# Patient Record
Sex: FEMALE | Race: BLACK OR AFRICAN AMERICAN | Employment: FULL TIME | URBAN - METROPOLITAN AREA
[De-identification: names, ages, dates, MRNs, and addresses within clinical notes are randomized per-mention and may not be internally consistent; named-entity substitution may affect disease eponyms.]

---

## 2020-12-23 ENCOUNTER — APPOINTMENT (OUTPATIENT)
Dept: GENERAL RADIOLOGY | Age: 35
DRG: 674 | End: 2020-12-23
Attending: EMERGENCY MEDICINE
Payer: COMMERCIAL

## 2020-12-23 ENCOUNTER — HOSPITAL ENCOUNTER (INPATIENT)
Age: 35
LOS: 18 days | Discharge: HOME | DRG: 674 | End: 2021-01-11
Attending: EMERGENCY MEDICINE | Admitting: INTERNAL MEDICINE
Payer: COMMERCIAL

## 2020-12-23 DIAGNOSIS — Z20.822 SUSPECTED COVID-19 VIRUS INFECTION: ICD-10-CM

## 2020-12-23 DIAGNOSIS — N12 PYELONEPHRITIS: ICD-10-CM

## 2020-12-23 DIAGNOSIS — N17.9 ACUTE RENAL FAILURE, UNSPECIFIED ACUTE RENAL FAILURE TYPE (HCC): Primary | ICD-10-CM

## 2020-12-23 PROCEDURE — 99285 EMERGENCY DEPT VISIT HI MDM: CPT

## 2020-12-23 PROCEDURE — 96365 THER/PROPH/DIAG IV INF INIT: CPT

## 2020-12-23 PROCEDURE — 71045 X-RAY EXAM CHEST 1 VIEW: CPT

## 2020-12-23 PROCEDURE — 96374 THER/PROPH/DIAG INJ IV PUSH: CPT

## 2020-12-23 PROCEDURE — 96375 TX/PRO/DX INJ NEW DRUG ADDON: CPT

## 2020-12-23 PROCEDURE — 51702 INSERT TEMP BLADDER CATH: CPT

## 2020-12-23 NOTE — Clinical Note
Status[de-identified] INPATIENT [101]   Type of Bed: Medical [8]   Inpatient Hospitalization Certified Necessary for the Following Reasons: 3.  Patient receiving treatment that can only be provided in an inpatient setting (further clarification in H&P documentation)   Admitting Diagnosis: Acute renal failure (ARF) [facility]) [[id number]]   Admitting Physician: Kinga Landeros [[id number]]   Attending Physician: Kinga Landeros [[id number]]   Estimated Length of Stay: 2 Midnights   Discharge Plan[de-identified] Home with Office Follow-up

## 2020-12-24 ENCOUNTER — APPOINTMENT (OUTPATIENT)
Dept: INTERVENTIONAL RADIOLOGY/VASCULAR | Age: 35
DRG: 674 | End: 2020-12-24
Attending: INTERNAL MEDICINE
Payer: COMMERCIAL

## 2020-12-24 ENCOUNTER — APPOINTMENT (OUTPATIENT)
Dept: ULTRASOUND IMAGING | Age: 35
DRG: 674 | End: 2020-12-24
Attending: INTERNAL MEDICINE
Payer: COMMERCIAL

## 2020-12-24 PROBLEM — Z86.79 HX OF ESSENTIAL HYPERTENSION: Status: ACTIVE | Noted: 2020-12-24

## 2020-12-24 PROBLEM — R79.89 ELEVATED PROCALCITONIN: Status: ACTIVE | Noted: 2020-12-24

## 2020-12-24 PROBLEM — D50.9 MICROCYTIC ANEMIA: Status: ACTIVE | Noted: 2020-12-24

## 2020-12-24 PROBLEM — N17.9 ACUTE RENAL FAILURE (ARF) (HCC): Status: ACTIVE | Noted: 2020-12-24

## 2020-12-24 PROBLEM — E66.9 OBESITY: Status: ACTIVE | Noted: 2020-12-24

## 2020-12-24 LAB
ALBUMIN SERPL-MCNC: 1.8 G/DL (ref 3.5–5)
ALBUMIN/GLOB SERPL: 0.4 {RATIO} (ref 1.2–3.5)
ALP SERPL-CCNC: 107 U/L (ref 50–136)
ALT SERPL-CCNC: 35 U/L (ref 12–65)
ANION GAP SERPL CALC-SCNC: 18 MMOL/L (ref 7–16)
APPEARANCE UR: ABNORMAL
AST SERPL-CCNC: 77 U/L (ref 15–37)
BACTERIA URNS QL MICRO: ABNORMAL /HPF
BASOPHILS # BLD: 0 K/UL (ref 0–0.2)
BASOPHILS NFR BLD: 0 % (ref 0–2)
BILIRUB SERPL-MCNC: 0.3 MG/DL (ref 0.2–1.1)
BILIRUB UR QL: ABNORMAL
BNP SERPL-MCNC: ABNORMAL PG/ML (ref 5–125)
BUN SERPL-MCNC: 132 MG/DL (ref 6–23)
CALCIUM SERPL-MCNC: 8.1 MG/DL (ref 8.3–10.4)
CHLORIDE SERPL-SCNC: 94 MMOL/L (ref 98–107)
CK MB CFR SERPL CALC: 0.8 %
CK MB SERPL-MCNC: 4.3 NG/ML (ref 0.5–3.6)
CK SERPL-CCNC: 512 U/L (ref 21–215)
CO2 SERPL-SCNC: 18 MMOL/L (ref 21–32)
COLOR UR: ABNORMAL
COVID-19 RAPID TEST, COVR: NOT DETECTED
CREAT SERPL-MCNC: 15.7 MG/DL (ref 0.6–1)
DIFFERENTIAL METHOD BLD: ABNORMAL
EOSINOPHIL # BLD: 0 K/UL (ref 0–0.8)
EOSINOPHIL NFR BLD: 0 % (ref 0.5–7.8)
EPI CELLS #/AREA URNS HPF: ABNORMAL /HPF
ERYTHROCYTE [DISTWIDTH] IN BLOOD BY AUTOMATED COUNT: 14 % (ref 11.9–14.6)
ERYTHROCYTE [SEDIMENTATION RATE] IN BLOOD: 116 MM/HR (ref 0–20)
FERRITIN SERPL-MCNC: 750 NG/ML (ref 8–388)
GLOBULIN SER CALC-MCNC: 5 G/DL (ref 2.3–3.5)
GLUCOSE SERPL-MCNC: 189 MG/DL (ref 65–100)
GLUCOSE UR STRIP.AUTO-MCNC: 100 MG/DL
HAV IGM SER QL: NONREACTIVE
HBV CORE IGM SER QL: NONREACTIVE
HBV SURFACE AG SER QL: NONREACTIVE
HCG SERPL QL: NEGATIVE
HCT VFR BLD AUTO: 32.1 % (ref 35.8–46.3)
HCV AB SER QL: NONREACTIVE
HGB BLD-MCNC: 10.7 G/DL (ref 11.7–15.4)
HGB UR QL STRIP: ABNORMAL
HIV 1+2 AB+HIV1 P24 AG SERPL QL IA: NONREACTIVE
HIV12 RESULT COMMENT, HHIVC: ABNORMAL
IMM GRANULOCYTES # BLD AUTO: 0.2 K/UL (ref 0–0.5)
IMM GRANULOCYTES NFR BLD AUTO: 2 % (ref 0–5)
IRON SATN MFR SERPL: 35 %
IRON SERPL-MCNC: 41 UG/DL (ref 35–150)
KETONES UR QL STRIP.AUTO: NEGATIVE MG/DL
LACTATE SERPL-SCNC: 1.1 MMOL/L (ref 0.4–2)
LEUKOCYTE ESTERASE UR QL STRIP.AUTO: NEGATIVE
LYMPHOCYTES # BLD: 1.3 K/UL (ref 0.5–4.6)
LYMPHOCYTES NFR BLD: 13 % (ref 13–44)
MAGNESIUM SERPL-MCNC: 2.2 MG/DL (ref 1.8–2.4)
MCH RBC QN AUTO: 25.4 PG (ref 26.1–32.9)
MCHC RBC AUTO-ENTMCNC: 33.3 G/DL (ref 31.4–35)
MCV RBC AUTO: 76.1 FL (ref 79.6–97.8)
MONOCYTES # BLD: 0.9 K/UL (ref 0.1–1.3)
MONOCYTES NFR BLD: 10 % (ref 4–12)
NEUTS SEG # BLD: 7.1 K/UL (ref 1.7–8.2)
NEUTS SEG NFR BLD: 75 % (ref 43–78)
NITRITE UR QL STRIP.AUTO: NEGATIVE
NRBC # BLD: 0 K/UL (ref 0–0.2)
PH UR STRIP: 5.5 [PH] (ref 5–9)
PHOSPHATE SERPL-MCNC: 8.2 MG/DL (ref 2.5–4.5)
PLATELET # BLD AUTO: 291 K/UL (ref 150–450)
PMV BLD AUTO: 10.6 FL (ref 9.4–12.3)
POTASSIUM SERPL-SCNC: 4.1 MMOL/L (ref 3.5–5.1)
PROCALCITONIN SERPL-MCNC: 5.88 NG/ML
PROT SERPL-MCNC: 6.8 G/DL (ref 6.3–8.2)
PROT UR STRIP-MCNC: >300 MG/DL
RBC # BLD AUTO: 4.22 M/UL (ref 4.05–5.2)
RBC #/AREA URNS HPF: >100 /HPF
SARS COV-2, XPGCVT: NEGATIVE
SODIUM SERPL-SCNC: 130 MMOL/L (ref 136–145)
SOURCE, COVRS: NORMAL
SP GR UR REFRACTOMETRY: 1.02 (ref 1–1.02)
TIBC SERPL-MCNC: 116 UG/DL (ref 250–450)
TSH SERPL DL<=0.005 MIU/L-ACNC: 0.84 UIU/ML (ref 0.36–3.74)
UROBILINOGEN UR QL STRIP.AUTO: 0.2 EU/DL (ref 0.2–1)
WBC # BLD AUTO: 9.5 K/UL (ref 4.3–11.1)
WBC URNS QL MICRO: ABNORMAL /HPF

## 2020-12-24 PROCEDURE — 85652 RBC SED RATE AUTOMATED: CPT

## 2020-12-24 PROCEDURE — 83520 IMMUNOASSAY QUANT NOS NONAB: CPT

## 2020-12-24 PROCEDURE — 83735 ASSAY OF MAGNESIUM: CPT

## 2020-12-24 PROCEDURE — 84145 PROCALCITONIN (PCT): CPT

## 2020-12-24 PROCEDURE — 77001 FLUOROGUIDE FOR VEIN DEVICE: CPT

## 2020-12-24 PROCEDURE — 77030002916 HC SUT ETHLN J&J -A

## 2020-12-24 PROCEDURE — 74011250636 HC RX REV CODE- 250/636: Performed by: STUDENT IN AN ORGANIZED HEALTH CARE EDUCATION/TRAINING PROGRAM

## 2020-12-24 PROCEDURE — 84100 ASSAY OF PHOSPHORUS: CPT

## 2020-12-24 PROCEDURE — C1894 INTRO/SHEATH, NON-LASER: HCPCS

## 2020-12-24 PROCEDURE — 87635 SARS-COV-2 COVID-19 AMP PRB: CPT

## 2020-12-24 PROCEDURE — 5A1D70Z PERFORMANCE OF URINARY FILTRATION, INTERMITTENT, LESS THAN 6 HOURS PER DAY: ICD-10-PCS | Performed by: NURSE PRACTITIONER

## 2020-12-24 PROCEDURE — 83880 ASSAY OF NATRIURETIC PEPTIDE: CPT

## 2020-12-24 PROCEDURE — 80053 COMPREHEN METABOLIC PANEL: CPT

## 2020-12-24 PROCEDURE — 77030018719 HC DRSG PTCH ANTIMIC J&J -A

## 2020-12-24 PROCEDURE — 84703 CHORIONIC GONADOTROPIN ASSAY: CPT

## 2020-12-24 PROCEDURE — 86160 COMPLEMENT ANTIGEN: CPT

## 2020-12-24 PROCEDURE — 85025 COMPLETE CBC W/AUTO DIFF WBC: CPT

## 2020-12-24 PROCEDURE — 86225 DNA ANTIBODY NATIVE: CPT

## 2020-12-24 PROCEDURE — 02H633Z INSERTION OF INFUSION DEVICE INTO RIGHT ATRIUM, PERCUTANEOUS APPROACH: ICD-10-PCS | Performed by: RADIOLOGY

## 2020-12-24 PROCEDURE — 80074 ACUTE HEPATITIS PANEL: CPT

## 2020-12-24 PROCEDURE — 90935 HEMODIALYSIS ONE EVALUATION: CPT

## 2020-12-24 PROCEDURE — 83605 ASSAY OF LACTIC ACID: CPT

## 2020-12-24 PROCEDURE — 82784 ASSAY IGA/IGD/IGG/IGM EACH: CPT

## 2020-12-24 PROCEDURE — 74011250637 HC RX REV CODE- 250/637: Performed by: STUDENT IN AN ORGANIZED HEALTH CARE EDUCATION/TRAINING PROGRAM

## 2020-12-24 PROCEDURE — 74011250636 HC RX REV CODE- 250/636: Performed by: EMERGENCY MEDICINE

## 2020-12-24 PROCEDURE — 82553 CREATINE MB FRACTION: CPT

## 2020-12-24 PROCEDURE — 77010033678 HC OXYGEN DAILY

## 2020-12-24 PROCEDURE — 74011000258 HC RX REV CODE- 258: Performed by: EMERGENCY MEDICINE

## 2020-12-24 PROCEDURE — 81003 URINALYSIS AUTO W/O SCOPE: CPT

## 2020-12-24 PROCEDURE — C1752 CATH,HEMODIALYSIS,SHORT-TERM: HCPCS

## 2020-12-24 PROCEDURE — 82728 ASSAY OF FERRITIN: CPT

## 2020-12-24 PROCEDURE — 84443 ASSAY THYROID STIM HORMONE: CPT

## 2020-12-24 PROCEDURE — 94760 N-INVAS EAR/PLS OXIMETRY 1: CPT

## 2020-12-24 PROCEDURE — 74011000250 HC RX REV CODE- 250: Performed by: RADIOLOGY

## 2020-12-24 PROCEDURE — 84166 PROTEIN E-PHORESIS/URINE/CSF: CPT

## 2020-12-24 PROCEDURE — 2709999900 HC NON-CHARGEABLE SUPPLY

## 2020-12-24 PROCEDURE — 76770 US EXAM ABDO BACK WALL COMP: CPT

## 2020-12-24 PROCEDURE — 74011250636 HC RX REV CODE- 250/636: Performed by: RADIOLOGY

## 2020-12-24 PROCEDURE — 87389 HIV-1 AG W/HIV-1&-2 AB AG IA: CPT

## 2020-12-24 PROCEDURE — 74011250637 HC RX REV CODE- 250/637: Performed by: INTERNAL MEDICINE

## 2020-12-24 PROCEDURE — 83550 IRON BINDING TEST: CPT

## 2020-12-24 PROCEDURE — 65660000000 HC RM CCU STEPDOWN

## 2020-12-24 PROCEDURE — 74011000250 HC RX REV CODE- 250: Performed by: INTERNAL MEDICINE

## 2020-12-24 PROCEDURE — 74011250636 HC RX REV CODE- 250/636: Performed by: INTERNAL MEDICINE

## 2020-12-24 PROCEDURE — 87086 URINE CULTURE/COLONY COUNT: CPT

## 2020-12-24 PROCEDURE — 86334 IMMUNOFIX E-PHORESIS SERUM: CPT

## 2020-12-24 PROCEDURE — 74011000250 HC RX REV CODE- 250: Performed by: STUDENT IN AN ORGANIZED HEALTH CARE EDUCATION/TRAINING PROGRAM

## 2020-12-24 RX ORDER — SODIUM CHLORIDE 0.9 % (FLUSH) 0.9 %
5-40 SYRINGE (ML) INJECTION EVERY 8 HOURS
Status: DISCONTINUED | OUTPATIENT
Start: 2020-12-24 | End: 2021-01-11 | Stop reason: HOSPADM

## 2020-12-24 RX ORDER — ONDANSETRON 2 MG/ML
4 INJECTION INTRAMUSCULAR; INTRAVENOUS
Status: DISCONTINUED | OUTPATIENT
Start: 2020-12-24 | End: 2020-12-27

## 2020-12-24 RX ORDER — LIDOCAINE HYDROCHLORIDE 20 MG/ML
2-10 INJECTION, SOLUTION INFILTRATION; PERINEURAL ONCE
Status: COMPLETED | OUTPATIENT
Start: 2020-12-24 | End: 2020-12-24

## 2020-12-24 RX ORDER — PROMETHAZINE HYDROCHLORIDE 25 MG/1
12.5 TABLET ORAL
Status: DISCONTINUED | OUTPATIENT
Start: 2020-12-24 | End: 2021-01-11 | Stop reason: HOSPADM

## 2020-12-24 RX ORDER — POLYETHYLENE GLYCOL 3350 17 G/17G
17 POWDER, FOR SOLUTION ORAL DAILY PRN
Status: DISCONTINUED | OUTPATIENT
Start: 2020-12-24 | End: 2021-01-11 | Stop reason: HOSPADM

## 2020-12-24 RX ORDER — ACETAMINOPHEN 650 MG/1
650 SUPPOSITORY RECTAL
Status: DISCONTINUED | OUTPATIENT
Start: 2020-12-24 | End: 2021-01-11 | Stop reason: HOSPADM

## 2020-12-24 RX ORDER — METOPROLOL SUCCINATE 100 MG/1
100 TABLET, EXTENDED RELEASE ORAL DAILY
Status: DISCONTINUED | OUTPATIENT
Start: 2020-12-24 | End: 2020-12-26

## 2020-12-24 RX ORDER — HEPARIN SODIUM 1000 [USP'U]/ML
5000 INJECTION, SOLUTION INTRAVENOUS; SUBCUTANEOUS ONCE
Status: COMPLETED | OUTPATIENT
Start: 2020-12-24 | End: 2020-12-24

## 2020-12-24 RX ORDER — METOPROLOL SUCCINATE 50 MG/1
25 TABLET, EXTENDED RELEASE ORAL DAILY
Status: DISCONTINUED | OUTPATIENT
Start: 2020-12-24 | End: 2020-12-24

## 2020-12-24 RX ORDER — SODIUM CHLORIDE 0.9 % (FLUSH) 0.9 %
5-40 SYRINGE (ML) INJECTION AS NEEDED
Status: DISCONTINUED | OUTPATIENT
Start: 2020-12-24 | End: 2021-01-11 | Stop reason: HOSPADM

## 2020-12-24 RX ORDER — HYDRALAZINE HYDROCHLORIDE 50 MG/1
25 TABLET, FILM COATED ORAL
Status: DISCONTINUED | OUTPATIENT
Start: 2020-12-24 | End: 2021-01-04

## 2020-12-24 RX ORDER — ACETAMINOPHEN 325 MG/1
650 TABLET ORAL
Status: DISCONTINUED | OUTPATIENT
Start: 2020-12-24 | End: 2021-01-11 | Stop reason: HOSPADM

## 2020-12-24 RX ORDER — HYDRALAZINE HYDROCHLORIDE 25 MG/1
25 TABLET, FILM COATED ORAL 3 TIMES DAILY
Status: DISCONTINUED | OUTPATIENT
Start: 2020-12-24 | End: 2020-12-24

## 2020-12-24 RX ADMIN — HYDRALAZINE HYDROCHLORIDE 25 MG: 50 TABLET, FILM COATED ORAL at 06:40

## 2020-12-24 RX ADMIN — NICARDIPINE HYDROCHLORIDE 15 MG/HR: 2.5 INJECTION, SOLUTION INTRAVENOUS at 09:45

## 2020-12-24 RX ADMIN — LIDOCAINE HYDROCHLORIDE 200 MG: 20 INJECTION, SOLUTION INFILTRATION; PERINEURAL at 10:59

## 2020-12-24 RX ADMIN — SODIUM CHLORIDE 1000 ML: 900 INJECTION, SOLUTION INTRAVENOUS at 00:34

## 2020-12-24 RX ADMIN — CEFTRIAXONE SODIUM 1 G: 1 INJECTION, POWDER, FOR SOLUTION INTRAMUSCULAR; INTRAVENOUS at 03:51

## 2020-12-24 RX ADMIN — NICARDIPINE HYDROCHLORIDE 15 MG/HR: 2.5 INJECTION, SOLUTION INTRAVENOUS at 16:08

## 2020-12-24 RX ADMIN — METOPROLOL SUCCINATE 100 MG: 100 TABLET, EXTENDED RELEASE ORAL at 16:47

## 2020-12-24 RX ADMIN — NICARDIPINE HYDROCHLORIDE 15 MG/HR: 2.5 INJECTION, SOLUTION INTRAVENOUS at 13:18

## 2020-12-24 RX ADMIN — Medication 10 ML: at 22:00

## 2020-12-24 RX ADMIN — SODIUM BICARBONATE: 84 INJECTION, SOLUTION INTRAVENOUS at 19:47

## 2020-12-24 RX ADMIN — NICARDIPINE HYDROCHLORIDE 10 MG/HR: 2.5 INJECTION, SOLUTION INTRAVENOUS at 07:36

## 2020-12-24 RX ADMIN — Medication 30 ML: at 14:00

## 2020-12-24 RX ADMIN — HEPARIN SODIUM 3200 UNITS: 1000 INJECTION INTRAVENOUS; SUBCUTANEOUS at 10:59

## 2020-12-24 RX ADMIN — METOPROLOL SUCCINATE 25 MG: 50 TABLET, EXTENDED RELEASE ORAL at 05:44

## 2020-12-24 RX ADMIN — NICARDIPINE HYDROCHLORIDE 10 MG/HR: 2.5 INJECTION, SOLUTION INTRAVENOUS at 17:55

## 2020-12-24 RX ADMIN — SODIUM BICARBONATE: 84 INJECTION, SOLUTION INTRAVENOUS at 05:17

## 2020-12-24 NOTE — DIALYSIS
Hemodialysis treatment completed. Clotted with 15 min left     Patient alert and VS stable  /76  P 106       0.9 Kgs removed. Flushed both ports with 10 mL of NS.  CVC dressing clean, dry, and intact, tego caps intact,    Patient remains in 528.

## 2020-12-24 NOTE — ED PROVIDER NOTES
Presents to the ER with complaints of generalized weakness, fatigue and abdominal pain. States symptoms started over 2 weeks ago. States initially started with fever, malaise and body aches. Reports is since progressed to include nausea and vomiting. Apparently was seen at urgent care today. Had labs drawn that showed a elevated creatinine at 15. She did have a CT scan performed that it showed possible bilateral lung infiltrates as well as possible pyelonephritis. They did want to direct admit patient but was unable to do secondary to bed status so she was directed to the ER. The history is provided by the patient. Abdominal Pain   This is a recurrent problem. The current episode started more than 2 days ago. The problem has not changed since onset. The pain is located in the generalized abdominal region. The quality of the pain is aching and cramping. The pain is at a severity of 5/10. The pain is moderate. Associated symptoms include nausea, vomiting and back pain. Pertinent negatives include no hematochezia, no melena, no frequency and no hematuria. Past workup includes CT scan. Her past medical history is significant for UTI. No past medical history on file. No past surgical history on file. No family history on file.     Social History     Socioeconomic History    Marital status: SINGLE     Spouse name: Not on file    Number of children: Not on file    Years of education: Not on file    Highest education level: Not on file   Occupational History    Not on file   Social Needs    Financial resource strain: Not on file    Food insecurity     Worry: Not on file     Inability: Not on file    Transportation needs     Medical: Not on file     Non-medical: Not on file   Tobacco Use    Smoking status: Not on file   Substance and Sexual Activity    Alcohol use: Not on file    Drug use: Not on file    Sexual activity: Not on file   Lifestyle    Physical activity     Days per week: Not on file     Minutes per session: Not on file    Stress: Not on file   Relationships    Social connections     Talks on phone: Not on file     Gets together: Not on file     Attends Gnosticist service: Not on file     Active member of club or organization: Not on file     Attends meetings of clubs or organizations: Not on file     Relationship status: Not on file    Intimate partner violence     Fear of current or ex partner: Not on file     Emotionally abused: Not on file     Physically abused: Not on file     Forced sexual activity: Not on file   Other Topics Concern    Not on file   Social History Narrative    Not on file         ALLERGIES: Patient has no known allergies. Review of Systems   Constitutional: Positive for chills and fatigue. HENT: Negative for congestion and dental problem. Respiratory: Negative for chest tightness, shortness of breath and stridor. Cardiovascular: Negative for palpitations and leg swelling. Gastrointestinal: Positive for abdominal pain, nausea and vomiting. Negative for hematochezia and melena. Genitourinary: Negative for flank pain, frequency, hematuria and urgency. Musculoskeletal: Positive for back pain. Negative for joint swelling. Neurological: Negative for light-headedness and numbness. Hematological: Negative for adenopathy. Does not bruise/bleed easily. Psychiatric/Behavioral: Negative for behavioral problems and decreased concentration. Vitals:    12/23/20 2033   BP: (!) 173/97   Pulse: (!) 119   Resp: 18   Temp: 98 °F (36.7 °C)   SpO2: 98%   Weight: 124.7 kg (275 lb)   Height: 5' 5\" (1.651 m)            Physical Exam  Vitals signs and nursing note reviewed. Constitutional:       Appearance: She is well-developed. HENT:      Head: Normocephalic and atraumatic. Nose: Nose normal.      Mouth/Throat:      Mouth: Mucous membranes are moist.      Pharynx: No oropharyngeal exudate or posterior oropharyngeal erythema.    Eyes: Extraocular Movements: Extraocular movements intact. Conjunctiva/sclera: Conjunctivae normal.      Pupils: Pupils are equal, round, and reactive to light. Neck:      Musculoskeletal: Normal range of motion and neck supple. No neck rigidity or muscular tenderness. Cardiovascular:      Rate and Rhythm: Regular rhythm. Tachycardia present. Pulses: Normal pulses. Heart sounds: Normal heart sounds. No murmur. Pulmonary:      Effort: Pulmonary effort is normal.      Breath sounds: Normal breath sounds. No stridor. No wheezing. Abdominal:      General: Abdomen is flat and scaphoid. There is no distension. Palpations: There is no mass. Musculoskeletal: Normal range of motion. General: No swelling, tenderness, deformity or signs of injury. Skin:     General: Skin is warm and dry. Coloration: Skin is not jaundiced or pale. Neurological:      General: No focal deficit present. Mental Status: She is alert and oriented to person, place, and time. Cranial Nerves: No cranial nerve deficit. Sensory: No sensory deficit. Motor: No weakness. Psychiatric:         Mood and Affect: Mood normal.          MDM  Number of Diagnoses or Management Options  Acute renal failure, unspecified acute renal failure type ([address]  Pyelonephritis  Suspected COVID-19 virus infection  Diagnosis management comments: We will try to review records from outside facility. Initiate IV fluids. 4:06 AM  Creatinine is elevated here 15. Analysis shows 1+ bacteria. Chest x-ray shows no acute abnormalities. Patient will be admitted to hospitalist service. Likely needs Covid rule out. Graff catheter has been placed for strict I's and O's.        Amount and/or Complexity of Data Reviewed  Clinical lab tests: ordered and reviewed  Tests in the radiology section of CPT®: ordered and reviewed  Review and summarize past medical records: yes  Discuss the patient with other providers: yes  Independent visualization of images, tracings, or specimens: yes    Risk of Complications, Morbidity, and/or Mortality  Presenting problems: high  Diagnostic procedures: moderate  Management options: high           Procedures                 Results Include:    Recent Results (from the past 24 hour(s))   CBC WITH AUTOMATED DIFF    Collection Time: 12/24/20 12:16 AM   Result Value Ref Range    WBC 9.5 4.3 - 11.1 K/uL    RBC 4.22 4.05 - 5.2 M/uL    HGB 10.7 (L) 11.7 - 15.4 g/dL    HCT 32.1 (L) 35.8 - 46.3 %    MCV 76.1 (L) 79.6 - 97.8 FL    MCH 25.4 (L) 26.1 - 32.9 PG    MCHC 33.3 31.4 - 35.0 g/dL    RDW 14.0 11.9 - 14.6 %    PLATELET 748 612 - 451 K/uL    MPV 10.6 9.4 - 12.3 FL    ABSOLUTE NRBC 0.00 0.0 - 0.2 K/uL    DF AUTOMATED      NEUTROPHILS 75 43 - 78 %    LYMPHOCYTES 13 13 - 44 %    MONOCYTES 10 4.0 - 12.0 %    EOSINOPHILS 0 (L) 0.5 - 7.8 %    BASOPHILS 0 0.0 - 2.0 %    IMMATURE GRANULOCYTES 2 0.0 - 5.0 %    ABS. NEUTROPHILS 7.1 1.7 - 8.2 K/UL    ABS. LYMPHOCYTES 1.3 0.5 - 4.6 K/UL    ABS. MONOCYTES 0.9 0.1 - 1.3 K/UL    ABS. EOSINOPHILS 0.0 0.0 - 0.8 K/UL    ABS. BASOPHILS 0.0 0.0 - 0.2 K/UL    ABS. IMM. GRANS. 0.2 0.0 - 0.5 K/UL   METABOLIC PANEL, COMPREHENSIVE    Collection Time: 12/24/20 12:16 AM   Result Value Ref Range    Sodium 130 (L) 136 - 145 mmol/L    Potassium 4.1 3.5 - 5.1 mmol/L    Chloride 94 (L) 98 - 107 mmol/L    CO2 18 (L) 21 - 32 mmol/L    Anion gap 18 (H) 7 - 16 mmol/L    Glucose 189 (H) 65 - 100 mg/dL     (H) 6 - 23 MG/DL    Creatinine 15.70 (H) 0.6 - 1.0 MG/DL    GFR est AA 3 (L) >60 ml/min/1.73m2    GFR est non-AA 3 (L) >60 ml/min/1.73m2    Calcium 8.1 (L) 8.3 - 10.4 MG/DL    Bilirubin, total 0.3 0.2 - 1.1 MG/DL    ALT (SGPT) 35 12 - 65 U/L    AST (SGOT) 77 (H) 15 - 37 U/L    Alk.  phosphatase 107 50 - 136 U/L    Protein, total 6.8 6.3 - 8.2 g/dL    Albumin 1.8 (L) 3.5 - 5.0 g/dL    Globulin 5.0 (H) 2.3 - 3.5 g/dL    A-G Ratio 0.4 (L) 1.2 - 3.5     MAGNESIUM    Collection Time: 12/24/20 12:16 AM   Result Value Ref Range    Magnesium 2.2 1.8 - 2.4 mg/dL   URINALYSIS W/ RFLX MICROSCOPIC    Collection Time: 12/24/20 12:16 AM   Result Value Ref Range    Color ORANGE      Appearance HAZY      Specific gravity 1.020 1.001 - 1.023      pH (UA) 5.5 5.0 - 9.0      Protein >300 (A) NEG mg/dL    Glucose 100 (A) NEG mg/dL    Ketone Negative NEG mg/dL    Bilirubin SMALL (A) NEG      Blood LARGE (A) NEG      Urobilinogen 0.2 0.2 - 1.0 EU/dL    Nitrites Negative NEG      Leukocyte Esterase Negative NEG      WBC 5-10 0 /hpf    RBC >100 0 /hpf    Epithelial cells 0-3 0 /hpf    Bacteria 1+ (H) 0 /hpf   LACTIC ACID    Collection Time: 12/24/20 12:16 AM   Result Value Ref Range    Lactic acid 1.1 0.4 - 2.0 MMOL/L   PROCALCITONIN    Collection Time: 12/24/20 12:16 AM   Result Value Ref Range    Procalcitonin 5.88 ng/mL   NT-PRO BNP    Collection Time: 12/24/20 12:16 AM   Result Value Ref Range    NT pro-BNP 16,587 (H) 5 - 125 PG/ML   CK WITH MB    Collection Time: 12/24/20 12:20 AM   Result Value Ref Range    CK - MB 4.3 (H) 0.5 - 3.6 ng/ml    CK-MB Index 0.8 <2.5 %     (H) 21 - 215 U/L   SED RATE, AUTOMATED    Collection Time: 12/24/20 12:20 AM   Result Value Ref Range    Sed rate, automated 116 (H) 0 - 20 mm/hr   SARS-COV-2    Collection Time: 12/24/20 12:56 AM   Result Value Ref Range    SARS-CoV-2 PENDING     Specimen source Nasopharyngeal      COVID-19 rapid test Not detected NOTD      SARS CoV-2 PENDING      Voice dictation software was used during the making of this note. This software is not perfect and grammatical and other typographical errors may be present. This note has been proofread, but may still contain errors.   Chastity Landis MD; 12/24/2020 @4:07 AM   ===================================================================

## 2020-12-24 NOTE — H&P
HOSPITALIST H&P/CONSULT  NAME:  Morris Last   Age:  37 y.o.  :   1985   MRN:   [id number]  PCP: Carmen Jones MD  Consulting MD:  Treatment Team: Attending Provider: Kevin Hernandez; Primary Nurse: Jes Turcios  HPI:   Patient 35F with pmhx of HTN currently not on antihypertensive medications presents from Urgent care where she was seen for report of abdominal pain. Patient reports pain began about a week ago, diffuse and cramping. Not made worse by food intake. Has had occasional loose non bloody stool. Reports fever for one day last week that has since resolved. Works at a [employer] where there were several with covid-19 and tested negative for covid-19 three days ago. She went to UC today for continued abdominal pain where work-up reveals JOSSELIN (Cr of 15). CT AP reporting possible bilateral pyelonephritis and bibasilar infiltrates ?covid pna. Pt reports very infrequent cough without dyspnea. Pt was sent to ER for admission    ER workup at [facility] notable for Na 130, K 4.1, Cl 94, co2 18, , Cr 15, Albumin 1.8, ALT 35, AST 77, Procal 5.8, , BNP >16K. UA >300 protein, Gluocse 100, Large blood, >100 RBC, 1+ bacteria    Complete ROS done and is as stated in HPI or otherwise negative  PMHx HTN  Psx none  None     Social hx - no alcohol or tobacco.    No Known Allergies   No family history on file. Objective:     Visit Vitals  BP (!) 182/95   Pulse (!) 114   Temp 98 °F (36.7 °C)   Resp 16   Ht 5' 5\" (1.651 m)   Wt 124.7 kg (275 lb)   SpO2 97%   BMI 45.76 kg/m²      Temp (24hrs), Av °F (36.7 °C), Min:98 °F (36.7 °C), Max:98 °F (36.7 °C)    Oxygen Therapy  O2 Sat (%): 97 % (20 0230)  Pulse via Oximetry: 114 beats per minute (20 0230)  O2 Device: Room air (20 0110)  Physical Exam:  General:    Alert, cooperative, no distress, appears stated age. obese    Head:   Normocephalic, without obvious abnormality, atraumatic.   Nose:  Nares normal. No drainage or sinus tenderness. Lungs:   Clear to auscultation bilaterally. No Wheezing or Rhonchi. No rales. Heart:   Regular rate and rhythm,  no murmur, rub or gallop. Abdomen:   Diffusely TTP bilateral upper quadrants to deep palpation. BS pos x 4  Extremities: No cyanosis. No edema. No clubbing  Skin:     Texture, turgor normal. No rashes or lesions. Not Jaundiced  Neurologic: Alert and oriented x 3, no focal deficits   Data Review:   Recent Results (from the past 24 hour(s))   CBC WITH AUTOMATED DIFF    Collection Time: 12/24/20 12:16 AM   Result Value Ref Range    WBC 9.5 4.3 - 11.1 K/uL    RBC 4.22 4.05 - 5.2 M/uL    HGB 10.7 (L) 11.7 - 15.4 g/dL    HCT 32.1 (L) 35.8 - 46.3 %    MCV 76.1 (L) 79.6 - 97.8 FL    MCH 25.4 (L) 26.1 - 32.9 PG    MCHC 33.3 31.4 - 35.0 g/dL    RDW 14.0 11.9 - 14.6 %    PLATELET 825 155 - 457 K/uL    MPV 10.6 9.4 - 12.3 FL    ABSOLUTE NRBC 0.00 0.0 - 0.2 K/uL    DF AUTOMATED      NEUTROPHILS 75 43 - 78 %    LYMPHOCYTES 13 13 - 44 %    MONOCYTES 10 4.0 - 12.0 %    EOSINOPHILS 0 (L) 0.5 - 7.8 %    BASOPHILS 0 0.0 - 2.0 %    IMMATURE GRANULOCYTES 2 0.0 - 5.0 %    ABS. NEUTROPHILS 7.1 1.7 - 8.2 K/UL    ABS. LYMPHOCYTES 1.3 0.5 - 4.6 K/UL    ABS. MONOCYTES 0.9 0.1 - 1.3 K/UL    ABS. EOSINOPHILS 0.0 0.0 - 0.8 K/UL    ABS. BASOPHILS 0.0 0.0 - 0.2 K/UL    ABS. IMM. GRANS. 0.2 0.0 - 0.5 K/UL   METABOLIC PANEL, COMPREHENSIVE    Collection Time: 12/24/20 12:16 AM   Result Value Ref Range    Sodium 130 (L) 136 - 145 mmol/L    Potassium 4.1 3.5 - 5.1 mmol/L    Chloride 94 (L) 98 - 107 mmol/L    CO2 18 (L) 21 - 32 mmol/L    Anion gap 18 (H) 7 - 16 mmol/L    Glucose 189 (H) 65 - 100 mg/dL     (H) 6 - 23 MG/DL    Creatinine 15.70 (H) 0.6 - 1.0 MG/DL    GFR est AA 3 (L) >60 ml/min/1.73m2    GFR est non-AA 3 (L) >60 ml/min/1.73m2    Calcium 8.1 (L) 8.3 - 10.4 MG/DL    Bilirubin, total 0.3 0.2 - 1.1 MG/DL    ALT (SGPT) 35 12 - 65 U/L    AST (SGOT) 77 (H) 15 - 37 U/L    Alk.  phosphatase 107 50 - 136 U/L    Protein, total 6.8 6.3 - 8.2 g/dL    Albumin 1.8 (L) 3.5 - 5.0 g/dL    Globulin 5.0 (H) 2.3 - 3.5 g/dL    A-G Ratio 0.4 (L) 1.2 - 3.5     MAGNESIUM    Collection Time: 12/24/20 12:16 AM   Result Value Ref Range    Magnesium 2.2 1.8 - 2.4 mg/dL   URINALYSIS W/ RFLX MICROSCOPIC    Collection Time: 12/24/20 12:16 AM   Result Value Ref Range    Color ORANGE      Appearance HAZY      Specific gravity 1.020 1.001 - 1.023      pH (UA) 5.5 5.0 - 9.0      Protein >300 (A) NEG mg/dL    Glucose 100 (A) NEG mg/dL    Ketone Negative NEG mg/dL    Bilirubin SMALL (A) NEG      Blood LARGE (A) NEG      Urobilinogen 0.2 0.2 - 1.0 EU/dL    Nitrites Negative NEG      Leukocyte Esterase Negative NEG      WBC 5-10 0 /hpf    RBC >100 0 /hpf    Epithelial cells 0-3 0 /hpf    Bacteria 1+ (H) 0 /hpf   LACTIC ACID    Collection Time: 12/24/20 12:16 AM   Result Value Ref Range    Lactic acid 1.1 0.4 - 2.0 MMOL/L   PROCALCITONIN    Collection Time: 12/24/20 12:16 AM   Result Value Ref Range    Procalcitonin 5.88 ng/mL   NT-PRO BNP    Collection Time: 12/24/20 12:16 AM   Result Value Ref Range    NT pro-BNP 16,587 (H) 5 - 125 PG/ML   CK WITH MB    Collection Time: 12/24/20 12:20 AM   Result Value Ref Range    CK - MB 4.3 (H) 0.5 - 3.6 ng/ml    CK-MB Index 0.8 <2.5 %     (H) 21 - 215 U/L   SED RATE, AUTOMATED    Collection Time: 12/24/20 12:20 AM   Result Value Ref Range    Sed rate, automated 116 (H) 0 - 20 mm/hr   SARS-COV-2    Collection Time: 12/24/20 12:56 AM   Result Value Ref Range    SARS-CoV-2 PENDING     Specimen source Nasopharyngeal      COVID-19 rapid test Not detected NOTD      SARS CoV-2 PENDING      Imaging /Procedures /Studies   Final [99] 12/23/2020 23:29 12/23/2020 11:34 PM [id number] 11:34 PM   Study Result    EXAM: XR CHEST PORT     INDICATION: dyspnea     COMPARISON: None.     FINDINGS: A portable AP radiograph of the chest was obtained at 2322 hours. The  patient is on a cardiac monitor. The lungs are clear.  The cardiac and  mediastinal contours and pulmonary vascularity are normal.  The bones and soft  tissues are grossly within normal limits.      IMPRESSION  IMPRESSION: Normal chest.       Assessment and Plan: Active Hospital Problems    Diagnosis Date Noted    Acute renal failure (ARF) ([address] 12/24/2020    Elevated procalcitonin 12/24/2020    Obesity 12/24/2020    Hx of essential hypertension 12/24/2020       A/P  # Acute renal failure - with significant proteinuria w/o edema on exam.   - bicarb ivf  - Strict I/O  - nephrology consult  - Serologies - DEVEN, complement levels, UPEP, SPEP, Hepatitis, ANCA  - urine culture/blood culture  - empiric Rocephin - elevated procalcitonin.    - Renal ultrasound    # Bibasilar infiltrates on CT from urgent care  - r/o Covid-19 (rapid negative)  - may be pulm edema from poor oncotic pressure    #  HTN  - add toprol  - prn hydralazine  - not on OP meds    # microcytic anemia  - nutritional studies     Code Status: full code    DVT prophy: SCD's    Anticipated discharge: 3-4 days    Signed By: Tanisha Munoz DO     December 24, 2020

## 2020-12-24 NOTE — ED TRIAGE NOTES
Arrives with face mask in place. Arrives via [place] [facility] from emergency md with reports possible ARF, possible COVID 19 pneumonia. Presented to emergency md with reports LLQ abdominal pain, decreased urine output, n/v/d. Onset of symptoms last Thursday. COVID negative Monday. Denies cough, fever/chills. Arrives with lab work, EKG, and CT.

## 2020-12-24 NOTE — CONSULTS
Nephrology consult    Admission Date:  12/23/2020    Admission Diagnosis  Acute renal failure (ARF) ([facility] [N17.9]    We are asked by Dr. Melida Babinski    History of Present Illness: Ms. Isadora Castillo is a 37 y.o with PMH significant for HTN admitted with complaints of abdominal pain, cramping for about a week. Normal CXR, UA + protein, large Blood, , rapid covid test negative. Reportedly CT at urgent care possible bilateral pyelonephritis and bibasilar infiltrates. We are consulted JOSSELIN. From a renal standpoint her Cr on admission was 15.70, , SNa 130, SBP in the 200s. Cr back in 10/2019 was 0.9. Patient seen and examined in ER she reports having abdominal pain and decrease in uop last few days intermittent vomiting, diarrhea, headache. Reports has had HTN for years and has taken antihypertensives when prescribed twice. Denies SOB, CP, fever/chills or edema. Graff in place minimal UOP in the last 6 hours. No past medical history on file. No past surgical history on file. Current Facility-Administered Medications   Medication Dose Route Frequency    sodium bicarbonate (8.4%) 150 mEq in dextrose 5% 1,000 mL infusion   IntraVENous CONTINUOUS    [START ON 12/25/2020] cefTRIAXone (ROCEPHIN) 1 g in 0.9% sodium chloride (MBP/ADV) 50 mL MBP  1 g IntraVENous Q24H    metoprolol succinate (TOPROL-XL) XL tablet 25 mg  25 mg Oral DAILY    hydrALAZINE (APRESOLINE) tablet 25 mg  25 mg Oral TID PRN     No current outpatient medications on file. No Known Allergies   Social History     Tobacco Use    Smoking status: Not on file   Substance Use Topics    Alcohol use: Not on file      No family history on file.      Review of Systems  Gen - no fever, no chills, appetite okay  HEENT - no sore throat, no decreased vision, no hearing loss  CV - no chest pain, no palpitation, no orthopnea  Lung - no shortness of breath, no cough, no hemoptysis  Abd - + tenderness, no nausea/vomiting, no bloody stool  Ext - no edema, no clubbing, no cyanosis  Musculoskeletal - no joint pain, no back pain  Neurologic - + headaches, no dizziness, no seizures  Psychiatric - no anxiety, no depression  Skin - no rashes, no pupura  Genitourinary - + decreased urine output, + urinary hesitancy    Objective:     Vitals:    12/24/20 0600 12/24/20 0630 12/24/20 0658 12/24/20 0701   BP: (!) 214/121 (!) 231/122  (!) 245/153   Pulse: (!) 107 (!) 104 (!) 102 (!) 103   Resp:       Temp:       SpO2: 95% 98% 97% 99%   Weight:       Height:           Intake/Output Summary (Last 24 hours) at 12/24/2020 0705  Last data filed at 12/24/2020 0446  Gross per 24 hour   Intake 1050 ml   Output --   Net 1050 ml       Physical Exam  GEN :in no distress, alert and oriented  HEENT: anicteric sclerae, Mucous membranes are moist.  Neck - supple without JVD  CV - mild tachycardia, S1, S2, no Rub   Lung - clear bilaterally, lungs expand symmetrically  Abd - soft, nontender, bowel sounds present, no hepatosplenomegaly  Ext - no clubbing, no cyanosis, no edema  Neurologic - nonfocal, no asterixis   Genitourinary - bladder nonpalpable, collins   Skin - no rashes, no purpura, no ecchymoses  Psychiatric: Normal mood and affect. Data Review:   Recent Labs     12/24/20  0016   WBC 9.5   HGB 10.7*   HCT 32.1*        Recent Labs     12/24/20  0016   *   K 4.1   CL 94*   CO2 18*   *   CREA 15.70*   *   CA 8.1*   MG 2.2     No results for input(s): PH, PCO2, PO2, PCO2 in the last 72 hours. Problem List:     Patient Active Problem List    Diagnosis Date Noted    Acute renal failure (ARF) ([facility] 12/24/2020    Elevated procalcitonin 12/24/2020    Obesity 12/24/2020    Hx of essential hypertension 12/24/2020    Microcytic anemia 12/24/2020       Impression:    Plan:   1. JOSSELIN ? Etiology reported possible pyelonephritis on CT at urgent care. Significant proteinuria and RBCs on UA  Cr 0.9 back in Oct, 2019.    Obtain renal US, Cecy, UCr, P/C ratio, ANCA, DEVEN, hepatitis, HIV, complements, phosphorus  -continue IVF for now  -consult IR for line placement and initiate for clearance and volume    2. HTN uncontrolled-  Cardene gtts per ER    3. Possible UTI- on Rocephin per hosp, cultures pending     4.  Anemia stable, check Fe studies

## 2020-12-24 NOTE — DIALYSIS
Hemodialysis treatment initiated using right IJ catheter by Sage Antoine. Pt alert and VS stable. Machine settings per MD order. Will monitor during treatment.

## 2020-12-24 NOTE — ED NOTES
Patient hypertensive, admitting MD informed, ordered to give AM BP medication early then utilize PRN hydralazine if no decrease in 30 min.

## 2020-12-24 NOTE — PROCEDURES
Department of Interventional Radiology  [phone]        Interventional Radiology Brief Procedure Note    Patient: Duane Shed MRN: [id number]  SSN: [id number]    YOB: 1985  Age: 37 y.o. Sex: female      Date of Procedure: 12/24/2020    Pre-Procedure Diagnosis: ARF    Post-Procedure Diagnosis: SAME    Procedure(s): Temporary Central Venous Catheter    Brief Description of Procedure: Successful right IJ temporary HD catheter placement. Catheter tip in proximal RA. Ready for use. Performed By: Charan Lemus MD     Assistants: None    Anesthesia:Lidocaine    Estimated Blood Loss: Less than 10ml    Specimens:  None    Implants:  Temporary Hemodialysis Catheter    Findings: As above.     Complications: None      Signed By: Charan Lemus MD     December 24, 2020

## 2020-12-25 LAB
ALBUMIN SERPL-MCNC: 1.7 G/DL (ref 3.5–5)
ALBUMIN/GLOB SERPL: 0.3 {RATIO} (ref 1.2–3.5)
ALP SERPL-CCNC: 113 U/L (ref 50–136)
ALT SERPL-CCNC: 31 U/L (ref 12–65)
ANION GAP SERPL CALC-SCNC: 11 MMOL/L (ref 7–16)
AST SERPL-CCNC: 72 U/L (ref 15–37)
BASOPHILS # BLD: 0 K/UL (ref 0–0.2)
BASOPHILS NFR BLD: 0 % (ref 0–2)
BILIRUB SERPL-MCNC: 0.3 MG/DL (ref 0.2–1.1)
BUN SERPL-MCNC: 95 MG/DL (ref 6–23)
C3 SERPL-MCNC: 125 MG/DL (ref 82–167)
C4 SERPL-MCNC: 36 MG/DL (ref 12–38)
CALCIUM SERPL-MCNC: 8.1 MG/DL (ref 8.3–10.4)
CHLORIDE SERPL-SCNC: 97 MMOL/L (ref 98–107)
CO2 SERPL-SCNC: 23 MMOL/L (ref 21–32)
CREAT SERPL-MCNC: 13.4 MG/DL (ref 0.6–1)
DIFFERENTIAL METHOD BLD: ABNORMAL
EOSINOPHIL # BLD: 0 K/UL (ref 0–0.8)
EOSINOPHIL NFR BLD: 0 % (ref 0.5–7.8)
ERYTHROCYTE [DISTWIDTH] IN BLOOD BY AUTOMATED COUNT: 13.9 % (ref 11.9–14.6)
GLOBULIN SER CALC-MCNC: 5 G/DL (ref 2.3–3.5)
GLUCOSE SERPL-MCNC: 203 MG/DL (ref 65–100)
HCT VFR BLD AUTO: 32.3 % (ref 35.8–46.3)
HGB BLD-MCNC: 10.7 G/DL (ref 11.7–15.4)
IMM GRANULOCYTES # BLD AUTO: 0.2 K/UL (ref 0–0.5)
IMM GRANULOCYTES NFR BLD AUTO: 3 % (ref 0–5)
LYMPHOCYTES # BLD: 1 K/UL (ref 0.5–4.6)
LYMPHOCYTES NFR BLD: 13 % (ref 13–44)
MCH RBC QN AUTO: 24.8 PG (ref 26.1–32.9)
MCHC RBC AUTO-ENTMCNC: 33.1 G/DL (ref 31.4–35)
MCV RBC AUTO: 74.8 FL (ref 79.6–97.8)
MONOCYTES # BLD: 0.7 K/UL (ref 0.1–1.3)
MONOCYTES NFR BLD: 10 % (ref 4–12)
NEUTS SEG # BLD: 5.3 K/UL (ref 1.7–8.2)
NEUTS SEG NFR BLD: 73 % (ref 43–78)
NRBC # BLD: 0 K/UL (ref 0–0.2)
PLATELET # BLD AUTO: 381 K/UL (ref 150–450)
PMV BLD AUTO: 10.4 FL (ref 9.4–12.3)
POTASSIUM SERPL-SCNC: 3.9 MMOL/L (ref 3.5–5.1)
PROT SERPL-MCNC: 6.7 G/DL (ref 6.3–8.2)
RBC # BLD AUTO: 4.32 M/UL (ref 4.05–5.2)
SODIUM SERPL-SCNC: 131 MMOL/L (ref 136–145)
WBC # BLD AUTO: 7.3 K/UL (ref 4.3–11.1)

## 2020-12-25 PROCEDURE — 74011250637 HC RX REV CODE- 250/637: Performed by: INTERNAL MEDICINE

## 2020-12-25 PROCEDURE — 74011000258 HC RX REV CODE- 258: Performed by: INTERNAL MEDICINE

## 2020-12-25 PROCEDURE — 74011250637 HC RX REV CODE- 250/637: Performed by: STUDENT IN AN ORGANIZED HEALTH CARE EDUCATION/TRAINING PROGRAM

## 2020-12-25 PROCEDURE — 80053 COMPREHEN METABOLIC PANEL: CPT

## 2020-12-25 PROCEDURE — 36415 COLL VENOUS BLD VENIPUNCTURE: CPT

## 2020-12-25 PROCEDURE — 2709999900 HC NON-CHARGEABLE SUPPLY

## 2020-12-25 PROCEDURE — 65660000000 HC RM CCU STEPDOWN

## 2020-12-25 PROCEDURE — 85025 COMPLETE CBC W/AUTO DIFF WBC: CPT

## 2020-12-25 PROCEDURE — 74011250636 HC RX REV CODE- 250/636: Performed by: STUDENT IN AN ORGANIZED HEALTH CARE EDUCATION/TRAINING PROGRAM

## 2020-12-25 PROCEDURE — 74011250636 HC RX REV CODE- 250/636: Performed by: INTERNAL MEDICINE

## 2020-12-25 PROCEDURE — 74011000250 HC RX REV CODE- 250: Performed by: INTERNAL MEDICINE

## 2020-12-25 RX ORDER — FAMOTIDINE 20 MG/1
20 TABLET, FILM COATED ORAL 2 TIMES DAILY
Status: DISCONTINUED | OUTPATIENT
Start: 2020-12-25 | End: 2020-12-27

## 2020-12-25 RX ORDER — HEPARIN SODIUM 5000 [USP'U]/ML
5000 INJECTION, SOLUTION INTRAVENOUS; SUBCUTANEOUS EVERY 8 HOURS
Status: DISCONTINUED | OUTPATIENT
Start: 2020-12-25 | End: 2021-01-11 | Stop reason: HOSPADM

## 2020-12-25 RX ORDER — MAG HYDROX/ALUMINUM HYD/SIMETH 200-200-20
30 SUSPENSION, ORAL (FINAL DOSE FORM) ORAL
Status: DISCONTINUED | OUTPATIENT
Start: 2020-12-25 | End: 2021-01-11 | Stop reason: HOSPADM

## 2020-12-25 RX ADMIN — HYDRALAZINE HYDROCHLORIDE 25 MG: 50 TABLET, FILM COATED ORAL at 09:10

## 2020-12-25 RX ADMIN — HYDRALAZINE HYDROCHLORIDE 25 MG: 50 TABLET, FILM COATED ORAL at 16:14

## 2020-12-25 RX ADMIN — Medication 1 EACH: at 03:33

## 2020-12-25 RX ADMIN — FAMOTIDINE 20 MG: 20 TABLET, FILM COATED ORAL at 17:42

## 2020-12-25 RX ADMIN — CEFTRIAXONE SODIUM 1 G: 1 INJECTION, POWDER, FOR SOLUTION INTRAMUSCULAR; INTRAVENOUS at 03:31

## 2020-12-25 RX ADMIN — SODIUM BICARBONATE: 84 INJECTION, SOLUTION INTRAVENOUS at 10:26

## 2020-12-25 RX ADMIN — Medication 10 ML: at 03:33

## 2020-12-25 RX ADMIN — HEPARIN SODIUM 5000 UNITS: 5000 INJECTION INTRAVENOUS; SUBCUTANEOUS at 21:42

## 2020-12-25 RX ADMIN — Medication 10 ML: at 21:44

## 2020-12-25 RX ADMIN — HEPARIN SODIUM 5000 UNITS: 5000 INJECTION INTRAVENOUS; SUBCUTANEOUS at 14:21

## 2020-12-25 RX ADMIN — METOPROLOL SUCCINATE 100 MG: 100 TABLET, EXTENDED RELEASE ORAL at 09:11

## 2020-12-25 RX ADMIN — Medication 10 ML: at 14:21

## 2020-12-25 RX ADMIN — FAMOTIDINE 20 MG: 20 TABLET, FILM COATED ORAL at 10:09

## 2020-12-25 NOTE — PROGRESS NOTES
Pt off cardene at start of shift. Maintaining BP. MD aware. Transfer orders received. Pt covid test is negative. Isolation precautions d/c'd. Report called to [facility] on 7th floor.   Pt to be transferred to rm [id number]

## 2020-12-25 NOTE — PROGRESS NOTES
12/24/20 6623   Dual Skin Pressure Injury Assessment   Dual Skin Pressure Injury Assessment WDL   Second Care Provider (Based on [address] [place] [address]) Bam RN   Skin Integumentary   Skin Integumentary (WDL) WDL    Pressure  Injury Documentation No Pressure Injury Noted-Pressure Ulcer Prevention Initiated   Skin Color Appropriate for ethnicity   Skin Condition/Temp Cool   Skin Integrity Intact   Turgor Non-tenting   Hair Growth Present   Nails WDL   Varicosities Absent   Wound Prevention and Protection Methods   Orientation of Wound Prevention Posterior   Location of Wound Prevention Sacrum/Coccyx   Wound Offloading (Prevention Methods) Foam silicone; Turning;Repositioning   Sacrum and heels intact

## 2020-12-25 NOTE — PROGRESS NOTES
[facility] PROGRESS NOTE    Follow up for: JOSSELIN     Subjective:   Patient seen and examined. Chart, notes, labs, imaging, results all reviewed.      ROS:  Gen - no fever, no chills, appetite okay  CV - no chest pain, no orthopnea  Lung - no shortness of breath, no cough  Abd - no tenderness, no nausea, no vomiting  Ext - no edema    Objective:   Exam:  Vitals:    12/24/20 2230 12/24/20 2258 12/25/20 0450 12/25/20 0755   BP: 129/74 (!) 150/95 (!) 133/92 (!) 168/99   Pulse: 99 100 99 99   Resp: 18 18 18 17   Temp:  98.2 °F (36.8 °C) 97.2 °F (36.2 °C) 98.2 °F (36.8 °C)   SpO2: 93% 92% 94% 96%   Weight:       Height:             Intake/Output Summary (Last 24 hours) at 12/25/2020 1028  Last data filed at 12/25/2020 2827  Gross per 24 hour   Intake 3306.44 ml   Output 965 ml   Net 2341.44 ml       Current Facility-Administered Medications   Medication Dose Route Frequency    lip protectant (BLISTEX) ointment 1 Each  1 Each Topical PRN    alum-mag hydroxide-simeth (MYLANTA) oral suspension 30 mL  30 mL Oral Q4H PRN    famotidine (PEPCID) tablet 20 mg  20 mg Oral BID    sodium chloride (NS) flush 5-40 mL  5-40 mL IntraVENous Q8H    sodium chloride (NS) flush 5-40 mL  5-40 mL IntraVENous PRN    acetaminophen (TYLENOL) tablet 650 mg  650 mg Oral Q6H PRN    Or    acetaminophen (TYLENOL) suppository 650 mg  650 mg Rectal Q6H PRN    polyethylene glycol (MIRALAX) packet 17 g  17 g Oral DAILY PRN    promethazine (PHENERGAN) tablet 12.5 mg  12.5 mg Oral Q6H PRN    Or    ondansetron (ZOFRAN) injection 4 mg  4 mg IntraVENous Q6H PRN    sodium bicarbonate (8.4%) 150 mEq in dextrose 5% 1,000 mL infusion   IntraVENous CONTINUOUS    cefTRIAXone (ROCEPHIN) 1 g in 0.9% sodium chloride (MBP/ADV) 50 mL MBP  1 g IntraVENous Q24H    hydrALAZINE (APRESOLINE) tablet 25 mg  25 mg Oral TID PRN    metoprolol succinate (TOPROL-XL) XL tablet 100 mg  100 mg Oral DAILY       EXAM  GEN - Alert, oriented, in no distress  CV - S1, S2, RRR, no rub, murmur, or gallop  Lung - clear to auscultation bilaterally  Abd - soft, nontender, BS present  Ext - no edema    Recent Labs     12/25/20  0520 12/24/20  0016   WBC 7.3 9.5   HGB 10.7* 10.7*   HCT 32.3* 32.1*    291        Recent Labs     12/25/20  0520 12/24/20  0646 12/24/20  0016   *  --  130*   K 3.9  --  4.1   CL 97*  --  94*   CO2 23  --  18*   BUN 95*  --  132*   CREA 13.40*  --  15.70*   CA 8.1*  --  8.1*   *  --  189*   MG  --   --  2.2   PHOS  --  8.2*  --        Assessment and Plan:      JOSSELIN versus progression of CKD - No recent cr available for review other than one on oct 2019 with cr 0.9   Will do serologies as UA shows hematuria and proteinuria a  Pyelonephritis seen on CT done in out pt setting     US kidney chronic echogenicity seen   Started on HD today secondary to severe azotemia  and oliguria    Next hd tomorrow     Metabolic  Acidosis - resolved with hd , discontinued drip       Anemia     HTN     Discussed with Mother over the phone           Adilia Segura MD

## 2020-12-25 NOTE — PROGRESS NOTES
Hospitalist Note     Admit Date:  2020 10:57 PM   Name:  Marlo Herrera   Age:  37 y.o.  :  1985   MRN:  [id number]   PCP:  Blanca Alves MD  Treatment Team: Attending Provider: Mg Petit MD; Consulting Provider: Sukumar Landry NP; Utilization Review: Terrell Cabello RN; Charge Nurse: Elda Laureano    HPI/Subjective:   Patient 35F with pmhx of HTN currently not on antihypertensive medications presents from Urgent care where she was seen for report of abdominal pain. She was found to be in acute renal failure and was started on HD .     : pt seen and examined at bedside. States that she tolerated dialysis yesterday well. Denies any chest pain or pressure, SOB, lightheadedness, dizziness, palpitations, near syncope or syncope. She does complain of abd pain with indigestion. She has collins in and states she feels like she needs to urinate.  Only 50cc in catheter bag this AM.     No other complaints  Objective:     Patient Vitals for the past 24 hrs:   Temp Pulse Resp BP SpO2   20 1127 97.5 °F (36.4 °C) 88 18 (!) 147/97 90 %   20 0755 98.2 °F (36.8 °C) 99 17 (!) 168/99 96 %   20 0450 97.2 °F (36.2 °C) 99 18 (!) 133/92 94 %   20 2258 98.2 °F (36.8 °C) 100 18 (!) 150/95 92 %   20 -- 99 18 129/74 93 %   20 -- 94 29 (!) 147/82 --   20 -- 96 24 (!) 152/91 92 %   20 -- 94 17 (!) 127/90 --   20 -- 96 28 (!) 155/95 --   20 -- 99 13 (!) 142/96 --   20 -- 95 17 (!) 150/85 93 %   20 -- 96 15 (!) 148/91 --   20 -- 96 17 133/87 --   20 -- 96 22 (!) 157/81 --   20 -- 100 10 (!) 140/83 94 %   20 -- (!) 102 16 (!) 119/90 --   20 1930 -- 98 14 124/75 --   20 1915 -- 97 12 124/72 --   20 1900 98 °F (36.7 °C) 97 22 108/73 92 %   20 1816 -- 97 19 113/75 --   20 1801 -- 99 13 (!) 149/75 --   20 1746 -- 100 25 (!) 156/80 --   12/24/20 1731 -- (!) 103 15 137/83 --   12/24/20 1716 -- (!) 103 13 131/77 --   12/24/20 1701 -- (!) 110 12 136/70 --   12/24/20 1646 -- (!) 103 15 136/82 --   12/24/20 1631 -- (!) 103 22 (!) 152/81 --   12/24/20 1616 -- (!) 105 16 (!) 153/82 --   12/24/20 1601 98.3 °F (36.8 °C) (!) 103 17 (!) 150/79 --   12/24/20 1546 -- (!) 105 (!) 42 (!) 140/80 --   12/24/20 1531 -- (!) 107 (!) 7 124/78 --   12/24/20 1516 -- (!) 103 24 101/65 --   12/24/20 1501 -- (!) 102 23 123/67 --   12/24/20 1446 -- (!) 104 27 (!) 121/56 --   12/24/20 1436 -- -- -- -- 98 %   12/24/20 1431 -- (!) 105 17 110/67 --   12/24/20 1416 -- (!) 104 23 -- --   12/24/20 1415 -- (!) 108 28 116/76 --   12/24/20 1401 -- 99 8 (!) 160/78 --   12/24/20 1400 98 °F (36.7 °C) (!) 104 14 (!) 160/78 98 %   12/24/20 1330 -- (!) 103 16 (!) 151/80 --   12/24/20 1300 -- (!) 106 14 (!) 138/93 --     Oxygen Therapy  O2 Sat (%): 90 % (12/25/20 1127)  Pulse via Oximetry: 103 beats per minute (12/24/20 1400)  O2 Device: Nasal cannula (12/24/20 1930)  O2 Flow Rate (L/min): 2 l/min (12/24/20 1930)    Estimated body mass index is 45.76 kg/m² as calculated from the following:    Height as of this encounter: 5' 5\" (1.651 m). Weight as of this encounter: 124.7 kg (275 lb). Intake/Output Summary (Last 24 hours) at 12/25/2020 1236  Last data filed at 12/25/2020 5349  Gross per 24 hour   Intake 3306.44 ml   Output 965 ml   Net 2341.44 ml       *Note that automatically entered I/Os may not be accurate; dependent on patient compliance with collection and accurate  by techs. General:    Well nourished. Alert. Obese  CV:   RRR. No murmur, rub, or gallop. Lungs:   CTAB. No wheezing, rhonchi, or rales. Abdomen:   Soft, nontender, nondistended. Extremities: Warm and dry. No cyanosis or edema. Skin:     No rashes or jaundice.    Neuro:  No gross focal deficits    Data Reviewed:  I have reviewed all labs, meds, and studies from the last 24 hours:  Recent Results (from the past 24 hour(s))   METABOLIC PANEL, COMPREHENSIVE    Collection Time: 12/25/20  5:20 AM   Result Value Ref Range    Sodium 131 (L) 136 - 145 mmol/L    Potassium 3.9 3.5 - 5.1 mmol/L    Chloride 97 (L) 98 - 107 mmol/L    CO2 23 21 - 32 mmol/L    Anion gap 11 7 - 16 mmol/L    Glucose 203 (H) 65 - 100 mg/dL    BUN 95 (H) 6 - 23 MG/DL    Creatinine 13.40 (H) 0.6 - 1.0 MG/DL    GFR est AA 4 (L) >60 ml/min/1.73m2    GFR est non-AA 3 (L) >60 ml/min/1.73m2    Calcium 8.1 (L) 8.3 - 10.4 MG/DL    Bilirubin, total 0.3 0.2 - 1.1 MG/DL    ALT (SGPT) 31 12 - 65 U/L    AST (SGOT) 72 (H) 15 - 37 U/L    Alk. phosphatase 113 50 - 136 U/L    Protein, total 6.7 6.3 - 8.2 g/dL    Albumin 1.7 (L) 3.5 - 5.0 g/dL    Globulin 5.0 (H) 2.3 - 3.5 g/dL    A-G Ratio 0.3 (L) 1.2 - 3.5     CBC WITH AUTOMATED DIFF    Collection Time: 12/25/20  5:20 AM   Result Value Ref Range    WBC 7.3 4.3 - 11.1 K/uL    RBC 4.32 4.05 - 5.2 M/uL    HGB 10.7 (L) 11.7 - 15.4 g/dL    HCT 32.3 (L) 35.8 - 46.3 %    MCV 74.8 (L) 79.6 - 97.8 FL    MCH 24.8 (L) 26.1 - 32.9 PG    MCHC 33.1 31.4 - 35.0 g/dL    RDW 13.9 11.9 - 14.6 %    PLATELET 663 448 - 228 K/uL    MPV 10.4 9.4 - 12.3 FL    ABSOLUTE NRBC 0.00 0.0 - 0.2 K/uL    DF AUTOMATED      NEUTROPHILS 73 43 - 78 %    LYMPHOCYTES 13 13 - 44 %    MONOCYTES 10 4.0 - 12.0 %    EOSINOPHILS 0 (L) 0.5 - 7.8 %    BASOPHILS 0 0.0 - 2.0 %    IMMATURE GRANULOCYTES 3 0.0 - 5.0 %    ABS. NEUTROPHILS 5.3 1.7 - 8.2 K/UL    ABS. LYMPHOCYTES 1.0 0.5 - 4.6 K/UL    ABS. MONOCYTES 0.7 0.1 - 1.3 K/UL    ABS. EOSINOPHILS 0.0 0.0 - 0.8 K/UL    ABS. BASOPHILS 0.0 0.0 - 0.2 K/UL    ABS. IMM.  GRANS. 0.2 0.0 - 0.5 K/UL        Current Meds:  Current Facility-Administered Medications   Medication Dose Route Frequency    lip protectant (BLISTEX) ointment 1 Each  1 Each Topical PRN    alum-mag hydroxide-simeth (MYLANTA) oral suspension 30 mL  30 mL Oral Q4H PRN    famotidine (PEPCID) tablet 20 mg  20 mg Oral BID  sodium chloride (NS) flush 5-40 mL  5-40 mL IntraVENous Q8H    sodium chloride (NS) flush 5-40 mL  5-40 mL IntraVENous PRN    acetaminophen (TYLENOL) tablet 650 mg  650 mg Oral Q6H PRN    Or    acetaminophen (TYLENOL) suppository 650 mg  650 mg Rectal Q6H PRN    polyethylene glycol (MIRALAX) packet 17 g  17 g Oral DAILY PRN    promethazine (PHENERGAN) tablet 12.5 mg  12.5 mg Oral Q6H PRN    Or    ondansetron (ZOFRAN) injection 4 mg  4 mg IntraVENous Q6H PRN    cefTRIAXone (ROCEPHIN) 1 g in 0.9% sodium chloride (MBP/ADV) 50 mL MBP  1 g IntraVENous Q24H    hydrALAZINE (APRESOLINE) tablet 25 mg  25 mg Oral TID PRN    metoprolol succinate (TOPROL-XL) XL tablet 100 mg  100 mg Oral DAILY       Other Studies:  No results found for this visit on 12/23/20. No results found.     All Micro Results     Procedure Component Value Units Date/Time    CULTURE, URINE [[id number]] Collected: 12/24/20 0016    Order Status: Completed Specimen: Cath Urine Updated: 12/25/20 0807     Special Requests: NO SPECIAL REQUESTS        Culture result:       >100,000 COLONIES/mL ORGANISM IN STUDY                SARS-CoV-2 Lab Results  \"Novel Coronavirus\" Test: No results found for: COV2NT   \"Emergent Disease\" Test: No results found for: EDPR  \"SARS-COV-2\" Test: No results found for: XGCOVT  Rapid Test:   Lab Results   Component Value Date/Time    COVR Not detected 12/24/2020 12:56 AM            Assessment and Plan:     Hospital Problems as of 12/25/2020 Never Reviewed          Codes Class Noted - Resolved POA    Acute renal failure (ARF) ([facility] [address] ICD-10-CM: N17.9  ICD-9-CM: 584.9  12/24/2020 - Present Unknown        Elevated procalcitonin ICD-10-CM: R79.89  ICD-9-CM: 790.99  12/24/2020 - Present Unknown        Obesity ICD-10-CM: E66.9  ICD-9-CM: 278.00  12/24/2020 - Present Unknown        Hx of essential hypertension ICD-10-CM: Z86.79  ICD-9-CM: V12.59  12/24/2020 - Present Unknown        Microcytic anemia ICD-10-CM: D50.9  ICD-9-CM: 280.9  12/24/2020 - Present Unknown              Plan:  # Acute renal failure - with significant proteinuria w/o edema on exam.   - s/p bicarb ivf, d/c'ed  - Strict I/O, collins in place  - Serologies ordered- DEVEN, complement levels, UPEP, SPEP, Hepatitis, ANCA  - urine culture with 100k orgasnism in study, cont on Rocephin, awaiting speciation and sensitivity  - Renal ultrasound without hydronephrosis or acute obstruction  - nephrology consulted, temporary dialysis catheter placed 12/24, underwent dialysis 12/24 with plan to repeat HD tomorrow     # Bibasilar infiltrates on CT from urgent care  - r/o Covid-19 (rapid negative), PCR negative  - likely pulm edema from poor oncotic pressure and ARF  - satting well on nasal canula     #  HTN emergency  - s/p cardene  - cont with toprol 100mg  - prn hydralazine  - not on OP meds    #GERD/indigestion  - pepcid BID  - maalox prn      # microcytic anemia  - iron wnl     DC planning/Dispo:  Pending clinical improvement.       Diet:  DIET RENAL  DVT ppx:  Heparin subq    Signed:  Luda Shepard MD

## 2020-12-26 LAB
ALBUMIN SERPL-MCNC: 1.8 G/DL (ref 3.5–5)
ALBUMIN/GLOB SERPL: 0.3 {RATIO} (ref 1.2–3.5)
ALP SERPL-CCNC: 144 U/L (ref 50–136)
ALT SERPL-CCNC: 29 U/L (ref 12–65)
ANION GAP SERPL CALC-SCNC: 17 MMOL/L (ref 7–16)
AST SERPL-CCNC: 67 U/L (ref 15–37)
BACTERIA SPEC CULT: NORMAL
BACTERIA SPEC CULT: NORMAL
BASOPHILS # BLD: 0.1 K/UL (ref 0–0.2)
BASOPHILS NFR BLD: 1 % (ref 0–2)
BILIRUB SERPL-MCNC: 0.3 MG/DL (ref 0.2–1.1)
BUN SERPL-MCNC: 104 MG/DL (ref 6–23)
CALCIUM SERPL-MCNC: 8.7 MG/DL (ref 8.3–10.4)
CENTROMERE B AB SER-ACNC: <0.2 AI (ref 0–0.9)
CHLORIDE SERPL-SCNC: 92 MMOL/L (ref 98–107)
CHROMATIN AB SERPL-ACNC: <0.2 AI (ref 0–0.9)
CO2 SERPL-SCNC: 23 MMOL/L (ref 21–32)
CREAT SERPL-MCNC: 14.6 MG/DL (ref 0.6–1)
DIFFERENTIAL METHOD BLD: ABNORMAL
DSDNA AB SER-ACNC: <1 IU/ML (ref 0–9)
ENA JO1 AB SER-ACNC: <0.2 AI (ref 0–0.9)
ENA RNP AB SER-ACNC: <0.2 AI (ref 0–0.9)
ENA SCL70 AB SER-ACNC: <0.2 AI (ref 0–0.9)
ENA SM AB SER-ACNC: <0.2 AI (ref 0–0.9)
ENA SS-A AB SER-ACNC: <0.2 AI (ref 0–0.9)
ENA SS-B AB SER-ACNC: <0.2 AI (ref 0–0.9)
EOSINOPHIL # BLD: 0 K/UL (ref 0–0.8)
EOSINOPHIL NFR BLD: 0 % (ref 0.5–7.8)
ERYTHROCYTE [DISTWIDTH] IN BLOOD BY AUTOMATED COUNT: 13.9 % (ref 11.9–14.6)
EST. AVERAGE GLUCOSE BLD GHB EST-MCNC: 246 MG/DL
GLOBULIN SER CALC-MCNC: 5.3 G/DL (ref 2.3–3.5)
GLUCOSE BLD STRIP.AUTO-MCNC: 155 MG/DL (ref 65–100)
GLUCOSE BLD STRIP.AUTO-MCNC: 178 MG/DL (ref 65–100)
GLUCOSE SERPL-MCNC: 129 MG/DL (ref 65–100)
HBA1C MFR BLD: 10.2 % (ref 4.8–6)
HCT VFR BLD AUTO: 33.1 % (ref 35.8–46.3)
HGB BLD-MCNC: 10.8 G/DL (ref 11.7–15.4)
IMM GRANULOCYTES # BLD AUTO: 0.5 K/UL (ref 0–0.5)
IMM GRANULOCYTES NFR BLD AUTO: 5 % (ref 0–5)
LYMPHOCYTES # BLD: 1.2 K/UL (ref 0.5–4.6)
LYMPHOCYTES NFR BLD: 12 % (ref 13–44)
MCH RBC QN AUTO: 24.9 PG (ref 26.1–32.9)
MCHC RBC AUTO-ENTMCNC: 32.6 G/DL (ref 31.4–35)
MCV RBC AUTO: 76.3 FL (ref 79.6–97.8)
MONOCYTES # BLD: 1 K/UL (ref 0.1–1.3)
MONOCYTES NFR BLD: 10 % (ref 4–12)
NEUTS SEG # BLD: 7 K/UL (ref 1.7–8.2)
NEUTS SEG NFR BLD: 73 % (ref 43–78)
NRBC # BLD: 0 K/UL (ref 0–0.2)
PLATELET # BLD AUTO: 444 K/UL (ref 150–450)
PMV BLD AUTO: 10.2 FL (ref 9.4–12.3)
POTASSIUM SERPL-SCNC: 3.8 MMOL/L (ref 3.5–5.1)
PROT SERPL-MCNC: 7.1 G/DL (ref 6.3–8.2)
RBC # BLD AUTO: 4.34 M/UL (ref 4.05–5.2)
SEE BELOW, 164869: NORMAL
SERVICE CMNT-IMP: NORMAL
SODIUM SERPL-SCNC: 132 MMOL/L (ref 136–145)
WBC # BLD AUTO: 9.7 K/UL (ref 4.3–11.1)

## 2020-12-26 PROCEDURE — 83036 HEMOGLOBIN GLYCOSYLATED A1C: CPT

## 2020-12-26 PROCEDURE — 80053 COMPREHEN METABOLIC PANEL: CPT

## 2020-12-26 PROCEDURE — 82962 GLUCOSE BLOOD TEST: CPT

## 2020-12-26 PROCEDURE — 74011250637 HC RX REV CODE- 250/637: Performed by: INTERNAL MEDICINE

## 2020-12-26 PROCEDURE — 90935 HEMODIALYSIS ONE EVALUATION: CPT

## 2020-12-26 PROCEDURE — 74011250637 HC RX REV CODE- 250/637: Performed by: STUDENT IN AN ORGANIZED HEALTH CARE EDUCATION/TRAINING PROGRAM

## 2020-12-26 PROCEDURE — 65660000000 HC RM CCU STEPDOWN

## 2020-12-26 PROCEDURE — 74011000258 HC RX REV CODE- 258: Performed by: INTERNAL MEDICINE

## 2020-12-26 PROCEDURE — 74011636637 HC RX REV CODE- 636/637: Performed by: STUDENT IN AN ORGANIZED HEALTH CARE EDUCATION/TRAINING PROGRAM

## 2020-12-26 PROCEDURE — 85025 COMPLETE CBC W/AUTO DIFF WBC: CPT

## 2020-12-26 PROCEDURE — 36415 COLL VENOUS BLD VENIPUNCTURE: CPT

## 2020-12-26 PROCEDURE — 74011250636 HC RX REV CODE- 250/636: Performed by: INTERNAL MEDICINE

## 2020-12-26 PROCEDURE — 74011250636 HC RX REV CODE- 250/636: Performed by: STUDENT IN AN ORGANIZED HEALTH CARE EDUCATION/TRAINING PROGRAM

## 2020-12-26 RX ORDER — INSULIN LISPRO 100 [IU]/ML
0-10 INJECTION, SOLUTION INTRAVENOUS; SUBCUTANEOUS
Status: DISCONTINUED | OUTPATIENT
Start: 2020-12-26 | End: 2021-01-11 | Stop reason: HOSPADM

## 2020-12-26 RX ORDER — LABETALOL 200 MG/1
200 TABLET, FILM COATED ORAL 3 TIMES DAILY
Status: DISCONTINUED | OUTPATIENT
Start: 2020-12-26 | End: 2020-12-27

## 2020-12-26 RX ORDER — AMLODIPINE BESYLATE 10 MG/1
10 TABLET ORAL DAILY
Status: DISCONTINUED | OUTPATIENT
Start: 2020-12-26 | End: 2020-12-31

## 2020-12-26 RX ADMIN — HEPARIN SODIUM 5000 UNITS: 5000 INJECTION INTRAVENOUS; SUBCUTANEOUS at 04:08

## 2020-12-26 RX ADMIN — FAMOTIDINE 20 MG: 20 TABLET, FILM COATED ORAL at 17:54

## 2020-12-26 RX ADMIN — FAMOTIDINE 20 MG: 20 TABLET, FILM COATED ORAL at 11:33

## 2020-12-26 RX ADMIN — AMLODIPINE BESYLATE 10 MG: 10 TABLET ORAL at 11:34

## 2020-12-26 RX ADMIN — Medication 10 ML: at 13:48

## 2020-12-26 RX ADMIN — INSULIN LISPRO 2 UNITS: 100 INJECTION, SOLUTION INTRAVENOUS; SUBCUTANEOUS at 17:23

## 2020-12-26 RX ADMIN — HEPARIN SODIUM 5000 UNITS: 5000 INJECTION INTRAVENOUS; SUBCUTANEOUS at 13:47

## 2020-12-26 RX ADMIN — CEFTRIAXONE SODIUM 1 G: 1 INJECTION, POWDER, FOR SOLUTION INTRAMUSCULAR; INTRAVENOUS at 04:02

## 2020-12-26 RX ADMIN — HEPARIN SODIUM 5000 UNITS: 5000 INJECTION INTRAVENOUS; SUBCUTANEOUS at 22:03

## 2020-12-26 RX ADMIN — LABETALOL HYDROCHLORIDE 200 MG: 200 TABLET, FILM COATED ORAL at 22:03

## 2020-12-26 RX ADMIN — HYDRALAZINE HYDROCHLORIDE 25 MG: 50 TABLET, FILM COATED ORAL at 04:02

## 2020-12-26 RX ADMIN — ACETAMINOPHEN 650 MG: 325 TABLET, FILM COATED ORAL at 17:21

## 2020-12-26 RX ADMIN — LABETALOL HYDROCHLORIDE 200 MG: 200 TABLET, FILM COATED ORAL at 17:22

## 2020-12-26 RX ADMIN — Medication 10 ML: at 22:04

## 2020-12-26 RX ADMIN — HYDRALAZINE HYDROCHLORIDE 25 MG: 50 TABLET, FILM COATED ORAL at 11:34

## 2020-12-26 RX ADMIN — INSULIN LISPRO 2 UNITS: 100 INJECTION, SOLUTION INTRAVENOUS; SUBCUTANEOUS at 22:05

## 2020-12-26 RX ADMIN — METOPROLOL SUCCINATE 100 MG: 100 TABLET, EXTENDED RELEASE ORAL at 11:34

## 2020-12-26 RX ADMIN — Medication 10 ML: at 04:08

## 2020-12-26 NOTE — PROGRESS NOTES
Hospitalist Note     Admit Date:  2020 10:57 PM   Name:  Meredith Bauman   Age:  37 y.o.  :  1985   MRN:  [id number]   PCP:  Ginger Joseph MD  Treatment Team: Attending Provider: Naveen Curry MD; Consulting Provider: Yan Castaneda NP; Utilization Review: Severiano Morin, RN; Tech: Nedrowharish Sierra    HPI/Subjective:   Patient 35F with pmhx of HTN currently not on antihypertensive medications presents from Urgent care where she was seen for report of abdominal pain. She was found to be in acute renal failure and was started on HD .     : pt seen and examined at bedside. States that she tolerated dialysis today well, without any issues of chest pain or pressure, SOB, lightheadedness, dizziness, palpitations, near syncope or syncope. She states that her test has started to come back and she is able to eat lunch today without issues. .She continues to make urine, although oliguric.      No other complaints  Objective:     Patient Vitals for the past 24 hrs:   Temp Pulse Resp BP SpO2   20 1107 97.9 °F (36.6 °C) (!) 106 18 (!) 169/125 95 %   20 1053 -- 97 -- (!) 182/108 --   20 1034 -- 93 -- (!) 167/117 --   20 1003 -- 97 -- (!) 189/116 --   20 0930 -- (!) 106 -- (!) 178/115 --   20 0912 -- 94 -- (!) 173/115 --   20 0833 -- 94 -- (!) 178/115 --   20 0810 -- 91 -- (!) 188/112 --   20 0750 -- 96 -- (!) 167/116 --   20 0322 97.9 °F (36.6 °C) 99 18 (!) 157/117 98 %   20 2332 98.3 °F (36.8 °C) 96 18 (!) 164/105 98 %   20 1920 98.2 °F (36.8 °C) 100 20 (!) 161/104 94 %   20 1609 98.1 °F (36.7 °C) 88 19 (!) 154/111 94 %     Oxygen Therapy  O2 Sat (%): 95 % (20 1107)  Pulse via Oximetry: 103 beats per minute (20 1400)  O2 Device: Nasal cannula (20)  O2 Flow Rate (L/min): 2 l/min (20)    Estimated body mass index is 49.07 kg/m² as calculated from the following:    Height as of this encounter: 5' 5\" (1.651 m). Weight as of this encounter: 133.8 kg (294 lb 14.4 oz). Intake/Output Summary (Last 24 hours) at 12/26/2020 1235  Last data filed at 12/26/2020 1053  Gross per 24 hour   Intake --   Output 1150 ml   Net -1150 ml       *Note that automatically entered I/Os may not be accurate; dependent on patient compliance with collection and accurate  by techs. General:    Well nourished. Alert. Obese  CV:   RRR. No murmur, rub, or gallop. Lungs:   CTAB. No wheezing, rhonchi, or rales. Abdomen:   Soft, nontender, nondistended. Extremities: Warm and dry. No cyanosis. 1+ edema BLE   Skin:     No rashes or jaundice. Neuro:  No gross focal deficits    Data Reviewed:  I have reviewed all labs, meds, and studies from the last 24 hours:  Recent Results (from the past 24 hour(s))   CBC WITH AUTOMATED DIFF    Collection Time: 12/26/20  5:50 AM   Result Value Ref Range    WBC 9.7 4.3 - 11.1 K/uL    RBC 4.34 4.05 - 5.2 M/uL    HGB 10.8 (L) 11.7 - 15.4 g/dL    HCT 33.1 (L) 35.8 - 46.3 %    MCV 76.3 (L) 79.6 - 97.8 FL    MCH 24.9 (L) 26.1 - 32.9 PG    MCHC 32.6 31.4 - 35.0 g/dL    RDW 13.9 11.9 - 14.6 %    PLATELET 233 678 - 315 K/uL    MPV 10.2 9.4 - 12.3 FL    ABSOLUTE NRBC 0.00 0.0 - 0.2 K/uL    DF AUTOMATED      NEUTROPHILS 73 43 - 78 %    LYMPHOCYTES 12 (L) 13 - 44 %    MONOCYTES 10 4.0 - 12.0 %    EOSINOPHILS 0 (L) 0.5 - 7.8 %    BASOPHILS 1 0.0 - 2.0 %    IMMATURE GRANULOCYTES 5 0.0 - 5.0 %    ABS. NEUTROPHILS 7.0 1.7 - 8.2 K/UL    ABS. LYMPHOCYTES 1.2 0.5 - 4.6 K/UL    ABS. MONOCYTES 1.0 0.1 - 1.3 K/UL    ABS. EOSINOPHILS 0.0 0.0 - 0.8 K/UL    ABS. BASOPHILS 0.1 0.0 - 0.2 K/UL    ABS. IMM. GRANS.  0.5 0.0 - 0.5 K/UL   METABOLIC PANEL, COMPREHENSIVE    Collection Time: 12/26/20  5:50 AM   Result Value Ref Range    Sodium 132 (L) 136 - 145 mmol/L    Potassium 3.8 3.5 - 5.1 mmol/L    Chloride 92 (L) 98 - 107 mmol/L    CO2 23 21 - 32 mmol/L    Anion gap 17 (H) 7 - 16 mmol/L Glucose 129 (H) 65 - 100 mg/dL     (H) 6 - 23 MG/DL    Creatinine 14.60 (H) 0.6 - 1.0 MG/DL    GFR est AA 4 (L) >60 ml/min/1.73m2    GFR est non-AA 3 (L) >60 ml/min/1.73m2    Calcium 8.7 8.3 - 10.4 MG/DL    Bilirubin, total 0.3 0.2 - 1.1 MG/DL    ALT (SGPT) 29 12 - 65 U/L    AST (SGOT) 67 (H) 15 - 37 U/L    Alk. phosphatase 144 (H) 50 - 136 U/L    Protein, total 7.1 6.3 - 8.2 g/dL    Albumin 1.8 (L) 3.5 - 5.0 g/dL    Globulin 5.3 (H) 2.3 - 3.5 g/dL    A-G Ratio 0.3 (L) 1.2 - 3.5          Current Meds:  Current Facility-Administered Medications   Medication Dose Route Frequency    amLODIPine (NORVASC) tablet 10 mg  10 mg Oral DAILY    insulin lispro (HUMALOG) injection 0-10 Units  0-10 Units SubCUTAneous AC&HS    lip protectant (BLISTEX) ointment 1 Each  1 Each Topical PRN    alum-mag hydroxide-simeth (MYLANTA) oral suspension 30 mL  30 mL Oral Q4H PRN    famotidine (PEPCID) tablet 20 mg  20 mg Oral BID    heparin (porcine) injection 5,000 Units  5,000 Units SubCUTAneous Q8H    sodium chloride (NS) flush 5-40 mL  5-40 mL IntraVENous Q8H    sodium chloride (NS) flush 5-40 mL  5-40 mL IntraVENous PRN    acetaminophen (TYLENOL) tablet 650 mg  650 mg Oral Q6H PRN    Or    acetaminophen (TYLENOL) suppository 650 mg  650 mg Rectal Q6H PRN    polyethylene glycol (MIRALAX) packet 17 g  17 g Oral DAILY PRN    promethazine (PHENERGAN) tablet 12.5 mg  12.5 mg Oral Q6H PRN    Or    ondansetron (ZOFRAN) injection 4 mg  4 mg IntraVENous Q6H PRN    cefTRIAXone (ROCEPHIN) 1 g in 0.9% sodium chloride (MBP/ADV) 50 mL MBP  1 g IntraVENous Q24H    hydrALAZINE (APRESOLINE) tablet 25 mg  25 mg Oral TID PRN    metoprolol succinate (TOPROL-XL) XL tablet 100 mg  100 mg Oral DAILY       Other Studies:  No results found for this visit on 12/23/20. No results found.     All Micro Results     Procedure Component Value Units Date/Time    CULTURE, URINE [[id number]] Collected: 12/24/20 0016    Order Status: Completed Specimen: Cath Urine Updated: 12/26/20 0719     Special Requests: NO SPECIAL REQUESTS        Culture result:       >100,000 COLONIES/mL MIXED SKIN JAE ISOLATED                  THREE OR MORE TYPES OF ORGANISMS ARE PRESENT. THIS IS INDICATIVE OF CONTAMINATION DUE TO IMPROPER COLLECTION TECHNIQUE. PLEASE REPEAT COLLECTION UNLESS PATIENT HAS STARTED ANTIBIOTIC TREATMENT.                 SARS-CoV-2 Lab Results  \"Novel Coronavirus\" Test: No results found for: COV2NT   \"Emergent Disease\" Test: No results found for: EDPR  \"SARS-COV-2\" Test: No results found for: XGCOVT  Rapid Test:   Lab Results   Component Value Date/Time    COVR Not detected 12/24/2020 12:56 AM            Assessment and Plan:     Hospital Problems as of 12/26/2020 Never Reviewed          Codes Class Noted - Resolved POA    Acute renal failure (ARF) ([facility] [address] ICD-10-CM: N17.9  ICD-9-CM: 584.9  12/24/2020 - Present Unknown        Elevated procalcitonin ICD-10-CM: R79.89  ICD-9-CM: 790.99  12/24/2020 - Present Unknown        Obesity ICD-10-CM: E66.9  ICD-9-CM: 278.00  12/24/2020 - Present Unknown        Hx of essential hypertension ICD-10-CM: Z86.79  ICD-9-CM: V12.59  12/24/2020 - Present Unknown        Microcytic anemia ICD-10-CM: D50.9  ICD-9-CM: 280.9  12/24/2020 - Present Unknown              Plan:  # Acute renal failure - with significant proteinuria w/o edema on exam.   - s/p bicarb ivf, now d/c'ed  - Strict I/O, collins in place  - Serologies ordered- DEVEN, complement levels, UPEP, SPEP, Hepatitis, ANCA  - urine culture with 100k orgasnism - Mixed skin jae, cont rocephin for treatment of possible pyelo   - Renal ultrasound without hydronephrosis or acute obstruction  - nephrology consulted, temporary dialysis catheter placed 12/24, underwent dialysis 12/24 with repeat HD 12/26  - may need to consider renal biopsy, defer to neprho      # Bibasilar infiltrates on CT from urgent care  - r/o Covid-19 (rapid negative), PCR negative  - likely pulm edema from poor oncotic pressure and ARF  - satting well on RA     #  HTN emergency  - non compliant with OP meds  - s/p cardene  - uncontrolled on toprol and norvasc  - start labetalol 200mg TID, stop toprol  - cont norvasc    #GERD/indigestion  - pepcid BID  - maalox prn      # microcytic anemia  - iron wnl     DC planning/Dispo:  Pending clinical improvement.       Diet:  DIET RENAL  DVT ppx:  Heparin subq    Signed:  Aydee Alexis MD

## 2020-12-26 NOTE — PROGRESS NOTES
Problem: Falls - Risk of  Goal: *Absence of Falls  Description: Document Vahid Naranjo Fall Risk and appropriate interventions in the flowsheet.   Outcome: Progressing Towards Goal  Note: Fall Risk Interventions:            Medication Interventions: Bed/chair exit alarm, Patient to call before getting OOB, Teach patient to arise slowly    Elimination Interventions: Bed/chair exit alarm, Call light in reach

## 2020-12-26 NOTE — PROGRESS NOTES
[facility] PROGRESS NOTE    Follow up for: JOSSLEIN     Subjective:   Patient seen and examined. Chart, notes, labs, imaging, results all reviewed.      Seen during second session hd - tolerating well     ROS:  Gen - no fever, no chills, appetite okay  CV - no chest pain, no orthopnea  Lung - no shortness of breath, no cough  Abd - no tenderness, no nausea, no vomiting  Ext - no edema    Objective:   Exam:  Vitals:    12/26/20 0750 12/26/20 0810 12/26/20 0833 12/26/20 0912   BP: (!) 167/116 (!) 188/112 (!) 178/115 (!) 173/115   Pulse: 96 91 94 94   Resp:       Temp:       SpO2:       Weight:       Height:             Intake/Output Summary (Last 24 hours) at 12/26/2020 0928  Last data filed at 12/25/2020 2335  Gross per 24 hour   Intake --   Output 150 ml   Net -150 ml       Current Facility-Administered Medications   Medication Dose Route Frequency    amLODIPine (NORVASC) tablet 10 mg  10 mg Oral DAILY    lip protectant (BLISTEX) ointment 1 Each  1 Each Topical PRN    alum-mag hydroxide-simeth (MYLANTA) oral suspension 30 mL  30 mL Oral Q4H PRN    famotidine (PEPCID) tablet 20 mg  20 mg Oral BID    heparin (porcine) injection 5,000 Units  5,000 Units SubCUTAneous Q8H    sodium chloride (NS) flush 5-40 mL  5-40 mL IntraVENous Q8H    sodium chloride (NS) flush 5-40 mL  5-40 mL IntraVENous PRN    acetaminophen (TYLENOL) tablet 650 mg  650 mg Oral Q6H PRN    Or    acetaminophen (TYLENOL) suppository 650 mg  650 mg Rectal Q6H PRN    polyethylene glycol (MIRALAX) packet 17 g  17 g Oral DAILY PRN    promethazine (PHENERGAN) tablet 12.5 mg  12.5 mg Oral Q6H PRN    Or    ondansetron (ZOFRAN) injection 4 mg  4 mg IntraVENous Q6H PRN    cefTRIAXone (ROCEPHIN) 1 g in 0.9% sodium chloride (MBP/ADV) 50 mL MBP  1 g IntraVENous Q24H    hydrALAZINE (APRESOLINE) tablet 25 mg  25 mg Oral TID PRN    metoprolol succinate (TOPROL-XL) XL tablet 100 mg  100 mg Oral DAILY       EXAM  GEN - Alert, oriented, in no distress  CV - S1, S2, RRR, no rub, murmur, or gallop  Lung - clear to auscultation bilaterally  Abd - soft, nontender, BS present  Ext - no edema    Recent Labs     12/26/20  0550 12/25/20  0520 12/24/20  0016   WBC 9.7 7.3 9.5   HGB 10.8* 10.7* 10.7*   HCT 33.1* 32.3* 32.1*    381 291        Recent Labs     12/26/20  0550 12/25/20  0520 12/24/20  0646 12/24/20  0016   * 131*  --  130*   K 3.8 3.9  --  4.1   CL 92* 97*  --  94*   CO2 23 23  --  18*   * 95*  --  132*   CREA 14.60* 13.40*  --  15.70*   CA 8.7 8.1*  --  8.1*   * 203*  --  189*   MG  --   --   --  2.2   PHOS  --   --  8.2*  --        Assessment and Plan: JOSSELIN versus progression of CKD - No recent cr available for review other than one on oct 2019 with cr 0.9   serologies pending - will decide on need for biopsy pending serologies , most likely ESRD from Uncontrolled and untreated HTN   UA shows hematuria and proteinuria a  Pyelonephritis seen on CT done in out pt setting   Hepatitis and HIV negative     US kidney chronic echogenicity seen   Started on HD today secondary to severe azotemia  and oliguria    Seen on second session hd today       Metabolic  Acidosis - resolved with hd , discontinued drip       Anemia     HTN     Discussed with Mother over the phone . She was diagnosed with HTN 15 yrs back but not been on medication until last year . She was even non compliant with medication with the past 1 yr . Remote h/o kidney stones . Most likely ESRD from untreated and  uncontrolled HTN , awaiting serologies and possibly biopsy if needed .            Romina Price MD

## 2020-12-26 NOTE — DIALYSIS
TRANSFER IN - DIALYSIS    Received patient in dialysis unit  from [facility] (unit) for ordered procedure. Consent verified for renal replacement therapy. Patient alert and vital signs stable. /116 P 96    Hemodialysis initiated using Right CVC. Aspirated and flushed both ports without difficulty. Dressing clean, dry and intact. Machine settings per MD order. Heparin 0 unit bolus and 0 units/hr. Will monitor during treatment.

## 2020-12-26 NOTE — DIALYSIS
TRANSFER OUT- DIALYSIS    Hemodialysis treatment completed without complications. B/P elevated throughout treatment. Patient asleep most of the treatment. /108  P  97       1.0 Kgs removed. Flushed both ports with 10 mL of NS.  CVC dressing clean, dry, and intact, tego caps intact, bilateral lumens wrapped with 4x4 gauze. Patient to 714 after dialysis.

## 2020-12-27 LAB
ANION GAP SERPL CALC-SCNC: 11 MMOL/L (ref 7–16)
ATRIAL RATE: 94 BPM
BASOPHILS # BLD: 0.1 K/UL (ref 0–0.2)
BASOPHILS NFR BLD: 1 % (ref 0–2)
BUN SERPL-MCNC: 72 MG/DL (ref 6–23)
CALCIUM SERPL-MCNC: 8.4 MG/DL (ref 8.3–10.4)
CALCULATED P AXIS, ECG09: 55 DEGREES
CALCULATED R AXIS, ECG10: 11 DEGREES
CALCULATED T AXIS, ECG11: 19 DEGREES
CHLORIDE SERPL-SCNC: 99 MMOL/L (ref 98–107)
CO2 SERPL-SCNC: 25 MMOL/L (ref 21–32)
CREAT SERPL-MCNC: 12 MG/DL (ref 0.6–1)
DIAGNOSIS, 93000: NORMAL
DIFFERENTIAL METHOD BLD: ABNORMAL
EOSINOPHIL # BLD: 0.1 K/UL (ref 0–0.8)
EOSINOPHIL NFR BLD: 1 % (ref 0.5–7.8)
ERYTHROCYTE [DISTWIDTH] IN BLOOD BY AUTOMATED COUNT: 13.9 % (ref 11.9–14.6)
GLUCOSE BLD STRIP.AUTO-MCNC: 178 MG/DL (ref 65–100)
GLUCOSE BLD STRIP.AUTO-MCNC: 179 MG/DL (ref 65–100)
GLUCOSE BLD STRIP.AUTO-MCNC: 183 MG/DL (ref 65–100)
GLUCOSE SERPL-MCNC: 158 MG/DL (ref 65–100)
HCT VFR BLD AUTO: 32.1 % (ref 35.8–46.3)
HGB BLD-MCNC: 10.3 G/DL (ref 11.7–15.4)
IMM GRANULOCYTES # BLD AUTO: 0.7 K/UL (ref 0–0.5)
IMM GRANULOCYTES NFR BLD AUTO: 7 % (ref 0–5)
LYMPHOCYTES # BLD: 1.1 K/UL (ref 0.5–4.6)
LYMPHOCYTES NFR BLD: 12 % (ref 13–44)
MCH RBC QN AUTO: 25 PG (ref 26.1–32.9)
MCHC RBC AUTO-ENTMCNC: 32.1 G/DL (ref 31.4–35)
MCV RBC AUTO: 77.9 FL (ref 79.6–97.8)
MONOCYTES # BLD: 1 K/UL (ref 0.1–1.3)
MONOCYTES NFR BLD: 11 % (ref 4–12)
NEUTS SEG # BLD: 6.3 K/UL (ref 1.7–8.2)
NEUTS SEG NFR BLD: 68 % (ref 43–78)
NRBC # BLD: 0 K/UL (ref 0–0.2)
P-R INTERVAL, ECG05: 188 MS
PLATELET # BLD AUTO: 490 K/UL (ref 150–450)
PLATELET COMMENTS,PCOM: SLIGHT
PMV BLD AUTO: 10 FL (ref 9.4–12.3)
POTASSIUM SERPL-SCNC: 3.9 MMOL/L (ref 3.5–5.1)
Q-T INTERVAL, ECG07: 386 MS
QRS DURATION, ECG06: 100 MS
QTC CALCULATION (BEZET), ECG08: 482 MS
RBC # BLD AUTO: 4.12 M/UL (ref 4.05–5.2)
RBC MORPH BLD: ABNORMAL
SODIUM SERPL-SCNC: 135 MMOL/L (ref 136–145)
VENTRICULAR RATE, ECG03: 94 BPM
WBC # BLD AUTO: 9.3 K/UL (ref 4.3–11.1)
WBC MORPH BLD: ABNORMAL

## 2020-12-27 PROCEDURE — 74011000258 HC RX REV CODE- 258: Performed by: INTERNAL MEDICINE

## 2020-12-27 PROCEDURE — 36415 COLL VENOUS BLD VENIPUNCTURE: CPT

## 2020-12-27 PROCEDURE — 65660000000 HC RM CCU STEPDOWN

## 2020-12-27 PROCEDURE — 74011250637 HC RX REV CODE- 250/637: Performed by: STUDENT IN AN ORGANIZED HEALTH CARE EDUCATION/TRAINING PROGRAM

## 2020-12-27 PROCEDURE — 93005 ELECTROCARDIOGRAM TRACING: CPT | Performed by: INTERNAL MEDICINE

## 2020-12-27 PROCEDURE — 80048 BASIC METABOLIC PNL TOTAL CA: CPT

## 2020-12-27 PROCEDURE — 74011250636 HC RX REV CODE- 250/636: Performed by: INTERNAL MEDICINE

## 2020-12-27 PROCEDURE — 82962 GLUCOSE BLOOD TEST: CPT

## 2020-12-27 PROCEDURE — 74011636637 HC RX REV CODE- 636/637: Performed by: STUDENT IN AN ORGANIZED HEALTH CARE EDUCATION/TRAINING PROGRAM

## 2020-12-27 PROCEDURE — 85025 COMPLETE CBC W/AUTO DIFF WBC: CPT

## 2020-12-27 PROCEDURE — 74011250636 HC RX REV CODE- 250/636: Performed by: STUDENT IN AN ORGANIZED HEALTH CARE EDUCATION/TRAINING PROGRAM

## 2020-12-27 RX ORDER — FAMOTIDINE 20 MG/1
20 TABLET, FILM COATED ORAL DAILY
Status: DISCONTINUED | OUTPATIENT
Start: 2020-12-28 | End: 2021-01-11 | Stop reason: HOSPADM

## 2020-12-27 RX ADMIN — CEFTRIAXONE SODIUM 1 G: 1 INJECTION, POWDER, FOR SOLUTION INTRAMUSCULAR; INTRAVENOUS at 04:29

## 2020-12-27 RX ADMIN — HEPARIN SODIUM 5000 UNITS: 5000 INJECTION INTRAVENOUS; SUBCUTANEOUS at 21:27

## 2020-12-27 RX ADMIN — AMLODIPINE BESYLATE 10 MG: 10 TABLET ORAL at 08:49

## 2020-12-27 RX ADMIN — HEPARIN SODIUM 5000 UNITS: 5000 INJECTION INTRAVENOUS; SUBCUTANEOUS at 12:38

## 2020-12-27 RX ADMIN — Medication 10 ML: at 21:23

## 2020-12-27 RX ADMIN — INSULIN LISPRO 2 UNITS: 100 INJECTION, SOLUTION INTRAVENOUS; SUBCUTANEOUS at 08:50

## 2020-12-27 RX ADMIN — Medication 10 ML: at 04:29

## 2020-12-27 RX ADMIN — LABETALOL HYDROCHLORIDE 300 MG: 200 TABLET, FILM COATED ORAL at 21:27

## 2020-12-27 RX ADMIN — LABETALOL HYDROCHLORIDE 300 MG: 200 TABLET, FILM COATED ORAL at 16:37

## 2020-12-27 RX ADMIN — Medication 5 ML: at 14:00

## 2020-12-27 RX ADMIN — LABETALOL HYDROCHLORIDE 200 MG: 200 TABLET, FILM COATED ORAL at 08:51

## 2020-12-27 RX ADMIN — INSULIN LISPRO 2 UNITS: 100 INJECTION, SOLUTION INTRAVENOUS; SUBCUTANEOUS at 16:37

## 2020-12-27 RX ADMIN — FAMOTIDINE 20 MG: 20 TABLET, FILM COATED ORAL at 08:49

## 2020-12-27 RX ADMIN — Medication 10 ML: at 21:26

## 2020-12-27 RX ADMIN — INSULIN LISPRO 2 UNITS: 100 INJECTION, SOLUTION INTRAVENOUS; SUBCUTANEOUS at 12:33

## 2020-12-27 RX ADMIN — INSULIN LISPRO 2 UNITS: 100 INJECTION, SOLUTION INTRAVENOUS; SUBCUTANEOUS at 21:26

## 2020-12-27 RX ADMIN — HEPARIN SODIUM 5000 UNITS: 5000 INJECTION INTRAVENOUS; SUBCUTANEOUS at 04:29

## 2020-12-27 NOTE — PROGRESS NOTES
Hospitalist Note     Admit Date:  2020 10:57 PM   Name:  Breezy Ybarra   Age:  37 y.o.  :  1985   MRN:  [id number]   PCP:  Maximiliano Calderon MD  Treatment Team: Attending Provider: Hola Stokes MD; Consulting Provider: Castro Kelly NP; Utilization Review: Adelia Elise RN; Tech: Dion Alonzo    HPI/Subjective:   Patient 35F with pmhx of HTN currently not on antihypertensive medications presents from Urgent care where she was seen for report of abdominal pain. She was found to be in acute renal failure and was started on HD .     : pt seen and examined at bedside. Denies any chest pain or pressure, SOB, lightheadedness, palpitations, near syncope or syncope. She did have alittle bit of dizziness prior to labetalol administration yesterday. No other complaints  Objective:     Patient Vitals for the past 24 hrs:   Temp Pulse Resp BP SpO2   20 1252 97.9 °F (36.6 °C) 96 19 (!) 147/96 97 %   20 0934 98 °F (36.7 °C) (!) 119 17 (!) 158/99 95 %   20 0523 98.2 °F (36.8 °C) 95 17 (!) 152/97 97 %   20 2351 98.1 °F (36.7 °C) 100 17 (!) 139/90 96 %   20 1930 98.3 °F (36.8 °C) 95 16 132/89 94 %   20 1503 98.9 °F (37.2 °C) 76 19 (!) 153/104 91 %     Oxygen Therapy  O2 Sat (%): 97 % (20 1252)  Pulse via Oximetry: 103 beats per minute (20 1400)  O2 Device: Nasal cannula (20)  O2 Flow Rate (L/min): 2 l/min (20)    Estimated body mass index is 49.07 kg/m² as calculated from the following:    Height as of this encounter: 5' 5\" (1.651 m). Weight as of this encounter: 133.8 kg (294 lb 14.4 oz). No intake or output data in the 24 hours ending 20 9418    *Note that automatically entered I/Os may not be accurate; dependent on patient compliance with collection and accurate  by techs. General:    Well nourished. Alert. Obese  CV:   RRR. No murmur, rub, or gallop. Lungs:   CTAB.   No wheezing, rhonchi, or rales. Abdomen:   Soft, nontender, nondistended. Extremities: Warm and dry. No cyanosis. 1+ edema BLE   Skin:     No rashes or jaundice.    Neuro:  No gross focal deficits    Data Reviewed:  I have reviewed all labs, meds, and studies from the last 24 hours:  Recent Results (from the past 24 hour(s))   GLUCOSE, POC    Collection Time: 12/26/20  5:15 PM   Result Value Ref Range    Glucose (POC) 155 (H) 65 - 100 mg/dL   GLUCOSE, POC    Collection Time: 12/26/20  7:55 PM   Result Value Ref Range    Glucose (POC) 178 (H) 65 - 100 mg/dL   EKG, 12 LEAD, INITIAL    Collection Time: 12/27/20  1:09 AM   Result Value Ref Range    Ventricular Rate 94 BPM    Atrial Rate 94 BPM    P-R Interval 188 ms    QRS Duration 100 ms    Q-T Interval 386 ms    QTC Calculation (Bezet) 482 ms    Calculated P Axis 55 degrees    Calculated R Axis 11 degrees    Calculated T Axis 19 degrees    Diagnosis       Normal sinus rhythm  Prolonged QT  Abnormal ECG  No previous ECGs available  Confirmed by Humberto Amanda ([id number]) on 12/27/2020 92:39:97 PM     METABOLIC PANEL, BASIC    Collection Time: 12/27/20  7:05 AM   Result Value Ref Range    Sodium 135 (L) 136 - 145 mmol/L    Potassium 3.9 3.5 - 5.1 mmol/L    Chloride 99 98 - 107 mmol/L    CO2 25 21 - 32 mmol/L    Anion gap 11 7 - 16 mmol/L    Glucose 158 (H) 65 - 100 mg/dL    BUN 72 (H) 6 - 23 MG/DL    Creatinine 12.00 (H) 0.6 - 1.0 MG/DL    GFR est AA 5 (L) >60 ml/min/1.73m2    GFR est non-AA 4 (L) >60 ml/min/1.73m2    Calcium 8.4 8.3 - 10.4 MG/DL   CBC WITH AUTOMATED DIFF    Collection Time: 12/27/20  7:05 AM   Result Value Ref Range    WBC 9.3 4.3 - 11.1 K/uL    RBC 4.12 4.05 - 5.2 M/uL    HGB 10.3 (L) 11.7 - 15.4 g/dL    HCT 32.1 (L) 35.8 - 46.3 %    MCV 77.9 (L) 79.6 - 97.8 FL    MCH 25.0 (L) 26.1 - 32.9 PG    MCHC 32.1 31.4 - 35.0 g/dL    RDW 13.9 11.9 - 14.6 %    PLATELET 035 (H) 715 - 450 K/uL    MPV 10.0 9.4 - 12.3 FL    ABSOLUTE NRBC 0.00 0.0 - 0.2 K/uL    NEUTROPHILS 68 43 - 78 %    LYMPHOCYTES 12 (L) 13 - 44 %    MONOCYTES 11 4.0 - 12.0 %    EOSINOPHILS 1 0.5 - 7.8 %    BASOPHILS 1 0.0 - 2.0 %    IMMATURE GRANULOCYTES 7 (H) 0.0 - 5.0 %    ABS. NEUTROPHILS 6.3 1.7 - 8.2 K/UL    ABS. LYMPHOCYTES 1.1 0.5 - 4.6 K/UL    ABS. MONOCYTES 1.0 0.1 - 1.3 K/UL    ABS. EOSINOPHILS 0.1 0.0 - 0.8 K/UL    ABS. BASOPHILS 0.1 0.0 - 0.2 K/UL    ABS. IMM. GRANS. 0.7 (H) 0.0 - 0.5 K/UL    RBC COMMENTS SLIGHT  MICROCYTOSIS        WBC COMMENTS Result Confirmed By Smear      PLATELET COMMENTS SLIGHT      DF AUTOMATED     GLUCOSE, POC    Collection Time: 12/27/20 11:12 AM   Result Value Ref Range    Glucose (POC) 183 (H) 65 - 100 mg/dL        Current Meds:  Current Facility-Administered Medications   Medication Dose Route Frequency    amLODIPine (NORVASC) tablet 10 mg  10 mg Oral DAILY    insulin lispro (HUMALOG) injection 0-10 Units  0-10 Units SubCUTAneous AC&HS    labetaloL (NORMODYNE) tablet 200 mg  200 mg Oral TID    lip protectant (BLISTEX) ointment 1 Each  1 Each Topical PRN    alum-mag hydroxide-simeth (MYLANTA) oral suspension 30 mL  30 mL Oral Q4H PRN    famotidine (PEPCID) tablet 20 mg  20 mg Oral BID    heparin (porcine) injection 5,000 Units  5,000 Units SubCUTAneous Q8H    sodium chloride (NS) flush 5-40 mL  5-40 mL IntraVENous Q8H    sodium chloride (NS) flush 5-40 mL  5-40 mL IntraVENous PRN    acetaminophen (TYLENOL) tablet 650 mg  650 mg Oral Q6H PRN    Or    acetaminophen (TYLENOL) suppository 650 mg  650 mg Rectal Q6H PRN    polyethylene glycol (MIRALAX) packet 17 g  17 g Oral DAILY PRN    promethazine (PHENERGAN) tablet 12.5 mg  12.5 mg Oral Q6H PRN    cefTRIAXone (ROCEPHIN) 1 g in 0.9% sodium chloride (MBP/ADV) 50 mL MBP  1 g IntraVENous Q24H    hydrALAZINE (APRESOLINE) tablet 25 mg  25 mg Oral TID PRN       Other Studies:  No results found for this visit on 12/23/20. No results found.     All Micro Results     Procedure Component Value Units Date/Time    CULTURE, URINE [[id number]] Collected: 12/24/20 0016    Order Status: Completed Specimen: Cath Urine Updated: 12/26/20 0719     Special Requests: NO SPECIAL REQUESTS        Culture result:       >100,000 COLONIES/mL MIXED SKIN JAE ISOLATED                  THREE OR MORE TYPES OF ORGANISMS ARE PRESENT. THIS IS INDICATIVE OF CONTAMINATION DUE TO IMPROPER COLLECTION TECHNIQUE. PLEASE REPEAT COLLECTION UNLESS PATIENT HAS STARTED ANTIBIOTIC TREATMENT.                 SARS-CoV-2 Lab Results  \"Novel Coronavirus\" Test: No results found for: COV2NT   \"Emergent Disease\" Test: No results found for: EDPR  \"SARS-COV-2\" Test: No results found for: XGCOVT  Rapid Test:   Lab Results   Component Value Date/Time    COVR Not detected 12/24/2020 12:56 AM            Assessment and Plan:     Hospital Problems as of 12/27/2020 Never Reviewed          Codes Class Noted - Resolved POA    Acute renal [facility]) ([facility] ICD-10-CM: N17.9  ICD-9-CM: 584.9  12/24/2020 - Present Unknown        Elevated procalcitonin ICD-10-CM: R79.89  ICD-9-CM: 790.99  12/24/2020 - Present Unknown        Obesity ICD-10-CM: E66.9  ICD-9-CM: 278.00  12/24/2020 - Present Unknown        Hx of essential hypertension ICD-10-CM: Z86.79  ICD-9-CM: V12.59  12/24/2020 - Present Unknown        Microcytic anemia ICD-10-CM: D50.9  ICD-9-CM: 280.9  12/24/2020 - Present Unknown              Plan:  # Acute renal failure - with significant proteinuria w/o edema on exam.   - s/p bicarb ivf, now d/c'ed  - Strict I/O, collins in place  - Serologies ordered- DEVEN, complement levels, UPEP, SPEP, Hepatitis, ANCA  - urine culture with 100k orgasnism - Mixed skin jae, cont rocephin for treatment of possible pyelo   - Renal ultrasound without hydronephrosis or acute obstruction  - nephrology consulted, temporary dialysis catheter placed 12/24, underwent dialysis 12/24 with repeat HD 12/26 without improvement in RF  - repeat HD tomorrow   - may need to consider renal biopsy, defer to neprho      # Bibasilar infiltrates on CT from urgent care  - r/o Covid-19 (rapid negative), PCR negative  - likely pulm edema from poor oncotic pressure and ARF  - satting well on RA     #  HTN  - non compliant with OP meds  - s/p cardene  - uncontrolled on toprol and norvasc  - stop toprol, start labetalol 200mg TID, titrating up   - cont norvasc    #GERD/indigestion  - pepcid BID  - maalox prn      # microcytic anemia  - iron wnl     DC planning/Dispo:  Pending improvement in renal function and determination for long term dialysis needs.        Diet:  DIET RENAL  DVT ppx:  Heparin subq    Signed:  Ksenia Pascal MD

## 2020-12-27 NOTE — PROGRESS NOTES
Per tele during this shift, patients HR dropped to 49 and was in 2nd degree heart block type 2. This seems to have happened already a handful of times since being admitted. MD made aware and STAT EKG ordered and results were reported to MD. No new orders obtained at this time.

## 2020-12-27 NOTE — PROGRESS NOTES
Problem: Falls - Risk of  Goal: *Absence of Falls  Description: Document Andi Mcgowan Fall Risk and appropriate interventions in the flowsheet.   Outcome: Progressing Towards Goal  Note: Fall Risk Interventions:            Medication Interventions: Bed/chair exit alarm, Evaluate medications/consider consulting pharmacy, Assess postural VS orthostatic hypotension    Elimination Interventions: Call light in reach, Bed/chair exit alarm, Elevated toilet seat              Problem: Patient Education: Go to Patient Education Activity  Goal: Patient/Family Education  Outcome: Progressing Towards Goal

## 2020-12-27 NOTE — PROGRESS NOTES
Problem: Falls - Risk of  Goal: *Absence of Falls  Description: Document Milo Chow Fall Risk and appropriate interventions in the flowsheet. Outcome: Progressing Towards Goal  Note: Fall Risk Interventions:            Medication Interventions: Bed/chair exit alarm, Evaluate medications/consider consulting pharmacy, Assess postural VS orthostatic hypotension    Elimination Interventions: Call light in reach, Bed/chair exit alarm, Elevated toilet seat              Problem: Patient Education: Go to Patient Education Activity  Goal: Patient/Family Education  Outcome: Progressing Towards Goal     Problem: Diabetes Self-Management  Goal: *Disease process and treatment process  Description: Define diabetes and identify own type of diabetes; list 3 options for treating diabetes. Outcome: Progressing Towards Goal  Goal: *Incorporating nutritional management into lifestyle  Description: Describe effect of type, amount and timing of food on blood glucose; list 3 methods for planning meals. Outcome: Progressing Towards Goal  Goal: *Incorporating physical activity into lifestyle  Description: State effect of exercise on blood glucose levels. Outcome: Progressing Towards Goal  Goal: *Developing strategies to promote health/change behavior  Description: Define the ABC's of diabetes; identify appropriate screenings, schedule and personal plan for screenings. Outcome: Progressing Towards Goal  Goal: *Using medications safely  Description: State effect of diabetes medications on diabetes; name diabetes medication taking, action and side effects. Outcome: Progressing Towards Goal  Goal: *Monitoring blood glucose, interpreting and using results  Description: Identify recommended blood glucose targets  and personal targets.   Outcome: Progressing Towards Goal  Goal: *Prevention, detection, treatment of acute complications  Description: List symptoms of hyper- and hypoglycemia; describe how to treat low blood sugar and actions for lowering  high blood glucose level. Outcome: Progressing Towards Goal  Goal: *Prevention, detection and treatment of chronic complications  Description: Define the natural course of diabetes and describe the relationship of blood glucose levels to long term complications of diabetes.   Outcome: Progressing Towards Goal  Goal: *Developing strategies to address psychosocial issues  Description: Describe feelings about living with diabetes; identify support needed and support network  Outcome: Progressing Towards Goal  Goal: *Insulin pump training  Outcome: Progressing Towards Goal  Goal: *Sick day guidelines  Outcome: Progressing Towards Goal  Goal: *Patient Specific Goal (EDIT GOAL, INSERT TEXT)  Outcome: Progressing Towards Goal     Problem: Patient Education: Go to Patient Education Activity  Goal: Patient/Family Education  Outcome: Progressing Towards Goal

## 2020-12-27 NOTE — PROGRESS NOTES
Tania Villa  : 1959  Primary:   Secondary:  [address] at [address], [place], [address] [place] [address]  [phone]   [phone]      OUTPATIENT PHYSICAL THERAPY: Daily Treatment Note 2019    ICD-10: Treatment Diagnosis: M25.562 left knee pain                 Treatment Diagnosis 2: R26.89 other abnormalities of gait and mobility                 Treatment Diagnosis 3:   Precautions: squatting  Allergies: Patient has no known allergies. TREATMENT PLAN:  Effective Dates: 2019 TO 2020 (60 days). Frequency/Duration: 2 times a week for 60 Day(s) MEDICAL/REFERRING DIAGNOSIS:  Left knee pain [M25.562]   DATE OF ONSET: L TKA on 10-14-19  REFERRING PHYSICIAN: Lorenza Jones MD MD Orders: Evaluate and Treat   Return MD Appointment: unsure     Pre-treatment Symptoms/Complaints:  Patient reports minimal to no pain today. .    Pain: Initial: Pain Intensity 1: 1  Pain Location 1: Knee  Pain Orientation 1: Left  Post Session:  1/10     Medications Last Reviewed:  2019  Updated Objective Findings: None today   TREATMENT:   THERAPEUTIC EXERCISE: (40 minutes):  Exercises per grid below to improve mobility, strength, balance and coordination. Required minimal verbal cues to promote proper body alignment and promote proper body posture. Progressed resistance, range, repetitions and complexity of movement as indicated.      Date:  2019 Date:  12-3-19 Date:  19   Activity/Exercise Parameters Parameters Parameters   Calf stretch Strap x 5 4x30 sec by therapist and 4 x 30 seconds on incline  Incline x 4   Hamstring stretch Strap x 5 2 x 30 seconds with belt  Strap x 5   Heel slides Active 1 x 10  Strap 1 x 10 3 x 10 with overpressure from therapist  Active 2 x 10  Strap 1 x 10   Straight leg raises   2 X 10 reps     Hip adduction  X 10 with theraball    Hip abduction      Long arc quads 2.5 lbs 3 x 10 X 20 with 4 lbs 4 lbs 2 x 15   Standing hamstring [facility] PROGRESS NOTE    Follow up for: JOSSELIN     Subjective:   Patient seen and examined. Chart, notes, labs, imaging, results all reviewed.          ROS:  Gen - no fever, no chills, appetite okay  CV - no chest pain, no orthopnea  Lung - no shortness of breath, no cough  Abd - no tenderness, no nausea, no vomiting  Ext - no edema    Objective:   Exam:  Vitals:    12/26/20 1930 12/26/20 2351 12/27/20 0523 12/27/20 0934   BP: 132/89 (!) 139/90 (!) 152/97 (!) 158/99   Pulse: 95 100 95 (!) 119   Resp: 16 17 17 17   Temp: 98.3 °F (36.8 °C) 98.1 °F (36.7 °C) 98.2 °F (36.8 °C) 98 °F (36.7 °C)   SpO2: 94% 96% 97% 95%   Weight:       Height:             Intake/Output Summary (Last 24 hours) at 12/27/2020 1026  Last data filed at 12/26/2020 1053  Gross per 24 hour   Intake --   Output 1000 ml   Net -1000 ml       Current Facility-Administered Medications   Medication Dose Route Frequency    amLODIPine (NORVASC) tablet 10 mg  10 mg Oral DAILY    insulin lispro (HUMALOG) injection 0-10 Units  0-10 Units SubCUTAneous AC&HS    labetaloL (NORMODYNE) tablet 200 mg  200 mg Oral TID    lip protectant (BLISTEX) ointment 1 Each  1 Each Topical PRN    alum-mag hydroxide-simeth (MYLANTA) oral suspension 30 mL  30 mL Oral Q4H PRN    famotidine (PEPCID) tablet 20 mg  20 mg Oral BID    heparin (porcine) injection 5,000 Units  5,000 Units SubCUTAneous Q8H    sodium chloride (NS) flush 5-40 mL  5-40 mL IntraVENous Q8H    sodium chloride (NS) flush 5-40 mL  5-40 mL IntraVENous PRN    acetaminophen (TYLENOL) tablet 650 mg  650 mg Oral Q6H PRN    Or    acetaminophen (TYLENOL) suppository 650 mg  650 mg Rectal Q6H PRN    polyethylene glycol (MIRALAX) packet 17 g  17 g Oral DAILY PRN    promethazine (PHENERGAN) tablet 12.5 mg  12.5 mg Oral Q6H PRN    cefTRIAXone (ROCEPHIN) 1 g in 0.9% sodium chloride (MBP/ADV) 50 mL MBP  1 g IntraVENous Q24H    hydrALAZINE (APRESOLINE) tablet 25 mg  25 mg Oral TID PRN       EXAM  GEN - Alert, oriented, in no distress  CV - S1, S2, RRR, no rub, murmur, or gallop  Lung - clear to auscultation bilaterally  Abd - soft, nontender, BS present  Ext - no edema    Recent Labs     12/27/20  0705 12/26/20  0550 12/25/20  0520   WBC 9.3 9.7 7.3   HGB 10.3* 10.8* 10.7*   HCT 32.1* 33.1* 32.3*   * 444 381        Recent Labs     12/27/20  0705 12/26/20  0550 12/25/20  0520   * 132* 131*   K 3.9 3.8 3.9   CL 99 92* 97*   CO2 25 23 23   BUN 72* 104* 95*   CREA 12.00* 14.60* 13.40*   CA 8.4 8.7 8.1*   * 129* 203*       Assessment and Plan: JOSSELIN versus progression of CKD - No recent cr available for review other than one on oct 2019 with cr 0.9   serologies pending - will decide on need for biopsy pending serologies , most likely ESRD from Uncontrolled and untreated HTN   UA shows hematuria and proteinuria a  Pyelonephritis seen on CT done in out pt setting   Hepatitis and HIV negative     US kidney chronic echogenicity seen   Started on HD 12/24, s/p 2 sessions so far , will do hd again in am if no improvement seen       Metabolic  Acidosis - resolved with hd , discontinued drip       Anemia     HTN     Discussed with Mother over the phone . She was diagnosed with HTN 15 yrs back but not been on medication until last year . She was even non compliant with medication with the past 1 yr . Remote h/o kidney stones . Most likely ESRD from untreated and  uncontrolled HTN , awaiting serologies and possibly biopsy if needed .            Jaky Drew MD curls 2.5 lbs 3 x 10 X 20 reps with 4 lbs   4 lbs 2 x 15   Marching in place   X 20 reps with 4 lbs     Sit to stand  Chair/airex 3 x 10 X 10 reps chair with no  blue cushion no UE assist 2 x 10   Short arc quad       Quad sets  X 10 reps with 3 second hold  2 x 10   Chair slides 5 x 30 sec 5x30 sec hold  5 x 30 sec   airdyne  X 6 minutes -3 minutes at seat heightl 6 and 3 minutes at seat height  5     NU step Level 4 x 10  minutes   12 minutes level 3   Side step ups  X 20 to L LE     Step ups  X 10 leading with L LE -----    Heel prop  X 12 minutes with manual  15 minutes with manual   Gluteal sets  X 10 with 3 second hold    Gait training  X 5 minutes in hallways-weight shift to L     Weight shift   3 x 30 seconds to L LE    Ankle pumps  X 20        MANUAL THERAPY: (13 minutes): Soft tissue mobilization was utilized and necessary because of the patient's restricted motion of soft tissue   Soft tissue mobilization distal quads and surrounding soft tissue.  Suction cup to scar to decrease scar adhesions in knee extension and flexion Not today   Gentle over pressure from therapist to increase knee extension with left ankle on 1/2 roll in supine   Cross friction massage over end of scar to decrease keloid scarring. MODALITIES: (0 minutes):      Pt. received moist hot pack with estim to left quad initally to dcrease pain and tightness in left knee. Pt. received vaso pneumatic compression to left knee on incline at the end of session to decrease pain and swelling     Cold pack x 8 minutes to L knee while seated after exercises     HEP: As above; handouts given to patient for all exercises. Treatment/Session Summary:    · Response to Treatment: improved mobility and gait   .    · Communication/Consultation:  None today  · Equipment provided today:  None today  · Recommendations/Intent for next treatment session: Next visit will focus on knee flexibility and conditioning and gait  followed with cryotherapy.-airdyne and nu step for mobility and aerobic work     Total Treatment Billable Duration: 53 minutes  PT Patient Time In/Time Out  Time In: 0803  Time Out: 8616 [facility]

## 2020-12-27 NOTE — PROGRESS NOTES
Problem: Falls - Risk of  Goal: *Absence of Falls  Description: Document Orlando Gerardo Fall Risk and appropriate interventions in the flowsheet. Outcome: Progressing Towards Goal  Note: Fall Risk Interventions:            Medication Interventions: Bed/chair exit alarm, Evaluate medications/consider consulting pharmacy, Assess postural VS orthostatic hypotension    Elimination Interventions: Call light in reach, Bed/chair exit alarm, Elevated toilet seat              Problem: Patient Education: Go to Patient Education Activity  Goal: Patient/Family Education  Outcome: Progressing Towards Goal     Problem: Diabetes Self-Management  Goal: *Disease process and treatment process  Description: Define diabetes and identify own type of diabetes; list 3 options for treating diabetes. Outcome: Progressing Towards Goal  Goal: *Incorporating nutritional management into lifestyle  Description: Describe effect of type, amount and timing of food on blood glucose; list 3 methods for planning meals. Outcome: Progressing Towards Goal  Goal: *Incorporating physical activity into lifestyle  Description: State effect of exercise on blood glucose levels. Outcome: Progressing Towards Goal  Goal: *Developing strategies to promote health/change behavior  Description: Define the ABC's of diabetes; identify appropriate screenings, schedule and personal plan for screenings. Outcome: Progressing Towards Goal  Goal: *Using medications safely  Description: State effect of diabetes medications on diabetes; name diabetes medication taking, action and side effects. Outcome: Progressing Towards Goal  Goal: *Monitoring blood glucose, interpreting and using results  Description: Identify recommended blood glucose targets  and personal targets.   Outcome: Progressing Towards Goal  Goal: *Prevention, detection, treatment of acute complications  Description: List symptoms of hyper- and hypoglycemia; describe how to treat low blood sugar and actions for lowering  high blood glucose level. Outcome: Progressing Towards Goal  Goal: *Prevention, detection and treatment of chronic complications  Description: Define the natural course of diabetes and describe the relationship of blood glucose levels to long term complications of diabetes.   Outcome: Progressing Towards Goal  Goal: *Developing strategies to address psychosocial issues  Description: Describe feelings about living with diabetes; identify support needed and support network  Outcome: Progressing Towards Goal  Goal: *Insulin pump training  Outcome: Progressing Towards Goal  Goal: *Sick day guidelines  Outcome: Progressing Towards Goal  Goal: *Patient Specific Goal (EDIT GOAL, INSERT TEXT)  Outcome: Progressing Towards Goal     Problem: Patient Education: Go to Patient Education Activity  Goal: Patient/Family Education  Outcome: Progressing Towards Goal

## 2020-12-28 LAB
ANION GAP SERPL CALC-SCNC: 10 MMOL/L (ref 7–16)
APTT PPP: 37.2 SEC (ref 24.1–35.1)
BASOPHILS # BLD: 0.1 K/UL (ref 0–0.2)
BASOPHILS NFR BLD: 1 % (ref 0–2)
BUN SERPL-MCNC: 75 MG/DL (ref 6–23)
C-ANCA TITR SER IF: NORMAL TITER
CALCIUM SERPL-MCNC: 8.6 MG/DL (ref 8.3–10.4)
CHLORIDE SERPL-SCNC: 99 MMOL/L (ref 98–107)
CO2 SERPL-SCNC: 27 MMOL/L (ref 21–32)
CREAT SERPL-MCNC: 13.7 MG/DL (ref 0.6–1)
DIFFERENTIAL METHOD BLD: ABNORMAL
EOSINOPHIL # BLD: 0.2 K/UL (ref 0–0.8)
EOSINOPHIL NFR BLD: 2 % (ref 0.5–7.8)
ERYTHROCYTE [DISTWIDTH] IN BLOOD BY AUTOMATED COUNT: 14.1 % (ref 11.9–14.6)
GLUCOSE BLD STRIP.AUTO-MCNC: 106 MG/DL (ref 65–100)
GLUCOSE BLD STRIP.AUTO-MCNC: 163 MG/DL (ref 65–100)
GLUCOSE BLD STRIP.AUTO-MCNC: 176 MG/DL (ref 65–100)
GLUCOSE BLD STRIP.AUTO-MCNC: 177 MG/DL (ref 65–100)
GLUCOSE SERPL-MCNC: 141 MG/DL (ref 65–100)
HCT VFR BLD AUTO: 30.3 % (ref 35.8–46.3)
HGB BLD-MCNC: 9.7 G/DL (ref 11.7–15.4)
IMM GRANULOCYTES # BLD AUTO: 0.7 K/UL (ref 0–0.5)
IMM GRANULOCYTES NFR BLD AUTO: 9 % (ref 0–5)
INR PPP: 1
LYMPHOCYTES # BLD: 1.2 K/UL (ref 0.5–4.6)
LYMPHOCYTES NFR BLD: 15 % (ref 13–44)
MCH RBC QN AUTO: 24.7 PG (ref 26.1–32.9)
MCHC RBC AUTO-ENTMCNC: 32 G/DL (ref 31.4–35)
MCV RBC AUTO: 77.3 FL (ref 79.6–97.8)
MONOCYTES # BLD: 0.9 K/UL (ref 0.1–1.3)
MONOCYTES NFR BLD: 11 % (ref 4–12)
MYELOPEROXIDASE AB SER IA-ACNC: <9 U/ML (ref 0–9)
NEUTS SEG # BLD: 5 K/UL (ref 1.7–8.2)
NEUTS SEG NFR BLD: 62 % (ref 43–78)
NRBC # BLD: 0 K/UL (ref 0–0.2)
P-ANCA ATYPICAL TITR SER IF: NORMAL TITER
P-ANCA TITR SER IF: NORMAL TITER
PLATELET # BLD AUTO: 494 K/UL (ref 150–450)
PMV BLD AUTO: 9.5 FL (ref 9.4–12.3)
POTASSIUM SERPL-SCNC: 4.1 MMOL/L (ref 3.5–5.1)
PROTEINASE3 AB SER IA-ACNC: <3.5 U/ML (ref 0–3.5)
PROTHROMBIN TIME: 13 SEC (ref 12.5–14.7)
RBC # BLD AUTO: 3.92 M/UL (ref 4.05–5.2)
SODIUM SERPL-SCNC: 136 MMOL/L (ref 136–145)
WBC # BLD AUTO: 8.1 K/UL (ref 4.3–11.1)

## 2020-12-28 PROCEDURE — 74011000258 HC RX REV CODE- 258: Performed by: INTERNAL MEDICINE

## 2020-12-28 PROCEDURE — 74011250636 HC RX REV CODE- 250/636: Performed by: STUDENT IN AN ORGANIZED HEALTH CARE EDUCATION/TRAINING PROGRAM

## 2020-12-28 PROCEDURE — 85025 COMPLETE CBC W/AUTO DIFF WBC: CPT

## 2020-12-28 PROCEDURE — 74011250637 HC RX REV CODE- 250/637: Performed by: INTERNAL MEDICINE

## 2020-12-28 PROCEDURE — 82962 GLUCOSE BLOOD TEST: CPT

## 2020-12-28 PROCEDURE — 74011250637 HC RX REV CODE- 250/637: Performed by: NURSE PRACTITIONER

## 2020-12-28 PROCEDURE — 90935 HEMODIALYSIS ONE EVALUATION: CPT

## 2020-12-28 PROCEDURE — 74011636637 HC RX REV CODE- 636/637: Performed by: STUDENT IN AN ORGANIZED HEALTH CARE EDUCATION/TRAINING PROGRAM

## 2020-12-28 PROCEDURE — 74011250637 HC RX REV CODE- 250/637: Performed by: STUDENT IN AN ORGANIZED HEALTH CARE EDUCATION/TRAINING PROGRAM

## 2020-12-28 PROCEDURE — 74011250636 HC RX REV CODE- 250/636: Performed by: INTERNAL MEDICINE

## 2020-12-28 PROCEDURE — 65660000000 HC RM CCU STEPDOWN

## 2020-12-28 PROCEDURE — 85730 THROMBOPLASTIN TIME PARTIAL: CPT

## 2020-12-28 PROCEDURE — 80048 BASIC METABOLIC PNL TOTAL CA: CPT

## 2020-12-28 PROCEDURE — 82570 ASSAY OF URINE CREATININE: CPT

## 2020-12-28 PROCEDURE — 85610 PROTHROMBIN TIME: CPT

## 2020-12-28 RX ORDER — CLONIDINE HYDROCHLORIDE 0.1 MG/1
0.1 TABLET ORAL 2 TIMES DAILY
Status: DISCONTINUED | OUTPATIENT
Start: 2020-12-28 | End: 2020-12-29

## 2020-12-28 RX ORDER — SEVELAMER CARBONATE 800 MG/1
800 TABLET, FILM COATED ORAL
Status: DISCONTINUED | OUTPATIENT
Start: 2020-12-28 | End: 2021-01-11 | Stop reason: HOSPADM

## 2020-12-28 RX ADMIN — LABETALOL HYDROCHLORIDE 300 MG: 200 TABLET, FILM COATED ORAL at 15:56

## 2020-12-28 RX ADMIN — HEPARIN SODIUM 5000 UNITS: 5000 INJECTION INTRAVENOUS; SUBCUTANEOUS at 04:17

## 2020-12-28 RX ADMIN — ACETAMINOPHEN 650 MG: 325 TABLET, FILM COATED ORAL at 15:57

## 2020-12-28 RX ADMIN — LABETALOL HYDROCHLORIDE 300 MG: 200 TABLET, FILM COATED ORAL at 08:57

## 2020-12-28 RX ADMIN — SEVELAMER CARBONATE 800 MG: 800 TABLET, FILM COATED ORAL at 17:34

## 2020-12-28 RX ADMIN — HEPARIN SODIUM 5000 UNITS: 5000 INJECTION INTRAVENOUS; SUBCUTANEOUS at 21:41

## 2020-12-28 RX ADMIN — CEFTRIAXONE SODIUM 1 G: 1 INJECTION, POWDER, FOR SOLUTION INTRAMUSCULAR; INTRAVENOUS at 03:00

## 2020-12-28 RX ADMIN — Medication 10 ML: at 03:00

## 2020-12-28 RX ADMIN — AMLODIPINE BESYLATE 10 MG: 10 TABLET ORAL at 08:57

## 2020-12-28 RX ADMIN — Medication 10 ML: at 21:42

## 2020-12-28 RX ADMIN — CLONIDINE HYDROCHLORIDE 0.1 MG: 0.1 TABLET ORAL at 17:33

## 2020-12-28 RX ADMIN — INSULIN LISPRO 2 UNITS: 100 INJECTION, SOLUTION INTRAVENOUS; SUBCUTANEOUS at 22:10

## 2020-12-28 RX ADMIN — ACETAMINOPHEN 650 MG: 325 TABLET, FILM COATED ORAL at 00:15

## 2020-12-28 RX ADMIN — LABETALOL HYDROCHLORIDE 300 MG: 200 TABLET, FILM COATED ORAL at 21:41

## 2020-12-28 RX ADMIN — FAMOTIDINE 20 MG: 20 TABLET, FILM COATED ORAL at 08:57

## 2020-12-28 RX ADMIN — CLONIDINE HYDROCHLORIDE 0.1 MG: 0.1 TABLET ORAL at 08:58

## 2020-12-28 RX ADMIN — INSULIN LISPRO 2 UNITS: 100 INJECTION, SOLUTION INTRAVENOUS; SUBCUTANEOUS at 08:57

## 2020-12-28 RX ADMIN — Medication 10 ML: at 15:49

## 2020-12-28 RX ADMIN — Medication 10 ML: at 04:17

## 2020-12-28 NOTE — PROGRESS NOTES
[facility] PROGRESS NOTE    Follow up for: JOSSELIN     Subjective:   Patient seen and examined on HD-#3, dialyzing via right temp  Qb, UF 1600 tolerating dialysis.    Labs and chart reviewed- no sing of renal recovery     ROS:  Gen - no fever, no chills, appetite okay  CV - no chest pain, no orthopnea  Lung - no shortness of breath, no cough  Abd - no tenderness, no nausea, no vomiting  Ext - no edema    Objective:   Exam:  Vitals:    12/28/20 0805 12/28/20 1130 12/28/20 1158 12/28/20 1230   BP: (!) 155/93 133/77 (!) 141/87 (!) 138/102   Pulse: 94 97 85 86   Resp: 19 19     Temp: 97.4 °F (36.3 °C) 97.5 °F (36.4 °C)     SpO2: 98% 97%     Weight:       Height:             Intake/Output Summary (Last 24 hours) at 12/28/2020 1254  Last data filed at 12/28/2020 1131  Gross per 24 hour   Intake --   Output 900 ml   Net -900 ml       Current Facility-Administered Medications   Medication Dose Route Frequency    cloNIDine HCL (CATAPRES) tablet 0.1 mg  0.1 mg Oral BID    labetaloL (NORMODYNE) tablet 300 mg  300 mg Oral TID    famotidine (PEPCID) tablet 20 mg  20 mg Oral DAILY    amLODIPine (NORVASC) tablet 10 mg  10 mg Oral DAILY    insulin lispro (HUMALOG) injection 0-10 Units  0-10 Units SubCUTAneous AC&HS    lip protectant (BLISTEX) ointment 1 Each  1 Each Topical PRN    alum-mag hydroxide-simeth (MYLANTA) oral suspension 30 mL  30 mL Oral Q4H PRN    heparin (porcine) injection 5,000 Units  5,000 Units SubCUTAneous Q8H    sodium chloride (NS) flush 5-40 mL  5-40 mL IntraVENous Q8H    sodium chloride (NS) flush 5-40 mL  5-40 mL IntraVENous PRN    acetaminophen (TYLENOL) tablet 650 mg  650 mg Oral Q6H PRN    Or    acetaminophen (TYLENOL) suppository 650 mg  650 mg Rectal Q6H PRN    polyethylene glycol (MIRALAX) packet 17 g  17 g Oral DAILY PRN    promethazine (PHENERGAN) tablet 12.5 mg  12.5 mg Oral Q6H PRN    cefTRIAXone (ROCEPHIN) 1 g in 0.9% sodium chloride (MBP/ADV) 50 mL MBP  1 g IntraVENous Q24H    hydrALAZINE (APRESOLINE) tablet 25 mg  25 mg Oral TID PRN       EXAM  GEN - Alert, oriented, in no distress  CV - S1, S2, RRR, no rub, murmur, or gallop  Lung - clear to auscultation bilaterally  Abd - soft, nontender, BS present  Ext - no edema  Access - right temp line- intact     Recent Labs     12/28/20  0426 12/27/20  0705 12/26/20  0550   WBC 8.1 9.3 9.7   HGB 9.7* 10.3* 10.8*   HCT 30.3* 32.1* 33.1*   * 490* 444        Recent Labs     12/28/20 0426 12/27/20  0705 12/26/20  0550    135* 132*   K 4.1 3.9 3.8   CL 99 99 92*   CO2 27 25 23   BUN 75* 72* 104*   CREA 13.70* 12.00* 14.60*   CA 8.6 8.4 8.7   * 158* 129*       Assessment and Plan:      JOSSELIN versus progression of CKD - No recent cr available for review other than one on oct 2019 with cr 0.9   serologies pending - will decide on need for biopsy pending serologies , most likely ESRD from Uncontrolled and untreated HTN   UA shows hematuria and proteinuria   Pyelonephritis seen on CT done in out pt setting   Hepatitis and HIV negative     US kidney chronic echogenicity seen   Seen on HD-#3, tolerating dialysis, no sign of renal recovery   Appreciate CW for oupt HD slot, will consult IR for TCC     Metabolic  Acidosis - improved with dialysis     Anemia- stable    HTN uncontrolled, diagnosed about 15 years ago with hx of non compliance with meds     Hyperphosphatemia- add Jayne Navarro NP

## 2020-12-28 NOTE — DIALYSIS
TRANSFER IN - DIALYSIS    Received patient in dialysis unit  from [facility] (unit) for ordered procedure. Consent verified for renal replacement therapy. Patient a/ox4 and vital signs stable. /87,  P85    Hemodialysis initiated using R temp IJ cath. Aspirated and flushed both ports without difficulty. Dressing clean, dry and intact. Machine settings per MD order. Will monitor during treatment.

## 2020-12-28 NOTE — PROGRESS NOTES
Hospitalist Note     Admit Date:  2020 10:57 PM   Name:  Wendy Ash   Age:  37 y.o.  :  1985   MRN:  [id number]   PCP:  Terry Soliz MD  Treatment Team: Attending Provider: Jackson Escobar MD; Consulting Provider: Krista Orellana NP; Utilization Review: Marc Jarrett RN; Tech: Gloria Lovett; Utilization Review: Kathleen Sierra; Charge Nurse: Blake Ruiz    HPI/Subjective:   Patient 35F with pmhx of HTN currently not on antihypertensive medications presents from Urgent care where she was seen for report of abdominal pain. She was found to be in acute renal failure and was started on HD .     : pt seen and examined at bedside. Denies any chest pain or pressure, SOB, lightheadedness, palpitations, near syncope or syncope. She is scheduled for dialysis today. Discussed plan of care to control BP with patient. Denies any LE edema or pain. No other complaints  Objective:     Patient Vitals for the past 24 hrs:   Temp Pulse Resp BP SpO2   20 1230 -- 86 -- (!) 138/102 --   20 1158 -- 85 -- (!) 141/87 --   20 1130 97.5 °F (36.4 °C) 97 19 133/77 97 %   20 0805 97.4 °F (36.3 °C) 94 19 (!) 155/93 98 %   20 0419 97.6 °F (36.4 °C) 91 19 (!) 145/94 95 %   20 2344 98 °F (36.7 °C) 87 19 139/89 97 %   20 2127 -- 92 -- (!) 152/100 --   20 2030 98.1 °F (36.7 °C) 87 19 (!) 148/95 98 %   20 1451 98.3 °F (36.8 °C) 96 19 (!) 145/98 99 %     Oxygen Therapy  O2 Sat (%): 97 % (20 1130)  Pulse via Oximetry: 103 beats per minute (20 1400)  O2 Device: Nasal cannula (12/24/20 1930)  O2 Flow Rate (L/min): 2 l/min (20)    Estimated body mass index is 47.48 kg/m² as calculated from the following:    Height as of this encounter: 5' 5\" (1.651 m). Weight as of this encounter: 129.4 kg (285 lb 4.8 oz).       Intake/Output Summary (Last 24 hours) at 2020 1256  Last data filed at 2020 1131  Gross per 24 hour Intake --   Output 900 ml   Net -900 ml       *Note that automatically entered I/Os may not be accurate; dependent on patient compliance with collection and accurate  by techs. General:    Well nourished. Alert. Obese  CV:   RRR. No murmur, rub, or gallop. Lungs:   CTAB. No wheezing, rhonchi, or rales. Abdomen:   Soft, nontender, nondistended. Extremities: Warm and dry. No cyanosis. 1+ edema BLE   Skin:     No rashes or jaundice. Neuro:  No gross focal deficits    Data Reviewed:  I have reviewed all labs, meds, and studies from the last 24 hours:  Recent Results (from the past 24 hour(s))   GLUCOSE, POC    Collection Time: 12/27/20  4:05 PM   Result Value Ref Range    Glucose (POC) 179 (H) 65 - 100 mg/dL   GLUCOSE, POC    Collection Time: 12/27/20  9:04 PM   Result Value Ref Range    Glucose (POC) 178 (H) 65 - 999 mg/dL   METABOLIC PANEL, BASIC    Collection Time: 12/28/20  4:26 AM   Result Value Ref Range    Sodium 136 136 - 145 mmol/L    Potassium 4.1 3.5 - 5.1 mmol/L    Chloride 99 98 - 107 mmol/L    CO2 27 21 - 32 mmol/L    Anion gap 10 7 - 16 mmol/L    Glucose 141 (H) 65 - 100 mg/dL    BUN 75 (H) 6 - 23 MG/DL    Creatinine 13.70 (H) 0.6 - 1.0 MG/DL    GFR est AA 4 (L) >60 ml/min/1.73m2    GFR est non-AA 3 (L) >60 ml/min/1.73m2    Calcium 8.6 8.3 - 10.4 MG/DL   CBC WITH AUTOMATED DIFF    Collection Time: 12/28/20  4:26 AM   Result Value Ref Range    WBC 8.1 4.3 - 11.1 K/uL    RBC 3.92 (L) 4.05 - 5.2 M/uL    HGB 9.7 (L) 11.7 - 15.4 g/dL    HCT 30.3 (L) 35.8 - 46.3 %    MCV 77.3 (L) 79.6 - 97.8 FL    MCH 24.7 (L) 26.1 - 32.9 PG    MCHC 32.0 31.4 - 35.0 g/dL    RDW 14.1 11.9 - 14.6 %    PLATELET 503 (H) 149 - 450 K/uL    MPV 9.5 9.4 - 12.3 FL    ABSOLUTE NRBC 0.00 0.0 - 0.2 K/uL    NEUTROPHILS 62 43 - 78 %    LYMPHOCYTES 15 13 - 44 %    MONOCYTES 11 4.0 - 12.0 %    EOSINOPHILS 2 0.5 - 7.8 %    BASOPHILS 1 0.0 - 2.0 %    IMMATURE GRANULOCYTES 9 (H) 0.0 - 5.0 %    ABS.  NEUTROPHILS 5.0 1.7 - 8.2 K/UL    ABS. LYMPHOCYTES 1.2 0.5 - 4.6 K/UL    ABS. MONOCYTES 0.9 0.1 - 1.3 K/UL    ABS. EOSINOPHILS 0.2 0.0 - 0.8 K/UL    ABS. BASOPHILS 0.1 0.0 - 0.2 K/UL    ABS. IMM. GRANS. 0.7 (H) 0.0 - 0.5 K/UL    DF AUTOMATED     GLUCOSE, POC    Collection Time: 12/28/20  8:03 AM   Result Value Ref Range    Glucose (POC) 176 (H) 65 - 100 mg/dL   GLUCOSE, POC    Collection Time: 12/28/20 11:26 AM   Result Value Ref Range    Glucose (POC) 177 (H) 65 - 100 mg/dL        Current Meds:  Current Facility-Administered Medications   Medication Dose Route Frequency    cloNIDine HCL (CATAPRES) tablet 0.1 mg  0.1 mg Oral BID    labetaloL (NORMODYNE) tablet 300 mg  300 mg Oral TID    famotidine (PEPCID) tablet 20 mg  20 mg Oral DAILY    amLODIPine (NORVASC) tablet 10 mg  10 mg Oral DAILY    insulin lispro (HUMALOG) injection 0-10 Units  0-10 Units SubCUTAneous AC&HS    lip protectant (BLISTEX) ointment 1 Each  1 Each Topical PRN    alum-mag hydroxide-simeth (MYLANTA) oral suspension 30 mL  30 mL Oral Q4H PRN    heparin (porcine) injection 5,000 Units  5,000 Units SubCUTAneous Q8H    sodium chloride (NS) flush 5-40 mL  5-40 mL IntraVENous Q8H    sodium chloride (NS) flush 5-40 mL  5-40 mL IntraVENous PRN    acetaminophen (TYLENOL) tablet 650 mg  650 mg Oral Q6H PRN    Or    acetaminophen (TYLENOL) suppository 650 mg  650 mg Rectal Q6H PRN    polyethylene glycol (MIRALAX) packet 17 g  17 g Oral DAILY PRN    promethazine (PHENERGAN) tablet 12.5 mg  12.5 mg Oral Q6H PRN    cefTRIAXone (ROCEPHIN) 1 g in 0.9% sodium chloride (MBP/ADV) 50 mL MBP  1 g IntraVENous Q24H    hydrALAZINE (APRESOLINE) tablet 25 mg  25 mg Oral TID PRN       Other Studies:  No results found for this visit on 12/23/20. No results found.     All Micro Results     Procedure Component Value Units Date/Time    CULTURE, URINE [[id number]] Collected: 12/24/20 0016    Order Status: Completed Specimen: Cath Urine Updated: 12/26/20 0719     Special Requests: NO SPECIAL REQUESTS        Culture result:       >100,000 COLONIES/mL MIXED SKIN JAE ISOLATED                  THREE OR MORE TYPES OF ORGANISMS ARE PRESENT. THIS IS INDICATIVE OF CONTAMINATION DUE TO IMPROPER COLLECTION TECHNIQUE. PLEASE REPEAT COLLECTION UNLESS PATIENT HAS STARTED ANTIBIOTIC TREATMENT.                 SARS-CoV-2 Lab Results  \"Novel Coronavirus\" Test: No results found for: COV2NT   \"Emergent Disease\" Test: No results found for: EDPR  \"SARS-COV-2\" Test: No results found for: XGCOVT  Rapid Test:   Lab Results   Component Value Date/Time    COVR Not detected 12/24/2020 12:56 AM            Assessment and Plan:     Hospital Problems as of 12/28/2020 Never Reviewed          Codes Class Noted - Resolved POA    Acute renal failure (ARF) ([facility] [address] ICD-10-CM: N17.9  ICD-9-CM: 584.9  12/24/2020 - Present Unknown        Elevated procalcitonin ICD-10-CM: R79.89  ICD-9-CM: 790.99  12/24/2020 - Present Unknown        Obesity ICD-10-CM: E66.9  ICD-9-CM: 278.00  12/24/2020 - Present Unknown        Hx of essential hypertension ICD-10-CM: Z86.79  ICD-9-CM: V12.59  12/24/2020 - Present Unknown        Microcytic anemia ICD-10-CM: D50.9  ICD-9-CM: 280.9  12/24/2020 - Present Unknown              Plan:  # Acute renal failure - with significant proteinuria w/o edema on exam.   - s/p bicarb ivf, now d/c'ed  - Strict I/O, collins in place  - Serologies ordered- DEVEN, complement levels, UPEP, SPEP, Hepatitis, ANCA  - urine culture with 100k orgasnism - Mixed skin jae, cont rocephin for treatment of possible pyelo   - Renal ultrasound without hydronephrosis or acute obstruction  - nephrology consulted, temporary dialysis catheter placed 12/24, underwent dialysis 12/24 with repeat HD 12/26 without improvement in RF  - repeat HD today   - may need to consider renal biopsy, defer to neprho      # Bibasilar infiltrates on CT from urgent care  - r/o Covid-19 (rapid negative), PCR negative  - likely pulm edema from poor oncotic pressure and ARF  - satting well on RA     #  HTN  - non compliant with OP meds  - s/p cardene  - uncontrolled on toprol and norvasc  - stop toprol, increase labetalol 300mg TID  - start clonidine 0.1mg BID   - cont norvasc    #GERD/indigestion  - pepcid BID  - maalox prn      # microcytic anemia  - iron wnl     DC planning/Dispo:  Pending improvement in renal function and determination for long term dialysis needs.        Diet:  DIET RENAL  DVT ppx:  Heparin subq    Signed:  Keli Brennan MD

## 2020-12-28 NOTE — PROGRESS NOTES
's initial visit to convey care and concern and explore spiritual needs. Ms. Onita Cowden was off the floor and I visited with her mother. Patient's mother inquired about receiving financial assistance with Ms. Hannon's medical bills. I relayed the request to unit secretary.      Danni Anderson 70  Board Certified

## 2020-12-28 NOTE — PROGRESS NOTES
Problem: Falls - Risk of  Goal: *Absence of Falls  Description: Document Cody Lawson Fall Risk and appropriate interventions in the flowsheet. Outcome: Progressing Towards Goal  Note: Fall Risk Interventions:            Medication Interventions: Bed/chair exit alarm, Evaluate medications/consider consulting pharmacy, Assess postural VS orthostatic hypotension    Elimination Interventions: Call light in reach, Bed/chair exit alarm, Elevated toilet seat              Problem: Patient Education: Go to Patient Education Activity  Goal: Patient/Family Education  Outcome: Progressing Towards Goal     Problem: Diabetes Self-Management  Goal: *Disease process and treatment process  Description: Define diabetes and identify own type of diabetes; list 3 options for treating diabetes. Outcome: Progressing Towards Goal  Goal: *Incorporating nutritional management into lifestyle  Description: Describe effect of type, amount and timing of food on blood glucose; list 3 methods for planning meals. Outcome: Progressing Towards Goal  Goal: *Incorporating physical activity into lifestyle  Description: State effect of exercise on blood glucose levels. Outcome: Progressing Towards Goal  Goal: *Developing strategies to promote health/change behavior  Description: Define the ABC's of diabetes; identify appropriate screenings, schedule and personal plan for screenings. Outcome: Progressing Towards Goal  Goal: *Using medications safely  Description: State effect of diabetes medications on diabetes; name diabetes medication taking, action and side effects. Outcome: Progressing Towards Goal  Goal: *Monitoring blood glucose, interpreting and using results  Description: Identify recommended blood glucose targets  and personal targets.   Outcome: Progressing Towards Goal  Goal: *Prevention, detection, treatment of acute complications  Description: List symptoms of hyper- and hypoglycemia; describe how to treat low blood sugar and actions for lowering  high blood glucose level. Outcome: Progressing Towards Goal  Goal: *Prevention, detection and treatment of chronic complications  Description: Define the natural course of diabetes and describe the relationship of blood glucose levels to long term complications of diabetes.   Outcome: Progressing Towards Goal  Goal: *Developing strategies to address psychosocial issues  Description: Describe feelings about living with diabetes; identify support needed and support network  Outcome: Progressing Towards Goal  Goal: *Insulin pump training  Outcome: Progressing Towards Goal  Goal: *Sick day guidelines  Outcome: Progressing Towards Goal  Goal: *Patient Specific Goal (EDIT GOAL, INSERT TEXT)  Outcome: Progressing Towards Goal     Problem: Patient Education: Go to Patient Education Activity  Goal: Patient/Family Education  Outcome: Progressing Towards Goal

## 2020-12-28 NOTE — PROGRESS NOTES
Chart screened by  for potential discharge needs or concerns. Pt currently receiving HD due to JOSSELIN. Unsure if HD will be needed long term. Please notify/consult  if any discharge needs arise.     Care Management Interventions  PCP Verified by CM: Yes(pt goes to [facility] or [facility]gent Care for needs)  Discharge Durable Medical Equipment: No  Physical Therapy Consult: No  Occupational Therapy Consult: No  Speech Therapy Consult: No  Current Support Network: Family Lives Nearby  Discharge Location  Discharge Placement: Unable to determine at this time

## 2020-12-28 NOTE — DIALYSIS
Off dialysis at 1530 and 1 Kg removed. RIJ Catheter flushed with 10 ml NS per protocol. Vital signs  HR=91, GL=066/96. Pt noted sleeping but arouses easily  and is oriented. Pt to room after treatment completed.

## 2020-12-29 ENCOUNTER — APPOINTMENT (OUTPATIENT)
Dept: CT IMAGING | Age: 35
DRG: 674 | End: 2020-12-29
Attending: NURSE PRACTITIONER
Payer: COMMERCIAL

## 2020-12-29 ENCOUNTER — APPOINTMENT (OUTPATIENT)
Dept: INTERVENTIONAL RADIOLOGY/VASCULAR | Age: 35
DRG: 674 | End: 2020-12-29
Attending: NURSE PRACTITIONER
Payer: COMMERCIAL

## 2020-12-29 LAB
ALBUMIN SERPL ELPH-MCNC: 1.91 G/DL (ref 3.2–5.6)
ALBUMIN SERPL-MCNC: 1.7 G/DL (ref 3.5–5)
ALBUMIN/GLOB SERPL: 0.5 {RATIO}
ALPHA1 GLOB SERPL ELPH-MCNC: 0.38 G/DL (ref 0.1–0.4)
ALPHA2 GLOB SERPL ELPH-MCNC: 1.22 G/DL (ref 0.4–1.2)
ANION GAP SERPL CALC-SCNC: 9 MMOL/L (ref 7–16)
B-GLOBULIN SERPL QL ELPH: 0.97 G/DL (ref 0.6–1.3)
BASOPHILS # BLD: 0 K/UL (ref 0–0.2)
BASOPHILS NFR BLD: 0 % (ref 0–2)
BUN SERPL-MCNC: 44 MG/DL (ref 6–23)
CALCIUM SERPL-MCNC: 8.9 MG/DL (ref 8.3–10.4)
CHLORIDE SERPL-SCNC: 99 MMOL/L (ref 98–107)
CO2 SERPL-SCNC: 28 MMOL/L (ref 21–32)
CREAT SERPL-MCNC: 10 MG/DL (ref 0.6–1)
DIFFERENTIAL METHOD BLD: ABNORMAL
EOSINOPHIL # BLD: 0.2 K/UL (ref 0–0.8)
EOSINOPHIL NFR BLD: 2 % (ref 0.5–7.8)
ERYTHROCYTE [DISTWIDTH] IN BLOOD BY AUTOMATED COUNT: 14.3 % (ref 11.9–14.6)
GAMMA GLOB MFR SERPL ELPH: 1.51 G/DL (ref 0.5–1.6)
GLUCOSE BLD STRIP.AUTO-MCNC: 137 MG/DL (ref 65–100)
GLUCOSE BLD STRIP.AUTO-MCNC: 138 MG/DL (ref 65–100)
GLUCOSE BLD STRIP.AUTO-MCNC: 163 MG/DL (ref 65–100)
GLUCOSE SERPL-MCNC: 122 MG/DL (ref 65–100)
HCT VFR BLD AUTO: 30.7 % (ref 35.8–46.3)
HGB BLD-MCNC: 9.7 G/DL (ref 11.7–15.4)
IGA SERPL-MCNC: 302 MG/DL (ref 85–499)
IGG SERPL-MCNC: 1327 MG/DL (ref 610–1616)
IGM SERPL-MCNC: 103 MG/DL (ref 35–242)
IMM GRANULOCYTES # BLD AUTO: 0.7 K/UL (ref 0–0.5)
IMM GRANULOCYTES NFR BLD AUTO: 7 % (ref 0–5)
LYMPHOCYTES # BLD: 1.2 K/UL (ref 0.5–4.6)
LYMPHOCYTES NFR BLD: 13 % (ref 13–44)
M PROTEIN SERPL ELPH-MCNC: ABNORMAL G/DL
MCH RBC QN AUTO: 24.9 PG (ref 26.1–32.9)
MCHC RBC AUTO-ENTMCNC: 31.6 G/DL (ref 31.4–35)
MCV RBC AUTO: 78.7 FL (ref 79.6–97.8)
MONOCYTES # BLD: 0.8 K/UL (ref 0.1–1.3)
MONOCYTES NFR BLD: 9 % (ref 4–12)
NEUTS SEG # BLD: 6.4 K/UL (ref 1.7–8.2)
NEUTS SEG NFR BLD: 69 % (ref 43–78)
NRBC # BLD: 0 K/UL (ref 0–0.2)
PHOSPHATE SERPL-MCNC: 6.8 MG/DL (ref 2.5–4.5)
PLATELET # BLD AUTO: 550 K/UL (ref 150–450)
PMV BLD AUTO: 9.5 FL (ref 9.4–12.3)
POTASSIUM SERPL-SCNC: 4.1 MMOL/L (ref 3.5–5.1)
PROT PATTERN SERPL ELPH-IMP: ABNORMAL
PROT PATTERN SPEC IFE-IMP: ABNORMAL
PROT SERPL-MCNC: 6 G/DL (ref 6.3–8.2)
RBC # BLD AUTO: 3.9 M/UL (ref 4.05–5.2)
SODIUM SERPL-SCNC: 136 MMOL/L (ref 136–145)
WBC # BLD AUTO: 9.3 K/UL (ref 4.3–11.1)

## 2020-12-29 PROCEDURE — 50200 RENAL BIOPSY PERQ: CPT

## 2020-12-29 PROCEDURE — 0TB13ZX EXCISION OF LEFT KIDNEY, PERCUTANEOUS APPROACH, DIAGNOSTIC: ICD-10-PCS | Performed by: RADIOLOGY

## 2020-12-29 PROCEDURE — 74011250637 HC RX REV CODE- 250/637: Performed by: STUDENT IN AN ORGANIZED HEALTH CARE EDUCATION/TRAINING PROGRAM

## 2020-12-29 PROCEDURE — 80069 RENAL FUNCTION PANEL: CPT

## 2020-12-29 PROCEDURE — 82962 GLUCOSE BLOOD TEST: CPT

## 2020-12-29 PROCEDURE — 74011000258 HC RX REV CODE- 258: Performed by: INTERNAL MEDICINE

## 2020-12-29 PROCEDURE — 74011250636 HC RX REV CODE- 250/636: Performed by: STUDENT IN AN ORGANIZED HEALTH CARE EDUCATION/TRAINING PROGRAM

## 2020-12-29 PROCEDURE — 74011000250 HC RX REV CODE- 250: Performed by: RADIOLOGY

## 2020-12-29 PROCEDURE — 88305 TISSUE EXAM BY PATHOLOGIST: CPT

## 2020-12-29 PROCEDURE — 77030027478 HC TBNG JP W BLB MRTM -B

## 2020-12-29 PROCEDURE — 77030004566 HC CATH ANGI DX TORCON COOK -B

## 2020-12-29 PROCEDURE — 74011636637 HC RX REV CODE- 636/637: Performed by: STUDENT IN AN ORGANIZED HEALTH CARE EDUCATION/TRAINING PROGRAM

## 2020-12-29 PROCEDURE — 74011250636 HC RX REV CODE- 250/636: Performed by: INTERNAL MEDICINE

## 2020-12-29 PROCEDURE — 99152 MOD SED SAME PHYS/QHP 5/>YRS: CPT

## 2020-12-29 PROCEDURE — 84166 PROTEIN E-PHORESIS/URINE/CSF: CPT

## 2020-12-29 PROCEDURE — C1729 CATH, DRAINAGE: HCPCS

## 2020-12-29 PROCEDURE — 85025 COMPLETE CBC W/AUTO DIFF WBC: CPT

## 2020-12-29 PROCEDURE — 77030014006 HC SPNG HEMSTAT J&J -A

## 2020-12-29 PROCEDURE — 74011250637 HC RX REV CODE- 250/637: Performed by: NURSE PRACTITIONER

## 2020-12-29 PROCEDURE — C1769 GUIDE WIRE: HCPCS

## 2020-12-29 PROCEDURE — 74011250636 HC RX REV CODE- 250/636: Performed by: RADIOLOGY

## 2020-12-29 PROCEDURE — 65660000000 HC RM CCU STEPDOWN

## 2020-12-29 RX ORDER — LABETALOL HYDROCHLORIDE 5 MG/ML
20 INJECTION, SOLUTION INTRAVENOUS
Status: DISCONTINUED | OUTPATIENT
Start: 2020-12-29 | End: 2021-01-11 | Stop reason: HOSPADM

## 2020-12-29 RX ORDER — FENTANYL CITRATE 50 UG/ML
12.5-1 INJECTION, SOLUTION INTRAMUSCULAR; INTRAVENOUS
Status: DISCONTINUED | OUTPATIENT
Start: 2020-12-29 | End: 2021-01-04 | Stop reason: HOSPADM

## 2020-12-29 RX ORDER — DIPHENHYDRAMINE HYDROCHLORIDE 50 MG/ML
50 INJECTION, SOLUTION INTRAMUSCULAR; INTRAVENOUS
Status: DISCONTINUED | OUTPATIENT
Start: 2020-12-29 | End: 2021-01-11 | Stop reason: HOSPADM

## 2020-12-29 RX ORDER — HEPARIN SODIUM 1000 [USP'U]/ML
1000-10000 INJECTION, SOLUTION INTRAVENOUS; SUBCUTANEOUS ONCE
Status: DISCONTINUED | OUTPATIENT
Start: 2020-12-29 | End: 2020-12-29

## 2020-12-29 RX ORDER — DIPHENHYDRAMINE HYDROCHLORIDE 50 MG/ML
12.5-5 INJECTION, SOLUTION INTRAMUSCULAR; INTRAVENOUS ONCE
Status: COMPLETED | OUTPATIENT
Start: 2020-12-29 | End: 2020-12-29

## 2020-12-29 RX ORDER — LIDOCAINE HYDROCHLORIDE AND EPINEPHRINE 20; 10 MG/ML; UG/ML
20-200 INJECTION, SOLUTION INFILTRATION; PERINEURAL ONCE
Status: DISCONTINUED | OUTPATIENT
Start: 2020-12-29 | End: 2020-12-29

## 2020-12-29 RX ORDER — SODIUM CHLORIDE 9 MG/ML
25 INJECTION, SOLUTION INTRAVENOUS CONTINUOUS
Status: DISCONTINUED | OUTPATIENT
Start: 2020-12-29 | End: 2020-12-31

## 2020-12-29 RX ORDER — LIDOCAINE HYDROCHLORIDE 20 MG/ML
40-120 INJECTION, SOLUTION INFILTRATION; PERINEURAL ONCE
Status: COMPLETED | OUTPATIENT
Start: 2020-12-29 | End: 2020-12-29

## 2020-12-29 RX ORDER — HYDROCODONE BITARTRATE AND ACETAMINOPHEN 10; 325 MG/1; MG/1
1 TABLET ORAL
Status: DISCONTINUED | OUTPATIENT
Start: 2020-12-29 | End: 2021-01-11 | Stop reason: HOSPADM

## 2020-12-29 RX ORDER — MIDAZOLAM HYDROCHLORIDE 1 MG/ML
.25-2 INJECTION, SOLUTION INTRAMUSCULAR; INTRAVENOUS
Status: DISCONTINUED | OUTPATIENT
Start: 2020-12-29 | End: 2021-01-11

## 2020-12-29 RX ORDER — CLONIDINE HYDROCHLORIDE 0.1 MG/1
0.2 TABLET ORAL 2 TIMES DAILY
Status: DISCONTINUED | OUTPATIENT
Start: 2020-12-30 | End: 2021-01-11 | Stop reason: HOSPADM

## 2020-12-29 RX ADMIN — CEFTRIAXONE SODIUM 1 G: 1 INJECTION, POWDER, FOR SOLUTION INTRAMUSCULAR; INTRAVENOUS at 04:27

## 2020-12-29 RX ADMIN — CLONIDINE HYDROCHLORIDE 0.1 MG: 0.1 TABLET ORAL at 09:00

## 2020-12-29 RX ADMIN — HEPARIN SODIUM 5000 UNITS: 5000 INJECTION INTRAVENOUS; SUBCUTANEOUS at 14:11

## 2020-12-29 RX ADMIN — FAMOTIDINE 20 MG: 20 TABLET, FILM COATED ORAL at 09:00

## 2020-12-29 RX ADMIN — AMLODIPINE BESYLATE 10 MG: 10 TABLET ORAL at 09:00

## 2020-12-29 RX ADMIN — Medication 10 ML: at 05:21

## 2020-12-29 RX ADMIN — LABETALOL HYDROCHLORIDE 300 MG: 200 TABLET, FILM COATED ORAL at 17:10

## 2020-12-29 RX ADMIN — FENTANYL CITRATE 25 MCG: 50 INJECTION, SOLUTION INTRAMUSCULAR; INTRAVENOUS at 10:34

## 2020-12-29 RX ADMIN — INSULIN LISPRO 2 UNITS: 100 INJECTION, SOLUTION INTRAVENOUS; SUBCUTANEOUS at 17:10

## 2020-12-29 RX ADMIN — MIDAZOLAM 0.5 MG: 1 INJECTION INTRAMUSCULAR; INTRAVENOUS at 10:34

## 2020-12-29 RX ADMIN — HEPARIN SODIUM 5000 UNITS: 5000 INJECTION INTRAVENOUS; SUBCUTANEOUS at 21:52

## 2020-12-29 RX ADMIN — CLONIDINE HYDROCHLORIDE 0.1 MG: 0.1 TABLET ORAL at 17:11

## 2020-12-29 RX ADMIN — FENTANYL CITRATE 25 MCG: 50 INJECTION, SOLUTION INTRAMUSCULAR; INTRAVENOUS at 10:37

## 2020-12-29 RX ADMIN — MIDAZOLAM 0.5 MG: 1 INJECTION INTRAMUSCULAR; INTRAVENOUS at 10:37

## 2020-12-29 RX ADMIN — LABETALOL HYDROCHLORIDE 300 MG: 200 TABLET, FILM COATED ORAL at 09:00

## 2020-12-29 RX ADMIN — DIPHENHYDRAMINE HYDROCHLORIDE 25 MG: 50 INJECTION, SOLUTION INTRAMUSCULAR; INTRAVENOUS at 10:46

## 2020-12-29 RX ADMIN — LIDOCAINE HYDROCHLORIDE 4 ML: 20 INJECTION, SOLUTION INFILTRATION; PERINEURAL at 10:43

## 2020-12-29 RX ADMIN — Medication 10 ML: at 21:52

## 2020-12-29 RX ADMIN — Medication 10 ML: at 14:12

## 2020-12-29 RX ADMIN — SEVELAMER CARBONATE 800 MG: 800 TABLET, FILM COATED ORAL at 17:11

## 2020-12-29 RX ADMIN — LABETALOL HYDROCHLORIDE 300 MG: 200 TABLET, FILM COATED ORAL at 21:52

## 2020-12-29 NOTE — PROGRESS NOTES
SW met with pt/mother at the [facility]. Per hospital protocol, CM wore appropriate PPE and observed social distancing. No direct physical contact. They inquired about applying for disability and [employer] financial assistance. SW instructed them to first contact the pt's employer to determine if they provide STD benefits but if the pt becomes permanently disabled she would need to apply for SSD through the Social Security administration. SW provided them with a financial assistance application and handout about the financial assistance process. They verbalized understanding of all information provided. SW remains available to assist as needed.

## 2020-12-29 NOTE — PROCEDURES
Department of Interventional Radiology  [phone]        Interventional Radiology Brief Procedure Note    Patient: Jaki Whitney MRN: [id number]  SSN: [id number]    YOB: 1985  Age: 37 y.o. Sex: female      Date of Procedure: 12/29/2020    Pre-Procedure Diagnosis: Acute Renal Failure. Post-Procedure Diagnosis: SAME    Procedure(s): Image Guided Biopsy    Brief Description of Procedure: Lower Pole, Left Kidney, core biopsy. Performed By: Rashida Vidal MD     Assistants: None    Anesthesia:Moderate Sedation    Estimated Blood Loss: Less than 10ml    Specimens:  Pathology    Implants:  None    Findings: Unremarkable. Complications: None    Recommendations: 3 hour bedrest.  NPO x 3 hour. Monitor BP. Follow Up: Tunnelled HD Catheter placement tomorrow.      Signed By: Rashida Vidal MD     December 29, 2020

## 2020-12-29 NOTE — PROGRESS NOTES
TRANSFER - OUT REPORT:    Verbal report given to Primary RN on Ramu Simon  being transferred to  for routine progression of care       Report consisted of patients Situation, Background, Assessment and Recommendations(SBAR). Information from the following report(s) SBAR, Procedure Summary and MAR was reviewed with the receiving nurse. Opportunity for questions and clarification was provided.       Primary RN instructed to make pt NPO after midnight as well as hold all blood thinners after midnight for procedure tomorrow

## 2020-12-29 NOTE — PROGRESS NOTES
Hourly rounds performed through shift, pt denies needs at this time. Bed in low position and call light/ personal items within reach. Will continue to monitor and report to day shift nurse. END OF SHIFT NOTE:    INTAKE/OUTPUT  12/28 0701 - 12/29 0700  In: -   Out: 2350 [Urine:1350]  Voiding: YES  Catheter: Yes  Color: clear  Drain:              DIET  NPO    Flatus: Patient does have flatus present. Stool:  1 occurrences. Characteristics:  Stool Assessment  Stool Appearance: Loose    Ambulating  Yes    Emesis: 0 occurrences.     Characteristics:          VITAL SIGNS  Patient Vitals for the past 12 hrs:   Temp Pulse Resp BP SpO2   12/29/20 0400 98.2 °F (36.8 °C) (!) 59 20 (!) 156/93 97 %   12/28/20 2320 98.1 °F (36.7 °C) 78 18 119/83 91 %   12/28/20 2051 97.9 °F (36.6 °C) (!) 58 18 (!) 165/78 97 %       Pain Assessment  Pain Intensity 1: 0 (12/28/20 2051)  Pain Location 1: Abdomen  Pain Intervention(s) 1: Medication (see MAR)  Patient Stated Pain Goal: 0            Edwena Orn

## 2020-12-29 NOTE — PROGRESS NOTES
Problem: Falls - Risk of  Goal: *Absence of Falls  Description: Document Damián Salazar Fall Risk and appropriate interventions in the flowsheet. Outcome: Progressing Towards Goal  Note: Fall Risk Interventions:            Medication Interventions: Evaluate medications/consider consulting pharmacy, Patient to call before getting OOB    Elimination Interventions: Call light in reach, Patient to call for help with toileting needs, Stay With Me (per policy), Toilet paper/wipes in reach              Problem: Patient Education: Go to Patient Education Activity  Goal: Patient/Family Education  Outcome: Progressing Towards Goal     Problem: Diabetes Self-Management  Goal: *Disease process and treatment process  Description: Define diabetes and identify own type of diabetes; list 3 options for treating diabetes. Outcome: Progressing Towards Goal  Goal: *Incorporating nutritional management into lifestyle  Description: Describe effect of type, amount and timing of food on blood glucose; list 3 methods for planning meals. Outcome: Progressing Towards Goal  Goal: *Incorporating physical activity into lifestyle  Description: State effect of exercise on blood glucose levels. Outcome: Progressing Towards Goal  Goal: *Developing strategies to promote health/change behavior  Description: Define the ABC's of diabetes; identify appropriate screenings, schedule and personal plan for screenings. Outcome: Progressing Towards Goal  Goal: *Using medications safely  Description: State effect of diabetes medications on diabetes; name diabetes medication taking, action and side effects. Outcome: Progressing Towards Goal  Goal: *Monitoring blood glucose, interpreting and using results  Description: Identify recommended blood glucose targets  and personal targets.   Outcome: Progressing Towards Goal  Goal: *Prevention, detection, treatment of acute complications  Description: List symptoms of hyper- and hypoglycemia; describe how to treat low blood sugar and actions for lowering  high blood glucose level. Outcome: Progressing Towards Goal  Goal: *Prevention, detection and treatment of chronic complications  Description: Define the natural course of diabetes and describe the relationship of blood glucose levels to long term complications of diabetes.   Outcome: Progressing Towards Goal  Goal: *Developing strategies to address psychosocial issues  Description: Describe feelings about living with diabetes; identify support needed and support network  Outcome: Progressing Towards Goal  Goal: *Insulin pump training  Outcome: Progressing Towards Goal  Goal: *Sick day guidelines  Outcome: Progressing Towards Goal  Goal: *Patient Specific Goal (EDIT GOAL, INSERT TEXT)  Outcome: Progressing Towards Goal     Problem: Patient Education: Go to Patient Education Activity  Goal: Patient/Family Education  Outcome: Progressing Towards Goal

## 2020-12-29 NOTE — PROGRESS NOTES
Hospitalist Note     Admit Date:  2020 10:57 PM   Name:  Breezy Ybarra   Age:  37 y.o.  :  1985   MRN:  [id number]   PCP:  Maximiliano Calderon MD  Treatment Team: Attending Provider: Hola Stokes MD; Consulting Provider: Castro Kelly NP; Utilization Review: Adelia Elise RN; Tech: Dion Rosado; Utilization Review: Alondra Krause; Care Manager: Brenna Mckay LMSW; Medical Assistant: Harshal Lora RN; Charge Nurse: Dung Salvador    HPI/Subjective:   Patient 35F with pmhx of HTN currently not on antihypertensive medications presents from Urgent care where she was seen for report of abdominal pain. She was found to be in acute renal failure and was started on HD .     : pt seen and examined at bedside after renal biopsy this AM. Denies any pain in her back, chest pain or pressure, SOB, lightheadedness, palpitations, near syncope or syncope. She is going for dialysis tomorrow, with plan for outpatient HD. Discussed plan of care to control BP and diabetes with patient. Denies any LE edema or pain.      No other complaints  Objective:     Patient Vitals for the past 24 hrs:   Temp Pulse Resp BP SpO2   20 1611 98 °F (36.7 °C) 92 17 (!) 143/96 96 %   20 1133 -- 84 16 131/83 95 %   20 1123 -- 83 16 131/78 94 %   20 1113 -- 85 16 (!) 146/87 94 %   20 1104 -- 87 16 (!) 145/80 92 %   20 1054 -- 86 12 (!) 142/85 99 %   20 1051 -- 87 14 (!) 141/91 98 %   20 1046 -- 86 14 (!) 160/88 99 %   20 1041 -- 86 14 (!) 147/82 99 %   20 1036 -- 86 14 (!) 145/87 98 %   20 1031 -- 86 16 135/86 98 %   20 0941 97.7 °F (36.5 °C) 87 16 (!) 158/97 94 %   20 0727 98.1 °F (36.7 °C) 97 20 (!) 151/97 99 %   20 0400 98.2 °F (36.8 °C) (!) 59 20 (!) 156/93 97 %   20 2320 98.1 °F (36.7 °C) 78 18 119/83 91 %   20 2051 97.9 °F (36.6 °C) (!) 58 18 (!) 165/78 97 %     Oxygen Therapy  O2 Sat (%): 96 % (12/29/20 1611)  Pulse via Oximetry: 84 beats per minute (12/29/20 1133)  O2 Device: Nasal cannula (12/29/20 1133)  O2 Flow Rate (L/min): 3 l/min (12/29/20 1133)  ETCO2 (mmHg): 42 mmHg (12/29/20 1054)    Estimated body mass index is 39.44 kg/m² as calculated from the following:    Height as of this encounter: 5' 5\" (1.651 m). Weight as of this encounter: 107.5 kg (237 lb). Intake/Output Summary (Last 24 hours) at 12/29/2020 1718  Last data filed at 12/29/2020 0620  Gross per 24 hour   Intake --   Output 450 ml   Net -450 ml       *Note that automatically entered I/Os may not be accurate; dependent on patient compliance with collection and accurate  by techs. General:    Well nourished. Alert. Obese  CV:   RRR. No murmur, rub, or gallop. Lungs:   CTAB. No wheezing, rhonchi, or rales. Abdomen:   Soft, nontender, nondistended. Extremities: Warm and dry. No cyanosis. 1+ edema BLE   Skin:     No rashes or jaundice.    Neuro:  No gross focal deficits    Data Reviewed:  I have reviewed all labs, meds, and studies from the last 24 hours:  Recent Results (from the past 24 hour(s))   GLUCOSE, POC    Collection Time: 12/28/20  9:47 PM   Result Value Ref Range    Glucose (POC) 163 (H) 65 - 100 mg/dL   RENAL FUNCTION PANEL    Collection Time: 12/29/20  6:52 AM   Result Value Ref Range    Sodium 136 136 - 145 mmol/L    Potassium 4.1 3.5 - 5.1 mmol/L    Chloride 99 98 - 107 mmol/L    CO2 28 21 - 32 mmol/L    Anion gap 9 7 - 16 mmol/L    Glucose 122 (H) 65 - 100 mg/dL    BUN 44 (H) 6 - 23 MG/DL    Creatinine 10.00 (H) 0.6 - 1.0 MG/DL    GFR est AA 6 (L) >60 ml/min/1.73m2    GFR est non-AA 5 (L) >60 ml/min/1.73m2    Calcium 8.9 8.3 - 10.4 MG/DL    Phosphorus 6.8 (H) 2.5 - 4.5 MG/DL    Albumin 1.7 (L) 3.5 - 5.0 g/dL   CBC WITH AUTOMATED DIFF    Collection Time: 12/29/20  6:53 AM   Result Value Ref Range    WBC 9.3 4.3 - 11.1 K/uL    RBC 3.90 (L) 4.05 - 5.2 M/uL    HGB 9.7 (L) 11.7 - 15.4 g/dL    HCT 30.7 (L) 35.8 - 46.3 %    MCV 78.7 (L) 79.6 - 97.8 FL    MCH 24.9 (L) 26.1 - 32.9 PG    MCHC 31.6 31.4 - 35.0 g/dL    RDW 14.3 11.9 - 14.6 %    PLATELET 613 (H) 514 - 450 K/uL    MPV 9.5 9.4 - 12.3 FL    ABSOLUTE NRBC 0.00 0.0 - 0.2 K/uL    NEUTROPHILS 69 43 - 78 %    LYMPHOCYTES 13 13 - 44 %    MONOCYTES 9 4.0 - 12.0 %    EOSINOPHILS 2 0.5 - 7.8 %    BASOPHILS 0 0.0 - 2.0 %    IMMATURE GRANULOCYTES 7 (H) 0.0 - 5.0 %    ABS. NEUTROPHILS 6.4 1.7 - 8.2 K/UL    ABS. LYMPHOCYTES 1.2 0.5 - 4.6 K/UL    ABS. MONOCYTES 0.8 0.1 - 1.3 K/UL    ABS. EOSINOPHILS 0.2 0.0 - 0.8 K/UL    ABS. BASOPHILS 0.0 0.0 - 0.2 K/UL    ABS. IMM.  GRANS. 0.7 (H) 0.0 - 0.5 K/UL    DF AUTOMATED     GLUCOSE, POC    Collection Time: 12/29/20  7:24 AM   Result Value Ref Range    Glucose (POC) 138 (H) 65 - 100 mg/dL   GLUCOSE, POC    Collection Time: 12/29/20  4:33 PM   Result Value Ref Range    Glucose (POC) 163 (H) 65 - 100 mg/dL        Current Meds:  Current Facility-Administered Medications   Medication Dose Route Frequency    0.9% sodium chloride infusion  25 mL/hr IntraVENous CONTINUOUS    fentaNYL citrate (PF) injection 12.5-100 mcg  12.5-100 mcg IntraVENous Multiple    midazolam (VERSED) injection 0.25-2 mg  0.25-2 mg IntraVENous Multiple    diphenhydrAMINE (BENADRYL) injection 50 mg  50 mg IntraVENous Multiple    cloNIDine HCL (CATAPRES) tablet 0.1 mg  0.1 mg Oral BID    sevelamer carbonate (RENVELA) tab 800 mg  800 mg Oral TID WITH MEALS    labetaloL (NORMODYNE) tablet 300 mg  300 mg Oral TID    famotidine (PEPCID) tablet 20 mg  20 mg Oral DAILY    amLODIPine (NORVASC) tablet 10 mg  10 mg Oral DAILY    insulin lispro (HUMALOG) injection 0-10 Units  0-10 Units SubCUTAneous AC&HS    lip protectant (BLISTEX) ointment 1 Each  1 Each Topical PRN    alum-mag hydroxide-simeth (MYLANTA) oral suspension 30 mL  30 mL Oral Q4H PRN    heparin (porcine) injection 5,000 Units  5,000 Units SubCUTAneous Q8H    sodium chloride (NS) flush 5-40 mL 5-40 mL IntraVENous Q8H    sodium chloride (NS) flush 5-40 mL  5-40 mL IntraVENous PRN    acetaminophen (TYLENOL) tablet 650 mg  650 mg Oral Q6H PRN    Or    acetaminophen (TYLENOL) suppository 650 mg  650 mg Rectal Q6H PRN    polyethylene glycol (MIRALAX) packet 17 g  17 g Oral DAILY PRN    promethazine (PHENERGAN) tablet 12.5 mg  12.5 mg Oral Q6H PRN    cefTRIAXone (ROCEPHIN) 1 g in 0.9% sodium chloride (MBP/ADV) 50 mL MBP  1 g IntraVENous Q24H    hydrALAZINE (APRESOLINE) tablet 25 mg  25 mg Oral TID PRN       Other Studies:  No results found for this visit on 12/23/20. Ct Bx Renal Rt    Result Date: 12/29/2020  PROCEDURE: CT-guided left kidney core biopsy. Procedural Personnel Attending physician(s): Severo Vera M.D. Pre-procedure diagnosis: Pleasant 44-year-old woman with acute renal failure. Hypertension. Diabetes mellitus. Post-procedure diagnosis: Same Indication: Organ dysfunction Previous biopsy of same target (QCDR): No Additional clinical history: None Complications: No immediate complications. IMPRESSION:  CT-guided Core biopsy of inferior pole, left kidney. Plan:  Specimen(s) sent to the Pathology department for evaluation. 3 hours bedrest with close blood pressure monitoring. _______________________________________________________________ Technique: All CT scans at this facility are performed using dose reduction/dose modulation techniques, as appropriate to the performed exam, including the following:  Automated Exposure Control; Adjustment of the MA and/or kF according to patient size (this includes techniques or standardized protocols for targeted exams where dose is matched to indication/reason for exam); and Use of Iterative Reconstruction Technique.  PROCEDURE SUMMARY: - Percutaneous CT-guided Coaxial Core Needle Biopsy - Additional procedure(s): Gelfoam administration PROCEDURE DETAILS: Pre-procedure Reference imaging for biopsy target: None Consent:  Informed written and oral consent for the procedure was obtained after explanation of risks (including, but not limitted to:  hemorrhage, infection, visceral injury, nondiagnostic biopsy) benefits and alternatives. The patient's questions were answered to their satisfaction. They stated understanding and requested that we proceed. Final verification:  A time-out identifying the patient and planned procedure was performed prior to this procedure. Preparation: (MIPS) Maximal sterile barrier technique (including:  cap, mask, sterile gown, sterile gloves, sterile sheet, hand hygiene, and cutaneous antisepsis) was used. Anesthesia/sedation Level of anesthesia/sedation: Moderate sedation (conscious sedation) Anesthesia/sedation administered by: Independent trained observer under attending supervision with continuous monitoring of the patient?s level of consciousness and physiologic status Total intra-service sedation time (minutes): 23 Imaging prior to biopsy The patient was positioned prone. Initial imaging was performed using noncontrast CT. Biopsy target: - Maximal diameter (cm): 3.2 - Location: Inferior pole, left kidney Other findings: None Biopsy Local anesthesia was administered. Under CT guidance, the coaxial biopsy system was advanced to the target and Core biopsy was performed. Coaxial needle: 17 gauge Core needle biopsy device: Tufin Core needle size: 18 gauge Number of core specimens: 5 Fine needle aspiration device: Not applicable Fine needle size: Not applicable Number of FNA specimens: Not applicable On-site biopsy touch preparation: No  Additional sampling recommendations: Not applicable Preliminary assessment of sample adequacy: Not applicable Needle removal The biopsy needle was removed and a sterile dressing was applied. Tract embolization: Gelfoam slurry Imaging following biopsy Immediate post-biopsy imaging was performed. Imaging modality: noncontrast CT.  Post-biopsy imaging findings: No evidence of hemorrhage. Gas from the Gelfoam administration follows the needle path. Contrast Contrast agent: None Contrast volume (mL): 0 Radiation Dose CT dose length product (mGy-cm):  1010.72 Additional Details Additional description of procedure: None Equipment details: None Specimens removed: Pathology Estimated blood loss (mL): Less than 10 Standardized report: [email or web address] Attestation Signer name: Aung Velazquez M.D. I attest that I personally performed the entire procedure. I reviewed the stored images and agree with the report as written. All Micro Results     Procedure Component Value Units Date/Time    CULTURE, URINE [[id number]] Collected: 12/24/20 0016    Order Status: Completed Specimen: Cath Urine Updated: 12/26/20 0719     Special Requests: NO SPECIAL REQUESTS        Culture result:       >100,000 COLONIES/mL MIXED SKIN AZEEM ISOLATED                  THREE OR MORE TYPES OF ORGANISMS ARE PRESENT. THIS IS INDICATIVE OF CONTAMINATION DUE TO IMPROPER COLLECTION TECHNIQUE. PLEASE REPEAT COLLECTION UNLESS PATIENT HAS STARTED ANTIBIOTIC TREATMENT.                 SARS-CoV-2 Lab Results  \"Novel Coronavirus\" Test: No results found for: COV2NT   \"Emergent Disease\" Test: No results found for: EDPR  \"SARS-COV-2\" Test: No results found for: XGCOVT  Rapid Test:   Lab Results   Component Value Date/Time    COVR Not detected 12/24/2020 12:56 AM            Assessment and Plan:     Hospital Problems as of 12/29/2020 Never Reviewed          Codes Class Noted - Resolved POA    Acute renal failure (ARF) ([facility] [address] ICD-10-CM: N17.9  ICD-9-CM: 584.9  12/24/2020 - Present Unknown        Elevated procalcitonin ICD-10-CM: R79.89  ICD-9-CM: 790.99  12/24/2020 - Present Unknown        Obesity ICD-10-CM: E66.9  ICD-9-CM: 278.00  12/24/2020 - Present Unknown        Hx of essential hypertension ICD-10-CM: Z86.79  ICD-9-CM: V12.59  12/24/2020 - Present Unknown        Microcytic anemia ICD-10-CM: D50.9  ICD-9-CM: 280.9  12/24/2020 - Present Unknown              Plan:  # Acute renal failure - with significant proteinuria w/o edema on exam.   - Strict I/O, collins in place  - Serologies ordered- DEVEN, complement levels, UPEP, SPEP, Hepatitis, ANCA  - urine culture with 100k orgasnism - Mixed skin jae, cont rocephin for treatment of possible pyelo   - Renal ultrasound without hydronephrosis or acute obstruction  - nephrology consulted, temporary dialysis catheter placed 12/24, underwent dialysis 12/24 with repeat HD 12/26, 12/28 without improvement in RF  - s/p renal biopsy with path pending  - CW for outpatient HD, TCC placement tomorrow     # Bibasilar infiltrates on CT from urgent care  - r/o Covid-19 (rapid negative), PCR negative  - likely pulm edema from poor oncotic pressure and ARF  - satting well on RA     #  HTN  - non compliant with OP meds  - s/p cardene  - uncontrolled on toprol and norvasc  - stop toprol, increased labetalol 300mg TID  - increase clonidine 0.2mg BID   - cont norvasc    #GERD/indigestion  - pepcid BID  - maalox prn      # microcytic anemia  - iron wnl     DC planning/Dispo:  Pending improvement in renal function and determination for long term dialysis needs. Case mngmt to assist with dialysis chair.        Diet:  DIET REGULAR  DIET NPO  DIET NPO  DVT ppx:  Heparin subq    Signed:  Sergio Zamarripa MD

## 2020-12-29 NOTE — PROGRESS NOTES
[facility] PROGRESS NOTE    Follow up for: JOSSELIN     Subjective:   Patient seen and examined on rounds, no issues over night, discussed risks with renal biopsy.   Labs and chart reviewed- no sign of renal recovery     ROS:  Gen - no fever, no chills, appetite okay  CV - no chest pain, no orthopnea  Lung - no shortness of breath, no cough  Abd - no tenderness, no nausea, no vomiting  Ext - no edema    Objective:   Exam:  Vitals:    12/28/20 2320 12/29/20 0400 12/29/20 0622 12/29/20 0727   BP: 119/83 (!) 156/93  (!) 151/97   Pulse: 78 (!) 59  97   Resp: 18 20  20   Temp: 98.1 °F (36.7 °C) 98.2 °F (36.8 °C)  98.1 °F (36.7 °C)   SpO2: 91% 97%  99%   Weight:   107.8 kg (237 lb 9.6 oz)    Height:             Intake/Output Summary (Last 24 hours) at 12/29/2020 0902  Last data filed at 12/29/2020 5211  Gross per 24 hour   Intake --   Output 2350 ml   Net -2350 ml       Current Facility-Administered Medications   Medication Dose Route Frequency    cloNIDine HCL (CATAPRES) tablet 0.1 mg  0.1 mg Oral BID    sevelamer carbonate (RENVELA) tab 800 mg  800 mg Oral TID WITH MEALS    labetaloL (NORMODYNE) tablet 300 mg  300 mg Oral TID    famotidine (PEPCID) tablet 20 mg  20 mg Oral DAILY    amLODIPine (NORVASC) tablet 10 mg  10 mg Oral DAILY    insulin lispro (HUMALOG) injection 0-10 Units  0-10 Units SubCUTAneous AC&HS    lip protectant (BLISTEX) ointment 1 Each  1 Each Topical PRN    alum-mag hydroxide-simeth (MYLANTA) oral suspension 30 mL  30 mL Oral Q4H PRN    heparin (porcine) injection 5,000 Units  5,000 Units SubCUTAneous Q8H    sodium chloride (NS) flush 5-40 mL  5-40 mL IntraVENous Q8H    sodium chloride (NS) flush 5-40 mL  5-40 mL IntraVENous PRN    acetaminophen (TYLENOL) tablet 650 mg  650 mg Oral Q6H PRN    Or    acetaminophen (TYLENOL) suppository 650 mg  650 mg Rectal Q6H PRN    polyethylene glycol (MIRALAX) packet 17 g  17 g Oral DAILY PRN    promethazine (PHENERGAN) tablet 12.5 mg  12.5 mg Oral Q6H PRN    cefTRIAXone (ROCEPHIN) 1 g in 0.9% sodium chloride (MBP/ADV) 50 mL MBP  1 g IntraVENous Q24H    hydrALAZINE (APRESOLINE) tablet 25 mg  25 mg Oral TID PRN       EXAM  GEN - Alert, oriented, in no distress  CV - S1, S2, Regular rate, no Rub   Lung - clear to auscultation bilaterally  Abd - soft, nontender, BS present  Ext - no edema  Access - right temp line- intact     Recent Labs     12/29/20  0653 12/28/20  0426 12/27/20  0705   WBC 9.3 8.1 9.3   HGB 9.7* 9.7* 10.3*   HCT 30.7* 30.3* 32.1*   * 494* 490*        Recent Labs     12/29/20  0652 12/28/20  0426 12/27/20  0705    136 135*   K 4.1 4.1 3.9   CL 99 99 99   CO2 28 27 25   BUN 44* 75* 72*   CREA 10.00* 13.70* 12.00*   CA 8.9 8.6 8.4   * 141* 158*   PHOS 6.8*  --   --        Assessment and Plan:      JOSSELIN versus progression of CKD - No recent cr available for review other than one on oct 2019 with cr 0.9   serologies pending - will decide on need for biopsy pending serologies , most likely ESRD from Uncontrolled and untreated HTN and diabetes   UA shows hematuria and proteinuria   Pyelonephritis seen on CT done in out pt setting   Hepatitis and HIV negative     US kidney chronic echogenicity seen   1st dialysis 12/24  Appreciate CW for oupt HD slot, consult IR for TCC   Plan for renal biopsy reviewed risks with pt   Next HD tomorrow for volume and clearance     Metabolic  Acidosis - improved with dialysis     Anemia- stable    HTN uncontrolled, diagnosed about 15 years ago with hx of non compliance with meds     Hyperphosphatemia- on renvela     DM type II- per hosp A1C 10.2, pt unaware of diabetes      Shelby Mariee NP

## 2020-12-29 NOTE — PROGRESS NOTES
Patient dialyzed yesterday and will dialyze again tomorrow. RIJV Temp HD catheter is functional.  Discussed options w patient. We will proceed w kidney biopsy today and schedule Temp-to-Tunnel HD conversion for tomorrow or Thursday.     Brandon Herzog MD

## 2020-12-30 ENCOUNTER — APPOINTMENT (OUTPATIENT)
Dept: INTERVENTIONAL RADIOLOGY/VASCULAR | Age: 35
DRG: 674 | End: 2020-12-30
Attending: NURSE PRACTITIONER
Payer: COMMERCIAL

## 2020-12-30 LAB
ALBUMIN SERPL-MCNC: 1.9 G/DL (ref 3.5–5)
ANION GAP SERPL CALC-SCNC: 8 MMOL/L (ref 7–16)
BASOPHILS # BLD: 0 K/UL (ref 0–0.2)
BASOPHILS NFR BLD: 0 % (ref 0–2)
BUN SERPL-MCNC: 53 MG/DL (ref 6–23)
CALCIUM SERPL-MCNC: 8.7 MG/DL (ref 8.3–10.4)
CHLORIDE SERPL-SCNC: 100 MMOL/L (ref 98–107)
CO2 SERPL-SCNC: 28 MMOL/L (ref 21–32)
CREAT SERPL-MCNC: 12.2 MG/DL (ref 0.6–1)
CREAT UR-MCNC: 81.5 MG/DL
DIFFERENTIAL METHOD BLD: ABNORMAL
EOSINOPHIL # BLD: 0.2 K/UL (ref 0–0.8)
EOSINOPHIL NFR BLD: 2 % (ref 0.5–7.8)
ERYTHROCYTE [DISTWIDTH] IN BLOOD BY AUTOMATED COUNT: 14.4 % (ref 11.9–14.6)
GLUCOSE BLD STRIP.AUTO-MCNC: 106 MG/DL (ref 65–100)
GLUCOSE BLD STRIP.AUTO-MCNC: 130 MG/DL (ref 65–100)
GLUCOSE BLD STRIP.AUTO-MCNC: 147 MG/DL (ref 65–100)
GLUCOSE BLD STRIP.AUTO-MCNC: 158 MG/DL (ref 65–100)
GLUCOSE SERPL-MCNC: 128 MG/DL (ref 65–100)
HCT VFR BLD AUTO: 30.1 % (ref 35.8–46.3)
HGB BLD-MCNC: 9.5 G/DL (ref 11.7–15.4)
IMM GRANULOCYTES # BLD AUTO: 0.4 K/UL (ref 0–0.5)
IMM GRANULOCYTES NFR BLD AUTO: 4 % (ref 0–5)
LYMPHOCYTES # BLD: 1.3 K/UL (ref 0.5–4.6)
LYMPHOCYTES NFR BLD: 12 % (ref 13–44)
MCH RBC QN AUTO: 25.4 PG (ref 26.1–32.9)
MCHC RBC AUTO-ENTMCNC: 31.6 G/DL (ref 31.4–35)
MCV RBC AUTO: 80.5 FL (ref 79.6–97.8)
MONOCYTES # BLD: 1.1 K/UL (ref 0.1–1.3)
MONOCYTES NFR BLD: 10 % (ref 4–12)
NEUTS SEG # BLD: 7.8 K/UL (ref 1.7–8.2)
NEUTS SEG NFR BLD: 72 % (ref 43–78)
NRBC # BLD: 0 K/UL (ref 0–0.2)
PHOSPHATE SERPL-MCNC: 7.9 MG/DL (ref 2.5–4.5)
PLATELET # BLD AUTO: 535 K/UL (ref 150–450)
PMV BLD AUTO: 9.5 FL (ref 9.4–12.3)
POTASSIUM SERPL-SCNC: 4.4 MMOL/L (ref 3.5–5.1)
PROT UR-MCNC: 2164 MG/DL
PROT/CREAT UR-RTO: 26.6
RBC # BLD AUTO: 3.74 M/UL (ref 4.05–5.2)
SODIUM SERPL-SCNC: 136 MMOL/L (ref 136–145)
WBC # BLD AUTO: 10.9 K/UL (ref 4.3–11.1)

## 2020-12-30 PROCEDURE — 99152 MOD SED SAME PHYS/QHP 5/>YRS: CPT

## 2020-12-30 PROCEDURE — 85025 COMPLETE CBC W/AUTO DIFF WBC: CPT

## 2020-12-30 PROCEDURE — 74011000250 HC RX REV CODE- 250: Performed by: RADIOLOGY

## 2020-12-30 PROCEDURE — 74011250637 HC RX REV CODE- 250/637: Performed by: NURSE PRACTITIONER

## 2020-12-30 PROCEDURE — 02H633Z INSERTION OF INFUSION DEVICE INTO RIGHT ATRIUM, PERCUTANEOUS APPROACH: ICD-10-PCS | Performed by: PHYSICIAN ASSISTANT

## 2020-12-30 PROCEDURE — 77030002916 HC SUT ETHLN J&J -A

## 2020-12-30 PROCEDURE — 74011250636 HC RX REV CODE- 250/636: Performed by: RADIOLOGY

## 2020-12-30 PROCEDURE — 82962 GLUCOSE BLOOD TEST: CPT

## 2020-12-30 PROCEDURE — 77030018719 HC DRSG PTCH ANTIMIC J&J -A

## 2020-12-30 PROCEDURE — 74011250637 HC RX REV CODE- 250/637: Performed by: STUDENT IN AN ORGANIZED HEALTH CARE EDUCATION/TRAINING PROGRAM

## 2020-12-30 PROCEDURE — C1769 GUIDE WIRE: HCPCS

## 2020-12-30 PROCEDURE — C1750 CATH, HEMODIALYSIS,LONG-TERM: HCPCS

## 2020-12-30 PROCEDURE — 65660000000 HC RM CCU STEPDOWN

## 2020-12-30 PROCEDURE — 74011250637 HC RX REV CODE- 250/637: Performed by: RADIOLOGY

## 2020-12-30 PROCEDURE — 74011250636 HC RX REV CODE- 250/636: Performed by: STUDENT IN AN ORGANIZED HEALTH CARE EDUCATION/TRAINING PROGRAM

## 2020-12-30 PROCEDURE — 74011000258 HC RX REV CODE- 258: Performed by: INTERNAL MEDICINE

## 2020-12-30 PROCEDURE — 02PAX3Z REMOVAL OF INFUSION DEVICE FROM HEART, EXTERNAL APPROACH: ICD-10-PCS | Performed by: PHYSICIAN ASSISTANT

## 2020-12-30 PROCEDURE — 0JH63XZ INSERTION OF TUNNELED VASCULAR ACCESS DEVICE INTO CHEST SUBCUTANEOUS TISSUE AND FASCIA, PERCUTANEOUS APPROACH: ICD-10-PCS | Performed by: PHYSICIAN ASSISTANT

## 2020-12-30 PROCEDURE — 90935 HEMODIALYSIS ONE EVALUATION: CPT

## 2020-12-30 PROCEDURE — 74011250636 HC RX REV CODE- 250/636: Performed by: INTERNAL MEDICINE

## 2020-12-30 PROCEDURE — 80069 RENAL FUNCTION PANEL: CPT

## 2020-12-30 RX ORDER — LIDOCAINE HYDROCHLORIDE 20 MG/ML
2-10 INJECTION, SOLUTION INFILTRATION; PERINEURAL ONCE
Status: ACTIVE | OUTPATIENT
Start: 2020-12-30 | End: 2020-12-30

## 2020-12-30 RX ORDER — LIDOCAINE HYDROCHLORIDE AND EPINEPHRINE 10; 10 MG/ML; UG/ML
2-100 INJECTION, SOLUTION INFILTRATION; PERINEURAL ONCE
Status: COMPLETED | OUTPATIENT
Start: 2020-12-30 | End: 2020-12-30

## 2020-12-30 RX ORDER — HEPARIN SODIUM 1000 [USP'U]/ML
5000 INJECTION, SOLUTION INTRAVENOUS; SUBCUTANEOUS ONCE
Status: COMPLETED | OUTPATIENT
Start: 2020-12-30 | End: 2020-12-30

## 2020-12-30 RX ORDER — MIDAZOLAM HYDROCHLORIDE 1 MG/ML
.5-2 INJECTION, SOLUTION INTRAMUSCULAR; INTRAVENOUS
Status: DISCONTINUED | OUTPATIENT
Start: 2020-12-30 | End: 2021-01-11

## 2020-12-30 RX ORDER — FENTANYL CITRATE 50 UG/ML
25-50 INJECTION, SOLUTION INTRAMUSCULAR; INTRAVENOUS
Status: DISCONTINUED | OUTPATIENT
Start: 2020-12-30 | End: 2021-01-04 | Stop reason: HOSPADM

## 2020-12-30 RX ADMIN — CLONIDINE HYDROCHLORIDE 0.2 MG: 0.1 TABLET ORAL at 09:13

## 2020-12-30 RX ADMIN — MIDAZOLAM 0.5 MG: 1 INJECTION INTRAMUSCULAR; INTRAVENOUS at 08:32

## 2020-12-30 RX ADMIN — SEVELAMER CARBONATE 800 MG: 800 TABLET, FILM COATED ORAL at 09:13

## 2020-12-30 RX ADMIN — CLONIDINE HYDROCHLORIDE 0.2 MG: 0.1 TABLET ORAL at 21:27

## 2020-12-30 RX ADMIN — Medication 10 ML: at 21:28

## 2020-12-30 RX ADMIN — LABETALOL HYDROCHLORIDE 300 MG: 200 TABLET, FILM COATED ORAL at 16:19

## 2020-12-30 RX ADMIN — HYDROCODONE BITARTRATE AND ACETAMINOPHEN 1 TABLET: 10; 325 TABLET ORAL at 22:18

## 2020-12-30 RX ADMIN — MIDAZOLAM 0.5 MG: 1 INJECTION INTRAMUSCULAR; INTRAVENOUS at 08:17

## 2020-12-30 RX ADMIN — FENTANYL CITRATE 25 MCG: 50 INJECTION, SOLUTION INTRAMUSCULAR; INTRAVENOUS at 08:20

## 2020-12-30 RX ADMIN — Medication 10 ML: at 04:30

## 2020-12-30 RX ADMIN — FENTANYL CITRATE 25 MCG: 50 INJECTION, SOLUTION INTRAMUSCULAR; INTRAVENOUS at 08:17

## 2020-12-30 RX ADMIN — HYDROCODONE BITARTRATE AND ACETAMINOPHEN 1 TABLET: 10; 325 TABLET ORAL at 16:19

## 2020-12-30 RX ADMIN — LIDOCAINE HYDROCHLORIDE,EPINEPHRINE BITARTRATE 400 MG: 10; .01 INJECTION, SOLUTION INFILTRATION; PERINEURAL at 08:20

## 2020-12-30 RX ADMIN — LABETALOL HYDROCHLORIDE 300 MG: 200 TABLET, FILM COATED ORAL at 09:13

## 2020-12-30 RX ADMIN — AMLODIPINE BESYLATE 10 MG: 10 TABLET ORAL at 09:13

## 2020-12-30 RX ADMIN — HEPARIN SODIUM 3800 UNITS: 1000 INJECTION, SOLUTION INTRAVENOUS; SUBCUTANEOUS at 08:35

## 2020-12-30 RX ADMIN — SEVELAMER CARBONATE 800 MG: 800 TABLET, FILM COATED ORAL at 16:19

## 2020-12-30 RX ADMIN — CEFTRIAXONE SODIUM 1 G: 1 INJECTION, POWDER, FOR SOLUTION INTRAMUSCULAR; INTRAVENOUS at 04:30

## 2020-12-30 RX ADMIN — FENTANYL CITRATE 25 MCG: 50 INJECTION, SOLUTION INTRAMUSCULAR; INTRAVENOUS at 08:32

## 2020-12-30 RX ADMIN — FENTANYL CITRATE 25 MCG: 50 INJECTION, SOLUTION INTRAMUSCULAR; INTRAVENOUS at 08:30

## 2020-12-30 RX ADMIN — MIDAZOLAM 0.5 MG: 1 INJECTION INTRAMUSCULAR; INTRAVENOUS at 08:30

## 2020-12-30 RX ADMIN — HEPARIN SODIUM 5000 UNITS: 5000 INJECTION INTRAVENOUS; SUBCUTANEOUS at 21:28

## 2020-12-30 RX ADMIN — LABETALOL HYDROCHLORIDE 300 MG: 200 TABLET, FILM COATED ORAL at 21:27

## 2020-12-30 RX ADMIN — FAMOTIDINE 20 MG: 20 TABLET, FILM COATED ORAL at 09:13

## 2020-12-30 RX ADMIN — MIDAZOLAM 0.5 MG: 1 INJECTION INTRAMUSCULAR; INTRAVENOUS at 08:20

## 2020-12-30 NOTE — DISCHARGE INSTRUCTIONS
Discharge Nutrition Plan: Recommend an appointment with nutrition therapy in outpatient setting. Contact the [facility] Nutrition Counseling Appointment line at [phone] for more information, or have your doctor send referral to [facility]. Fax #: [phone].

## 2020-12-30 NOTE — DIALYSIS
TRANSFER OUT- DIALYSIS    Hemodialysis treatment completed without complications. Patient alert and oriented and VS stable  /85  P 84       1.5 Kgs removed. Flushed both ports with 10 mL of NS.  CVC dressing clean, dry, and intact, tego caps intact, bilateral lumens wrapped with 4x4 gauze. Patient to room after dialysis.

## 2020-12-30 NOTE — PROGRESS NOTES
[facility] PROGRESS NOTE    Follow up for: JOSSELIN     Subjective:   Patient seen and examined on HD, dialyzing via right  Qb, UF 2100, complaints of TCC soreness, placed today by IR.    Labs and chart reviewed- no sign of renal recovery     ROS:  Gen - no fever, no chills, appetite okay  CV - no chest pain, no orthopnea  Lung - no shortness of breath, no cough  Abd - no tenderness, no nausea, no vomiting  Ext - no edema    Objective:   Exam:  Vitals:    12/30/20 0840 12/30/20 1143 12/30/20 1200 12/30/20 1230   BP: (!) 170/84 138/84 (!) 128/92 (!) 142/83   Pulse: 84 80 79 79   Resp: 16      Temp:       SpO2: 100%      Weight:       Height:           No intake or output data in the 24 hours ending 12/30/20 1258    Current Facility-Administered Medications   Medication Dose Route Frequency    lidocaine (XYLOCAINE) 20 mg/mL (2 %) injection  mg  2-10 mL IntraDERMal ONCE    midazolam (VERSED) injection 0.5-2 mg  0.5-2 mg IntraVENous Multiple    fentaNYL citrate (PF) injection 25-50 mcg  25-50 mcg IntraVENous Multiple    0.9% sodium chloride infusion  25 mL/hr IntraVENous CONTINUOUS    fentaNYL citrate (PF) injection 12.5-100 mcg  12.5-100 mcg IntraVENous Multiple    midazolam (VERSED) injection 0.25-2 mg  0.25-2 mg IntraVENous Multiple    diphenhydrAMINE (BENADRYL) injection 50 mg  50 mg IntraVENous Multiple    HYDROcodone-acetaminophen (NORCO)  mg tablet 1 Tab  1 Tab Oral Q6H PRN    labetaloL (NORMODYNE;TRANDATE) injection 20 mg  20 mg IntraVENous Q1H PRN    cloNIDine HCL (CATAPRES) tablet 0.2 mg  0.2 mg Oral BID    sevelamer carbonate (RENVELA) tab 800 mg  800 mg Oral TID WITH MEALS    labetaloL (NORMODYNE) tablet 300 mg  300 mg Oral TID    famotidine (PEPCID) tablet 20 mg  20 mg Oral DAILY    amLODIPine (NORVASC) tablet 10 mg  10 mg Oral DAILY    insulin lispro (HUMALOG) injection 0-10 Units  0-10 Units SubCUTAneous AC&HS    lip protectant (BLISTEX) ointment 1 Each  1 Each Topical PRN    alum-mag hydroxide-simeth (MYLANTA) oral suspension 30 mL  30 mL Oral Q4H PRN    heparin (porcine) injection 5,000 Units  5,000 Units SubCUTAneous Q8H    sodium chloride (NS) flush 5-40 mL  5-40 mL IntraVENous Q8H    sodium chloride (NS) flush 5-40 mL  5-40 mL IntraVENous PRN    acetaminophen (TYLENOL) tablet 650 mg  650 mg Oral Q6H PRN    Or    acetaminophen (TYLENOL) suppository 650 mg  650 mg Rectal Q6H PRN    polyethylene glycol (MIRALAX) packet 17 g  17 g Oral DAILY PRN    promethazine (PHENERGAN) tablet 12.5 mg  12.5 mg Oral Q6H PRN    cefTRIAXone (ROCEPHIN) 1 g in 0.9% sodium chloride (MBP/ADV) 50 mL MBP  1 g IntraVENous Q24H    hydrALAZINE (APRESOLINE) tablet 25 mg  25 mg Oral TID PRN       EXAM  GEN - Alert, oriented, in no distress  CV - S1, S2, Regular rate, no Rub   Lung - clear to auscultation bilaterally  Abd - soft, nontender, BS present  Ext - no edema  Access - right temp line- intact     Recent Labs     12/30/20  0429 12/29/20  0653 12/28/20  0426   WBC 10.9 9.3 8.1   HGB 9.5* 9.7* 9.7*   HCT 30.1* 30.7* 30.3*   * 550* 494*        Recent Labs     12/30/20  0426 12/29/20  0652 12/28/20  0426    136 136   K 4.4 4.1 4.1    99 99   CO2 28 28 27   BUN 53* 44* 75*   CREA 12.20* 10.00* 13.70*   CA 8.7 8.9 8.6   * 122* 141*   PHOS 7.9* 6.8*  --        Assessment and Plan:      JOSSELIN versus progression of CKD - No recent cr available for review other than one on oct 2019 with cr 0.9   serologies pending - will decide on need for biopsy pending serologies , most likely ESRD from Uncontrolled and untreated HTN and diabetes   UA shows hematuria and proteinuria   Pyelonephritis seen on CT done in out pt setting   Hepatitis and HIV negative   US kidney chronic echogenicity seen     1st dialysis 12/24  Appreciate CW for oupt HD slot, consult IR for TCC   S/p renal biopsy 12/29, TCC placed 12/30  -seen on HD, tolerating dialysis, likely ESRD, obtain BUE vein mapping for vascular access     Metabolic  Acidosis - improved with dialysis     Anemia-  Fe stats ok, add alie     HTN uncontrolled, diagnosed about 15 years ago with hx of non compliance with meds     Hyperphosphatemia- on renvela     DM type II- per hosp A1C 10.2, pt unaware of diabetes      Michaelle Parikh, NP

## 2020-12-30 NOTE — CONSULTS
Comprehensive Nutrition Assessment    Type and Reason for Visit: Initial, Patient education  Diet education (\"new to dialysis and new DM\" \"diabetic renal\" )    Nutrition Recommendations/Plan:   Add CCHO diet restriction to current renal diet.  Provided patient's mother with written materials on [facility] and renal diet. Provided brief verbal education. Malnutrition Assessment:  Malnutrition Status: Insufficient data    Nutrition Assessment:   Nutrition History: Per pt's mother at bedside, pt has had poor PO intake for ~2 weeks PTA. Nutrition Background: Pt admitted with decreased UOP, abdominal pain, n/v/d. Newly diagnosed DM and new to HD. H/O HTN. Daily Update: Attempted to see pt x 3 - off the floor each time. S/P tunneled HD catheter this AM and now at HD. Pt's mother waiting in pt's room at my time of visit. Pt's mother was receptive to a brief overview of information provided. Nutrition Related Findings:   NFPE deferred as pt is off the floor.        Current Nutrition Therapies:  DIET RENAL Regular    Current Intake:   Average Meal Intake: Unable to assess Average Supplement Intake: None ordered      Anthropometric Measures:  Height: 5' 5\" (165.1 cm)  Current Body Wt: 112 kg (246 lb 14.6 oz)(12/30), Weight source: Not specified  BMI: 41.1, Obese class 3 (BMI 40.0 or greater)  Admission Body Weight: 274 lb 14.6 oz(12/23, source not specified)  Ideal Body Wt: 125 lbs (57 kg), 197.5 %    Estimated Daily Nutrient Needs:  Energy (kcal/day): 9012-7330 (Kcal/kg(25-30), Weight Used: Ideal(56.8 kg))  Protein (g/day): 68-74(1.2-1.3) Weight Used: (Ideal(56.8 kg))  Fluid (ml/day):   ( )    Nutrition Diagnosis:   · Food & nutrition-related knowledge deficit related to (new medical diagnosis) as evidenced by (newly diagnosed with DM and new to HD, no prior education.)    Nutrition Interventions:   Food and/or Nutrient Delivery: Modify current diet(Add CCHO diet restriction to current renal diet.)  Nutrition Education/Counseling: (Education materials provided.)  Coordination of Nutrition Care: Continue to monitor while inpatient    Goals: Active Goal: Be able to provide brief overview of CCHO and renal diet guidelines at time of next visit.     Nutrition Monitoring and Evaluation:   Behavioral-Environmental Outcomes: Knowledge or skill, Readiness for change  Food/Nutrient Intake Outcomes: Food and nutrient intake  Physical Signs/Symptoms Outcomes: Biochemical data, Hemodynamic status    Discharge Planning:    Recommend pursue outpatient nutrition counseling, Recommend pursue outpatient diabetes education    Loan MS, RD, LD, [address]    Disaster Mode active

## 2020-12-30 NOTE — DIALYSIS
TRANSFER IN - DIALYSIS    Received patient in dialysis unit  from [facility] (unit) for ordered procedure. Consent verified for renal replacement therapy. Patient a/ox4 and vital signs stable. /84,  P80    Hemodialysis initiated using R perm cath placed today in IR. Aspirated and flushed both ports without difficulty. Dressing clean, dry and intact. Machine settings per MD order. Will monitor during treatment.

## 2020-12-30 NOTE — PROCEDURES
Department of Interventional Radiology  [phone]        Interventional Radiology Brief Procedure Note    Patient: Sam Romero MRN: [id number]  SSN: [id number]    YOB: 1985  Age: 37 y.o.   Sex: female      Date of Procedure: 12/30/2020    Pre-Procedure Diagnosis: ARF    Post-Procedure Diagnosis: SAME    Procedure(s): Tunneled Central Venous Catheter    Brief Description of Procedure: Exchange temporary to tunneled hemodialysis catheter    Performed By: Sara Rushing PA-C     Assistants: None    Anesthesia:Moderate Sedation per Mykel Banerjee MD    Estimated Blood Loss: Less than 10ml    Specimens:  None    Implants:  Tunnelled Hemodialysis Catheter    Findings: catheter tip in right atrium     Complications: None    Recommendations: ok to use catheter     Follow Up: prn    Signed By: Sara Rushing PA-C     December 30, 2020

## 2020-12-30 NOTE — DIABETES MGMT
Attempted to see pt for diabetes education. Pt is off the floor to dialysis. Plan is to start diabetes education when pt is available and able to participate. Diabetes educational packet and blood glucose meter left at bedside.

## 2020-12-30 NOTE — PROGRESS NOTES
Problem: Falls - Risk of  Goal: *Absence of Falls  Description: Document [place] Fall Risk and appropriate interventions in the flowsheet.   Note: Fall Risk Interventions:    Medication Interventions: Evaluate medications/consider consulting pharmacy, Teach patient to arise slowly    Elimination Interventions: Call light in reach    Problem: Acute Renal Failure: Day 6  Goal: Activity/Safety  Outcome: Resolved/Met     Problem: Acute Renal Failure: Day 6  Goal: Nutrition/Diet  Outcome: Resolved/Met     Problem: Acute Renal Failure: Day 6  Goal: Discharge Planning  Outcome: Resolved/Met     Problem: Acute Renal Failure: Day 6  Goal: Medications  Outcome: Resolved/Met

## 2020-12-30 NOTE — PROGRESS NOTES
Hospitalist Note     Admit Date:  2020 10:57 PM   Name:  Kailey Perkins   Age:  37 y.o.  :  1985   MRN:  [id number]   PCP:  Oscar Victoria MD  Treatment Team: Attending Provider: Everette Verdugo MD; Consulting Provider: Padmini Smalls NP; Utilization Review: Robert Ott RN; Tech: Garry Harrington; Utilization Review: Adan Perkins; Care Manager: Andree Fraga LMSW; Medical Assistant: Genevieve Wilson RN; Charge Nurse: Maria Teresa Byers    HPI/Subjective:   Patient 35F with pmhx of HTN currently not on antihypertensive medications presents from Urgent care where she was seen for report of abdominal pain. She was found to be in acute renal failure and was started on HD .     : pt seen and examined at bedside after TCC placed today. Denies any pain around catheter site, chest pain or pressure, SOB, lightheadedness, palpitations, near syncope or syncope. She is going for dialysis later today, with plan for outpatient HD. Discussed plan of care to control BP and diabetes with patient. Denies any LE edema or pain.      No other complaints  Objective:     Patient Vitals for the past 24 hrs:   Temp Pulse Resp BP SpO2   20 0840 -- 84 16 (!) 170/84 100 %   20 0835 -- 86 16 (!) 169/84 100 %   20 0830 -- 84 16 (!) 171/88 100 %   20 0825 -- 83 16 (!) 176/86 99 %   20 0820 -- 87 16 (!) 173/98 98 %   20 0815 -- 88 18 (!) 169/96 100 %   20 0748 97.6 °F (36.4 °C) 90 16 (!) 157/96 94 %   20 0708 98.4 °F (36.9 °C) 87 20 (!) 164/98 100 %   20 0359 98 °F (36.7 °C) 86 18 (!) 145/86 97 %   20 2312 97.7 °F (36.5 °C) 84 18 (!) 150/87 95 %   20 1920 98.6 °F (37 °C) 88 18 (!) 146/77 98 %   20 1611 98 °F (36.7 °C) 92 17 (!) 143/96 96 %     Oxygen Therapy  O2 Sat (%): 100 % (20 0840)  Pulse via Oximetry: 84 beats per minute (20 1133)  O2 Device: Nasal cannula (20 0840)  O2 Flow Rate (L/min): 3 l/min (20 0840)  ETCO2 (mmHg): 42 mmHg (12/29/20 1054)    Estimated body mass index is 41.1 kg/m² as calculated from the following:    Height as of this encounter: 5' 5\" (1.651 m). Weight as of this encounter: 112 kg (247 lb). No intake or output data in the 24 hours ending 12/30/20 1153    *Note that automatically entered I/Os may not be accurate; dependent on patient compliance with collection and accurate  by techs. General:    Well nourished. Alert. Obese  CV:   RRR. No murmur, rub, or gallop. Lungs:   CTAB. No wheezing, rhonchi, or rales. Abdomen:   Soft, nontender, nondistended. Extremities: Warm and dry. No cyanosis. Trace edema BLE   Skin:     No rashes or jaundice.    Neuro:  No gross focal deficits    Data Reviewed:  I have reviewed all labs, meds, and studies from the last 24 hours:  Recent Results (from the past 24 hour(s))   GLUCOSE, POC    Collection Time: 12/29/20  4:33 PM   Result Value Ref Range    Glucose (POC) 163 (H) 65 - 100 mg/dL   GLUCOSE, POC    Collection Time: 12/29/20  8:42 PM   Result Value Ref Range    Glucose (POC) 137 (H) 65 - 100 mg/dL   RENAL FUNCTION PANEL    Collection Time: 12/30/20  4:26 AM   Result Value Ref Range    Sodium 136 136 - 145 mmol/L    Potassium 4.4 3.5 - 5.1 mmol/L    Chloride 100 98 - 107 mmol/L    CO2 28 21 - 32 mmol/L    Anion gap 8 7 - 16 mmol/L    Glucose 128 (H) 65 - 100 mg/dL    BUN 53 (H) 6 - 23 MG/DL    Creatinine 12.20 (H) 0.6 - 1.0 MG/DL    GFR est AA 5 (L) >60 ml/min/1.73m2    GFR est non-AA 4 (L) >60 ml/min/1.73m2    Calcium 8.7 8.3 - 10.4 MG/DL    Phosphorus 7.9 (H) 2.5 - 4.5 MG/DL    Albumin 1.9 (L) 3.5 - 5.0 g/dL   CBC WITH AUTOMATED DIFF    Collection Time: 12/30/20  4:29 AM   Result Value Ref Range    WBC 10.9 4.3 - 11.1 K/uL    RBC 3.74 (L) 4.05 - 5.2 M/uL    HGB 9.5 (L) 11.7 - 15.4 g/dL    HCT 30.1 (L) 35.8 - 46.3 %    MCV 80.5 79.6 - 97.8 FL    MCH 25.4 (L) 26.1 - 32.9 PG    MCHC 31.6 31.4 - 35.0 g/dL    RDW 14.4 11.9 - 14.6 % PLATELET 910 (H) 839 - 450 K/uL    MPV 9.5 9.4 - 12.3 FL    ABSOLUTE NRBC 0.00 0.0 - 0.2 K/uL    DF AUTOMATED      NEUTROPHILS 72 43 - 78 %    LYMPHOCYTES 12 (L) 13 - 44 %    MONOCYTES 10 4.0 - 12.0 %    EOSINOPHILS 2 0.5 - 7.8 %    BASOPHILS 0 0.0 - 2.0 %    IMMATURE GRANULOCYTES 4 0.0 - 5.0 %    ABS. NEUTROPHILS 7.8 1.7 - 8.2 K/UL    ABS. LYMPHOCYTES 1.3 0.5 - 4.6 K/UL    ABS. MONOCYTES 1.1 0.1 - 1.3 K/UL    ABS. EOSINOPHILS 0.2 0.0 - 0.8 K/UL    ABS. BASOPHILS 0.0 0.0 - 0.2 K/UL    ABS. IMM. GRANS.  0.4 0.0 - 0.5 K/UL   GLUCOSE, POC    Collection Time: 12/30/20  7:13 AM   Result Value Ref Range    Glucose (POC) 130 (H) 65 - 100 mg/dL   GLUCOSE, POC    Collection Time: 12/30/20 11:07 AM   Result Value Ref Range    Glucose (POC) 158 (H) 65 - 100 mg/dL        Current Meds:  Current Facility-Administered Medications   Medication Dose Route Frequency    lidocaine (XYLOCAINE) 20 mg/mL (2 %) injection  mg  2-10 mL IntraDERMal ONCE    midazolam (VERSED) injection 0.5-2 mg  0.5-2 mg IntraVENous Multiple    fentaNYL citrate (PF) injection 25-50 mcg  25-50 mcg IntraVENous Multiple    0.9% sodium chloride infusion  25 mL/hr IntraVENous CONTINUOUS    fentaNYL citrate (PF) injection 12.5-100 mcg  12.5-100 mcg IntraVENous Multiple    midazolam (VERSED) injection 0.25-2 mg  0.25-2 mg IntraVENous Multiple    diphenhydrAMINE (BENADRYL) injection 50 mg  50 mg IntraVENous Multiple    HYDROcodone-acetaminophen (NORCO)  mg tablet 1 Tab  1 Tab Oral Q6H PRN    labetaloL (NORMODYNE;TRANDATE) injection 20 mg  20 mg IntraVENous Q1H PRN    cloNIDine HCL (CATAPRES) tablet 0.2 mg  0.2 mg Oral BID    sevelamer carbonate (RENVELA) tab 800 mg  800 mg Oral TID WITH MEALS    labetaloL (NORMODYNE) tablet 300 mg  300 mg Oral TID    famotidine (PEPCID) tablet 20 mg  20 mg Oral DAILY    amLODIPine (NORVASC) tablet 10 mg  10 mg Oral DAILY    insulin lispro (HUMALOG) injection 0-10 Units  0-10 Units SubCUTAneous AC&HS    lip protectant (BLISTEX) ointment 1 Each  1 Each Topical PRN    alum-mag hydroxide-simeth (MYLANTA) oral suspension 30 mL  30 mL Oral Q4H PRN    heparin (porcine) injection 5,000 Units  5,000 Units SubCUTAneous Q8H    sodium chloride (NS) flush 5-40 mL  5-40 mL IntraVENous Q8H    sodium chloride (NS) flush 5-40 mL  5-40 mL IntraVENous PRN    acetaminophen (TYLENOL) tablet 650 mg  650 mg Oral Q6H PRN    Or    acetaminophen (TYLENOL) suppository 650 mg  650 mg Rectal Q6H PRN    polyethylene glycol (MIRALAX) packet 17 g  17 g Oral DAILY PRN    promethazine (PHENERGAN) tablet 12.5 mg  12.5 mg Oral Q6H PRN    cefTRIAXone (ROCEPHIN) 1 g in 0.9% sodium chloride (MBP/ADV) 50 mL MBP  1 g IntraVENous Q24H    hydrALAZINE (APRESOLINE) tablet 25 mg  25 mg Oral TID PRN       Other Studies:  No results found for this visit on 12/23/20. No results found. All Micro Results     Procedure Component Value Units Date/Time    CULTURE, URINE [[id number]] Collected: 12/24/20 0016    Order Status: Completed Specimen: Cath Urine Updated: 12/26/20 0719     Special Requests: NO SPECIAL REQUESTS        Culture result:       >100,000 COLONIES/mL MIXED SKIN AZEEM ISOLATED                  THREE OR MORE TYPES OF ORGANISMS ARE PRESENT. THIS IS INDICATIVE OF CONTAMINATION DUE TO IMPROPER COLLECTION TECHNIQUE. PLEASE REPEAT COLLECTION UNLESS PATIENT HAS STARTED ANTIBIOTIC TREATMENT.                 SARS-CoV-2 Lab Results  \"Novel Coronavirus\" Test: No results found for: COV2NT   \"Emergent Disease\" Test: No results found for: EDPR  \"SARS-COV-2\" Test: No results found for: XGCOVT  Rapid Test:   Lab Results   Component Value Date/Time    COVR Not detected 12/24/2020 12:56 AM            Assessment and Plan:     Hospital Problems as of 12/30/2020 Never Reviewed          Codes Class Noted - Resolved POA    [facility] ICD-10-CM: N17.9  ICD-9-CM: 584.9  12/24/2020 - Present Unknown        Elevated procalcitonin ICD-10-CM: R79.89  ICD-9-CM: 790.99  12/24/2020 - Present Unknown        Obesity ICD-10-CM: E66.9  ICD-9-CM: 278.00  12/24/2020 - Present Unknown        Hx of essential hypertension ICD-10-CM: Z86.79  ICD-9-CM: V12.59  12/24/2020 - Present Unknown        Microcytic anemia ICD-10-CM: D50.9  ICD-9-CM: 280.9  12/24/2020 - Present Unknown              Plan:  # Acute renal failure - with significant proteinuria w/o edema on exam.   - Strict I/O, collins in place  - Serologies ordered and all negative- DEVEN, complement levels, UPEP, SPEP, Hepatitis, ANCA  - urine culture with 100k orgasnism - Mixed skin jae, s/p rocephin for treatment of possible pyelo   - Renal ultrasound without hydronephrosis or acute obstruction  - nephrology consulted, temporary dialysis catheter placed 12/24, underwent dialysis 12/24 with repeat HD 12/26, 12/28 without improvement in RF  - s/p renal biopsy with path pending  - CW for outpatient HD, TCC placement today      # Bibasilar infiltrates on CT from urgent care  - r/o Covid-19 (rapid negative), PCR negative  - likely pulm edema from poor oncotic pressure and ARF  - satting well on RA     #  HTN  - non compliant with OP meds  - s/p cardene  - uncontrolled on toprol and norvasc  - stop toprol, increased labetalol 300mg TID  - increase clonidine 0.2mg BID   - cont norvasc 10mg    #GERD/indigestion  - pepcid BID  - maalox prn      # microcytic anemia  - iron wnl     DC planning/Dispo:  Pending permanent catheter dialysis today and outpatient dialysis set up. Case mngmt to assist with dialysis chair.        Diet:  DIET RENAL  DVT ppx:  Heparin subq    Signed:  Sammi Noel MD

## 2020-12-31 PROBLEM — Z99.2 ACUTE RENAL FAILURE ON DIALYSIS (HCC): Status: ACTIVE | Noted: 2020-12-31

## 2020-12-31 PROBLEM — N17.9 ACUTE RENAL FAILURE ON DIALYSIS (HCC): Status: ACTIVE | Noted: 2020-12-31

## 2020-12-31 LAB
ALBUMIN SERPL-MCNC: 1.9 G/DL (ref 3.5–5)
ANION GAP SERPL CALC-SCNC: 4 MMOL/L (ref 7–16)
BASOPHILS # BLD: 0 K/UL (ref 0–0.2)
BASOPHILS NFR BLD: 0 % (ref 0–2)
BUN SERPL-MCNC: 34 MG/DL (ref 6–23)
CALCIUM SERPL-MCNC: 9 MG/DL (ref 8.3–10.4)
CHLORIDE SERPL-SCNC: 101 MMOL/L (ref 98–107)
CO2 SERPL-SCNC: 30 MMOL/L (ref 21–32)
CREAT SERPL-MCNC: 8.98 MG/DL (ref 0.6–1)
DIFFERENTIAL METHOD BLD: ABNORMAL
EOSINOPHIL # BLD: 0.2 K/UL (ref 0–0.8)
EOSINOPHIL NFR BLD: 2 % (ref 0.5–7.8)
ERYTHROCYTE [DISTWIDTH] IN BLOOD BY AUTOMATED COUNT: 14.6 % (ref 11.9–14.6)
GLUCOSE BLD STRIP.AUTO-MCNC: 140 MG/DL (ref 65–100)
GLUCOSE BLD STRIP.AUTO-MCNC: 150 MG/DL (ref 65–100)
GLUCOSE BLD STRIP.AUTO-MCNC: 154 MG/DL (ref 65–100)
GLUCOSE BLD STRIP.AUTO-MCNC: 155 MG/DL (ref 65–100)
GLUCOSE SERPL-MCNC: 138 MG/DL (ref 65–100)
HCT VFR BLD AUTO: 31.4 % (ref 35.8–46.3)
HGB BLD-MCNC: 9.6 G/DL (ref 11.7–15.4)
IMM GRANULOCYTES # BLD AUTO: 0.2 K/UL (ref 0–0.5)
IMM GRANULOCYTES NFR BLD AUTO: 3 % (ref 0–5)
LYMPHOCYTES # BLD: 1.2 K/UL (ref 0.5–4.6)
LYMPHOCYTES NFR BLD: 13 % (ref 13–44)
MCH RBC QN AUTO: 24.9 PG (ref 26.1–32.9)
MCHC RBC AUTO-ENTMCNC: 30.6 G/DL (ref 31.4–35)
MCV RBC AUTO: 81.6 FL (ref 79.6–97.8)
MONOCYTES # BLD: 1.1 K/UL (ref 0.1–1.3)
MONOCYTES NFR BLD: 11 % (ref 4–12)
NEUTS SEG # BLD: 6.9 K/UL (ref 1.7–8.2)
NEUTS SEG NFR BLD: 72 % (ref 43–78)
NRBC # BLD: 0 K/UL (ref 0–0.2)
PHOSPHATE SERPL-MCNC: 6.5 MG/DL (ref 2.5–4.5)
PLATELET # BLD AUTO: 476 K/UL (ref 150–450)
PMV BLD AUTO: 9.3 FL (ref 9.4–12.3)
POTASSIUM SERPL-SCNC: 4.7 MMOL/L (ref 3.5–5.1)
RBC # BLD AUTO: 3.85 M/UL (ref 4.05–5.2)
SODIUM SERPL-SCNC: 135 MMOL/L (ref 136–145)
WBC # BLD AUTO: 9.7 K/UL (ref 4.3–11.1)

## 2020-12-31 PROCEDURE — 86580 TB INTRADERMAL TEST: CPT | Performed by: INTERNAL MEDICINE

## 2020-12-31 PROCEDURE — 74011250636 HC RX REV CODE- 250/636: Performed by: STUDENT IN AN ORGANIZED HEALTH CARE EDUCATION/TRAINING PROGRAM

## 2020-12-31 PROCEDURE — 74011250637 HC RX REV CODE- 250/637: Performed by: NURSE PRACTITIONER

## 2020-12-31 PROCEDURE — 74011250636 HC RX REV CODE- 250/636: Performed by: INTERNAL MEDICINE

## 2020-12-31 PROCEDURE — 85025 COMPLETE CBC W/AUTO DIFF WBC: CPT

## 2020-12-31 PROCEDURE — 82962 GLUCOSE BLOOD TEST: CPT

## 2020-12-31 PROCEDURE — 36415 COLL VENOUS BLD VENIPUNCTURE: CPT

## 2020-12-31 PROCEDURE — 74011636637 HC RX REV CODE- 636/637: Performed by: STUDENT IN AN ORGANIZED HEALTH CARE EDUCATION/TRAINING PROGRAM

## 2020-12-31 PROCEDURE — 74011000302 HC RX REV CODE- 302: Performed by: INTERNAL MEDICINE

## 2020-12-31 PROCEDURE — 74011000258 HC RX REV CODE- 258: Performed by: INTERNAL MEDICINE

## 2020-12-31 PROCEDURE — 65660000000 HC RM CCU STEPDOWN

## 2020-12-31 PROCEDURE — 80069 RENAL FUNCTION PANEL: CPT

## 2020-12-31 PROCEDURE — 74011250637 HC RX REV CODE- 250/637: Performed by: STUDENT IN AN ORGANIZED HEALTH CARE EDUCATION/TRAINING PROGRAM

## 2020-12-31 RX ORDER — AMLODIPINE BESYLATE 5 MG/1
5 TABLET ORAL DAILY
Status: DISCONTINUED | OUTPATIENT
Start: 2021-01-01 | End: 2021-01-05

## 2020-12-31 RX ADMIN — LABETALOL HYDROCHLORIDE 300 MG: 200 TABLET, FILM COATED ORAL at 21:47

## 2020-12-31 RX ADMIN — HEPARIN SODIUM 5000 UNITS: 5000 INJECTION INTRAVENOUS; SUBCUTANEOUS at 13:04

## 2020-12-31 RX ADMIN — LABETALOL HYDROCHLORIDE 300 MG: 200 TABLET, FILM COATED ORAL at 17:03

## 2020-12-31 RX ADMIN — CLONIDINE HYDROCHLORIDE 0.2 MG: 0.1 TABLET ORAL at 21:47

## 2020-12-31 RX ADMIN — AMLODIPINE BESYLATE 10 MG: 10 TABLET ORAL at 08:27

## 2020-12-31 RX ADMIN — INSULIN LISPRO 2 UNITS: 100 INJECTION, SOLUTION INTRAVENOUS; SUBCUTANEOUS at 11:59

## 2020-12-31 RX ADMIN — SEVELAMER CARBONATE 800 MG: 800 TABLET, FILM COATED ORAL at 17:03

## 2020-12-31 RX ADMIN — Medication 10 ML: at 21:49

## 2020-12-31 RX ADMIN — SEVELAMER CARBONATE 800 MG: 800 TABLET, FILM COATED ORAL at 08:27

## 2020-12-31 RX ADMIN — Medication 10 ML: at 13:04

## 2020-12-31 RX ADMIN — LABETALOL HYDROCHLORIDE 300 MG: 200 TABLET, FILM COATED ORAL at 08:27

## 2020-12-31 RX ADMIN — HEPARIN SODIUM 5000 UNITS: 5000 INJECTION INTRAVENOUS; SUBCUTANEOUS at 21:48

## 2020-12-31 RX ADMIN — INSULIN LISPRO 2 UNITS: 100 INJECTION, SOLUTION INTRAVENOUS; SUBCUTANEOUS at 21:52

## 2020-12-31 RX ADMIN — HEPARIN SODIUM 5000 UNITS: 5000 INJECTION INTRAVENOUS; SUBCUTANEOUS at 04:22

## 2020-12-31 RX ADMIN — CEFTRIAXONE SODIUM 1 G: 1 INJECTION, POWDER, FOR SOLUTION INTRAMUSCULAR; INTRAVENOUS at 04:22

## 2020-12-31 RX ADMIN — SEVELAMER CARBONATE 800 MG: 800 TABLET, FILM COATED ORAL at 11:59

## 2020-12-31 RX ADMIN — Medication 10 ML: at 04:22

## 2020-12-31 RX ADMIN — INSULIN LISPRO 2 UNITS: 100 INJECTION, SOLUTION INTRAVENOUS; SUBCUTANEOUS at 17:03

## 2020-12-31 RX ADMIN — CLONIDINE HYDROCHLORIDE 0.2 MG: 0.1 TABLET ORAL at 08:27

## 2020-12-31 RX ADMIN — FAMOTIDINE 20 MG: 20 TABLET, FILM COATED ORAL at 08:27

## 2020-12-31 RX ADMIN — TUBERCULIN PURIFIED PROTEIN DERIVATIVE 5 UNITS: 5 INJECTION, SOLUTION INTRADERMAL at 12:00

## 2020-12-31 NOTE — PROGRESS NOTES
Hospitalist Note     Admit Date:  2020 10:57 PM   Name:  Tiana Haines   Age:  37 y.o.  :  1985   MRN:  [id number]   PCP:  Robby Brewer MD  Treatment Team: Attending Provider: Margarita Black MD; Consulting Provider: David Juarez NP; Utilization Review: Alma Ramirez RN; Tech: Elvis Galarza; Utilization Review: Rojas Pitts; Care Manager: Sharyle Butte, LMSW; Medical Assistant: Pa Sanchez RN; Charge Nurse: Marii Del Rosario    HPI/Subjective:     Ms. Kishore Valenzuela is a 38 yo female with PMH of HTN, DM2, admitted with JOSSELIN. She has been seen by nephrology and required hemodialysis that will be needed longterm. TCC placed per IR. Completed course of empiric rocephin 7 days, urine cx skin jae. She is s/p renal biopsy pending. She is pending outpatient HD slot. Discharge is expected to home with mother.          20 eating ok, no BM change, making urine, no dyspnea,     Objective:     Patient Vitals for the past 24 hrs:   Temp Pulse Resp BP SpO2   20 0718 98 °F (36.7 °C) 80 18 133/86 99 %   20 0345 98 °F (36.7 °C) 85 18 118/78 96 %   20 0111 -- 77 -- -- --   20 2324 98 °F (36.7 °C) 83 18 128/70 97 %   20 1910 97.7 °F (36.5 °C) 84 18 124/80 96 %   20 1617 97.9 °F (36.6 °C) 87 18 135/84 98 %   20 1526 -- 84 -- (!) 147/85 --   20 1500 -- 86 -- 130/83 --   20 1430 -- 83 -- 122/81 --   20 1400 -- 85 -- 133/83 --   20 1330 -- 79 -- (!) 135/91 --   20 1310 -- 80 -- (!) 135/93 --   20 1230 -- 79 -- (!) 142/83 --   20 1200 -- 79 -- (!) 128/92 --   20 1143 -- 80 -- 138/84 --     Oxygen Therapy  O2 Sat (%): 99 % (20 0718)  Pulse via Oximetry: 84 beats per minute (20 1133)  O2 Device: Nasal cannula (20 0840)  O2 Flow Rate (L/min): 3 l/min (20 0840)  ETCO2 (mmHg): 42 mmHg (20 1054)    Estimated body mass index is 37.08 kg/m² as calculated from the following:    Height as of this encounter: 5' 5\" (1.651 m). Weight as of this encounter: 101.1 kg (222 lb 12.8 oz). Intake/Output Summary (Last 24 hours) at 12/31/2020 0948  Last data filed at 12/30/2020 2015  Gross per 24 hour   Intake --   Output 1550 ml   Net -1550 ml       *Note that automatically entered I/Os may not be accurate; dependent on patient compliance with collection and accurate  by techs. General:    Well nourished. Alert. No distress, obese  CV:   RRR. No murmur, rub, or gallop. No edema   Lungs:   CTAB. No wheezing, rhonchi, or rales. Abdomen:   Soft, nontender, nondistended. Obese, present BS  Extremities: Warm and dry. Skin:     No rashes or jaundice.    Neuro:  No gross focal deficits    Data Review:  I have reviewed all labs, meds, and studies from the last 24 hours:  Recent Results (from the past 24 hour(s))   GLUCOSE, POC    Collection Time: 12/30/20 11:07 AM   Result Value Ref Range    Glucose (POC) 158 (H) 65 - 100 mg/dL   GLUCOSE, POC    Collection Time: 12/30/20  4:13 PM   Result Value Ref Range    Glucose (POC) 106 (H) 65 - 100 mg/dL   GLUCOSE, POC    Collection Time: 12/30/20  8:19 PM   Result Value Ref Range    Glucose (POC) 147 (H) 65 - 100 mg/dL   RENAL FUNCTION PANEL    Collection Time: 12/31/20  6:09 AM   Result Value Ref Range    Sodium 135 (L) 136 - 145 mmol/L    Potassium 4.7 3.5 - 5.1 mmol/L    Chloride 101 98 - 107 mmol/L    CO2 30 21 - 32 mmol/L    Anion gap 4 (L) 7 - 16 mmol/L    Glucose 138 (H) 65 - 100 mg/dL    BUN 34 (H) 6 - 23 MG/DL    Creatinine 8.98 (H) 0.6 - 1.0 MG/DL    GFR est AA 7 (L) >60 ml/min/1.73m2    GFR est non-AA 5 (L) >60 ml/min/1.73m2    Calcium 9.0 8.3 - 10.4 MG/DL    Phosphorus 6.5 (H) 2.5 - 4.5 MG/DL    Albumin 1.9 (L) 3.5 - 5.0 g/dL   CBC WITH AUTOMATED DIFF    Collection Time: 12/31/20  6:09 AM   Result Value Ref Range    WBC 9.7 4.3 - 11.1 K/uL    RBC 3.85 (L) 4.05 - 5.2 M/uL    HGB 9.6 (L) 11.7 - 15.4 g/dL    HCT 31.4 (L) 35.8 - 46.3 %    MCV 81.6 79.6 - 97.8 FL    MCH 24.9 (L) 26.1 - 32.9 PG    MCHC 30.6 (L) 31.4 - 35.0 g/dL    RDW 14.6 11.9 - 14.6 %    PLATELET 238 (H) 446 - 450 K/uL    MPV 9.3 (L) 9.4 - 12.3 FL    ABSOLUTE NRBC 0.00 0.0 - 0.2 K/uL    DF AUTOMATED      NEUTROPHILS 72 43 - 78 %    LYMPHOCYTES 13 13 - 44 %    MONOCYTES 11 4.0 - 12.0 %    EOSINOPHILS 2 0.5 - 7.8 %    BASOPHILS 0 0.0 - 2.0 %    IMMATURE GRANULOCYTES 3 0.0 - 5.0 %    ABS. NEUTROPHILS 6.9 1.7 - 8.2 K/UL    ABS. LYMPHOCYTES 1.2 0.5 - 4.6 K/UL    ABS. MONOCYTES 1.1 0.1 - 1.3 K/UL    ABS. EOSINOPHILS 0.2 0.0 - 0.8 K/UL    ABS. BASOPHILS 0.0 0.0 - 0.2 K/UL    ABS. IMM.  GRANS. 0.2 0.0 - 0.5 K/UL   GLUCOSE, POC    Collection Time: 12/31/20  7:16 AM   Result Value Ref Range    Glucose (POC) 140 (H) 65 - 100 mg/dL        Current Meds:  Current Facility-Administered Medications   Medication Dose Route Frequency    midazolam (VERSED) injection 0.5-2 mg  0.5-2 mg IntraVENous Multiple    fentaNYL citrate (PF) injection 25-50 mcg  25-50 mcg IntraVENous Multiple    epoetin marianne-epbx (RETACRIT) injection 10,000 Units  10,000 Units SubCUTAneous Q7D    0.9% sodium chloride infusion  25 mL/hr IntraVENous CONTINUOUS    fentaNYL citrate (PF) injection 12.5-100 mcg  12.5-100 mcg IntraVENous Multiple    midazolam (VERSED) injection 0.25-2 mg  0.25-2 mg IntraVENous Multiple    diphenhydrAMINE (BENADRYL) injection 50 mg  50 mg IntraVENous Multiple    HYDROcodone-acetaminophen (NORCO)  mg tablet 1 Tab  1 Tab Oral Q6H PRN    labetaloL (NORMODYNE;TRANDATE) injection 20 mg  20 mg IntraVENous Q1H PRN    cloNIDine HCL (CATAPRES) tablet 0.2 mg  0.2 mg Oral BID    sevelamer carbonate (RENVELA) tab 800 mg  800 mg Oral TID WITH MEALS    labetaloL (NORMODYNE) tablet 300 mg  300 mg Oral TID    famotidine (PEPCID) tablet 20 mg  20 mg Oral DAILY    amLODIPine (NORVASC) tablet 10 mg  10 mg Oral DAILY    insulin lispro (HUMALOG) injection 0-10 Units  0-10 Units SubCUTAneous AC&HS    lip protectant (BLISTEX) ointment 1 Each  1 Each Topical PRN    alum-mag hydroxide-simeth (MYLANTA) oral suspension 30 mL  30 mL Oral Q4H PRN    heparin (porcine) injection 5,000 Units  5,000 Units SubCUTAneous Q8H    sodium chloride (NS) flush 5-40 mL  5-40 mL IntraVENous Q8H    sodium chloride (NS) flush 5-40 mL  5-40 mL IntraVENous PRN    acetaminophen (TYLENOL) tablet 650 mg  650 mg Oral Q6H PRN    Or    acetaminophen (TYLENOL) suppository 650 mg  650 mg Rectal Q6H PRN    polyethylene glycol (MIRALAX) packet 17 g  17 g Oral DAILY PRN    promethazine (PHENERGAN) tablet 12.5 mg  12.5 mg Oral Q6H PRN    hydrALAZINE (APRESOLINE) tablet 25 mg  25 mg Oral TID PRN       Other Studies:  No results found for this visit on 12/23/20. No results found. All Micro Results     Procedure Component Value Units Date/Time    CULTURE, URINE [[id number]] Collected: 12/24/20 0016    Order Status: Completed Specimen: Cath Urine Updated: 12/26/20 0719     Special Requests: NO SPECIAL REQUESTS        Culture result:       >100,000 COLONIES/mL MIXED SKIN AZEEM ISOLATED                  THREE OR MORE TYPES OF ORGANISMS ARE PRESENT. THIS IS INDICATIVE OF CONTAMINATION DUE TO IMPROPER COLLECTION TECHNIQUE. PLEASE REPEAT COLLECTION UNLESS PATIENT HAS STARTED ANTIBIOTIC TREATMENT.                 SARS-CoV-2 Lab Results  \"Novel Coronavirus\" Test: No results found for: COV2NT   \"Emergent Disease\" Test: No results found for: EDPR  \"SARS-COV-2\" Test: No results found for: XGCOVT  Rapid Test:   Lab Results   Component Value Date/Time    COVR Not detected 12/24/2020 12:56 AM            Assessment and Plan:     Hospital Problems as of 12/31/2020 Never Reviewed          Codes Class Noted - Resolved POA    * (Principal) Acute renal failure on dialysis [facility]) ICD-10-CM: N17.9, Z99.2  ICD-9-CM: 584.9, V45.11  12/31/2020 - Present Yes        Acute renal failure (ARF) ([facility] [address] ICD-10-CM: N17.9  ICD-9-CM: 584.9  12/24/2020 - Present Unknown        Elevated procalcitonin ICD-10-CM: R79.89  ICD-9-CM: 790.99  12/24/2020 - Present Unknown        Obesity ICD-10-CM: E66.9  ICD-9-CM: 278.00  12/24/2020 - Present Unknown        Hx of essential hypertension ICD-10-CM: Z86.79  ICD-9-CM: V12.59  12/24/2020 - Present Unknown        Microcytic anemia ICD-10-CM: D50.9  ICD-9-CM: 280.9  12/24/2020 - Present Unknown              Plan:  · New onset JOSSELIN progressed to CKD, ESRD on new hemodialysis:  · Discussed with nephrology and can discharge once has HD slot  · TCC in place   · followup renal biopsy  · Completed course of empiric rocephin 7 days      · HTN:  · Continued norvasc, clonidine, labetalol      · DM2:  · On SSI  · DM2 education         DC planning/Dispo:   To home once has outpatient slot       Diet:  DIET RENAL  DVT ppx:  heparin    Signed:  Charisma Ragland MD

## 2020-12-31 NOTE — PROGRESS NOTES
Chart screened by CM for d/c planning. Pt will require dialysis once she is d/c and will be a new dialysis pt in the community. CM spoke with the main  for [address] [facility] [place] - [phone] to verify whether or not they still require a pt to have a PPD placed and read prior to hospital d/c. He stated yes this is still a requirement. Pt has not had a PPD placed. DANIELLE updated Dr. Yeimy Lindo via Cherise Grullon. She ordered the PPD and will d/c pt once this has been completed. Pt will need a new pt referral packet completed when she is medically stable for d/c. CM will continue to follow and remain available if any needs arise.

## 2021-01-01 LAB
ALBUMIN SERPL-MCNC: 2 G/DL (ref 3.5–5)
ANION GAP SERPL CALC-SCNC: 7 MMOL/L (ref 7–16)
BUN SERPL-MCNC: 43 MG/DL (ref 6–23)
CALCIUM SERPL-MCNC: 9.2 MG/DL (ref 8.3–10.4)
CHLORIDE SERPL-SCNC: 100 MMOL/L (ref 98–107)
CO2 SERPL-SCNC: 29 MMOL/L (ref 21–32)
CREAT SERPL-MCNC: 11 MG/DL (ref 0.6–1)
GLUCOSE BLD STRIP.AUTO-MCNC: 117 MG/DL (ref 65–100)
GLUCOSE BLD STRIP.AUTO-MCNC: 130 MG/DL (ref 65–100)
GLUCOSE BLD STRIP.AUTO-MCNC: 139 MG/DL (ref 65–100)
GLUCOSE SERPL-MCNC: 129 MG/DL (ref 65–100)
MM INDURATION POC: 0 MM (ref 0–5)
PHOSPHATE SERPL-MCNC: 8.2 MG/DL (ref 2.5–4.5)
POTASSIUM SERPL-SCNC: 4.5 MMOL/L (ref 3.5–5.1)
PPD POC: NEGATIVE NEGATIVE
SODIUM SERPL-SCNC: 136 MMOL/L (ref 136–145)

## 2021-01-01 PROCEDURE — 74011250637 HC RX REV CODE- 250/637: Performed by: STUDENT IN AN ORGANIZED HEALTH CARE EDUCATION/TRAINING PROGRAM

## 2021-01-01 PROCEDURE — 82962 GLUCOSE BLOOD TEST: CPT

## 2021-01-01 PROCEDURE — 90935 HEMODIALYSIS ONE EVALUATION: CPT

## 2021-01-01 PROCEDURE — 65660000000 HC RM CCU STEPDOWN

## 2021-01-01 PROCEDURE — 36415 COLL VENOUS BLD VENIPUNCTURE: CPT

## 2021-01-01 PROCEDURE — 74011250637 HC RX REV CODE- 250/637: Performed by: NURSE PRACTITIONER

## 2021-01-01 PROCEDURE — 74011250636 HC RX REV CODE- 250/636: Performed by: STUDENT IN AN ORGANIZED HEALTH CARE EDUCATION/TRAINING PROGRAM

## 2021-01-01 PROCEDURE — 80069 RENAL FUNCTION PANEL: CPT

## 2021-01-01 RX ORDER — HEPARIN SODIUM 1000 [USP'U]/ML
5000 INJECTION, SOLUTION INTRAVENOUS; SUBCUTANEOUS
Status: DISCONTINUED | OUTPATIENT
Start: 2021-01-01 | End: 2021-01-11 | Stop reason: HOSPADM

## 2021-01-01 RX ADMIN — CLONIDINE HYDROCHLORIDE 0.2 MG: 0.1 TABLET ORAL at 21:44

## 2021-01-01 RX ADMIN — Medication 10 ML: at 06:09

## 2021-01-01 RX ADMIN — FAMOTIDINE 20 MG: 20 TABLET, FILM COATED ORAL at 11:27

## 2021-01-01 RX ADMIN — SEVELAMER CARBONATE 800 MG: 800 TABLET, FILM COATED ORAL at 16:35

## 2021-01-01 RX ADMIN — LABETALOL HYDROCHLORIDE 300 MG: 200 TABLET, FILM COATED ORAL at 21:44

## 2021-01-01 RX ADMIN — Medication 10 ML: at 13:30

## 2021-01-01 RX ADMIN — HEPARIN SODIUM 5000 UNITS: 5000 INJECTION INTRAVENOUS; SUBCUTANEOUS at 06:09

## 2021-01-01 RX ADMIN — HEPARIN SODIUM 5000 UNITS: 5000 INJECTION INTRAVENOUS; SUBCUTANEOUS at 12:39

## 2021-01-01 RX ADMIN — Medication 10 ML: at 21:49

## 2021-01-01 RX ADMIN — SEVELAMER CARBONATE 800 MG: 800 TABLET, FILM COATED ORAL at 11:27

## 2021-01-01 RX ADMIN — HEPARIN SODIUM 5000 UNITS: 5000 INJECTION INTRAVENOUS; SUBCUTANEOUS at 21:45

## 2021-01-01 RX ADMIN — LABETALOL HYDROCHLORIDE 300 MG: 200 TABLET, FILM COATED ORAL at 16:35

## 2021-01-01 NOTE — PROGRESS NOTES
Hospitalist Note     Admit Date:  2020 10:57 PM   Name:  Marcelle Daley   Age:  37 y.o.  :  1985   MRN:  [id number]   PCP:  Yeimi Dailey MD  Treatment Team: Attending Provider: Gladys Horton MD; Consulting Provider: Herber Lovell NP; Utilization Review: Victor Manuel Serrano RN; Tech: Morganala Faes; Utilization Review: Armando Sheriff; Care Manager: Inez Carrillo LMSW; Medical Assistant: Homar Garzon RN; Student Nurse: Narda Pascal RN; Charge Nurse: Isak Fields    HPI/Subjective:     Ms. Onita Cowden is a 36 yo female with PMH of HTN, DM2, admitted with JOSSELIN. She has been seen by nephrology and required hemodialysis that likely will be needed longterm. TCC placed per IR. Completed course of empiric rocephin 7 days, urine cx skin jae. She is s/p renal biopsy path showing \"inflammation, collapsing glomerulopathy and hypertensive changes\", per nephrology she is not quite end stage disease but high risk. She is pending outpatient HD slot. Discharge is expected to home with mother.          2021 seen in HD, wants to go back to her room, some anorexia, some constipation, getting collins out today, no dyspnea     Objective:     Patient Vitals for the past 24 hrs:   Temp Pulse Resp BP SpO2   21 1015 -- 90 -- (!) 144/88 --   21 1000 -- 90 -- (!) 144/78 --   21 0930 -- 98 -- (!) 140/75 --   21 0905 -- 78 -- (!) 154/98 --   21 0830 -- 78 -- (!) 155/100 --   21 0800 -- 78 -- (!) 156/93 --   21 0730 -- 83 -- (!) 150/92 --   21 0711 -- 80 18 (!) 158/89 --   21 0456 97.7 °F (36.5 °C) 78 18 130/85 96 %   21 0025 97.5 °F (36.4 °C) 85 18 138/83 94 %   20 1910 97.2 °F (36.2 °C) 79 18 (!) 142/83 97 %   20 1107 97.8 °F (36.6 °C) 79 18 111/75 99 %     Oxygen Therapy  O2 Sat (%): 96 % (21 0456)  Pulse via Oximetry: 84 beats per minute (20 1133)  O2 Device: Nasal cannula (20 0840)  O2 Flow Rate (L/min): 3 l/min (12/30/20 0840)  ETCO2 (mmHg): 42 mmHg (12/29/20 1054)    Estimated body mass index is 47.03 kg/m² as calculated from the following:    Height as of this encounter: 5' 5\" (1.651 m). Weight as of this encounter: 128.2 kg (282 lb 9.6 oz). Intake/Output Summary (Last 24 hours) at 1/1/2021 1053  Last data filed at 1/1/2021 1015  Gross per 24 hour   Intake --   Output 1750 ml   Net -1750 ml       *Note that automatically entered I/Os may not be accurate; dependent on patient compliance with collection and accurate  by techs. General:    Well nourished. Alert. No distress, obese  CV:   RRR. No murmur, rub, or gallop. No edema   Lungs:   CTAB. No wheezing, rhonchi, or rales. Anterior   Abdomen:   Soft, nontender, nondistended. Obese, present BS  Extremities: Warm and dry. Skin:     No rashes or jaundice.    Neuro:  No gross focal deficits    Data Review:  I have reviewed all labs, meds, and studies from the last 24 hours:  Recent Results (from the past 24 hour(s))   GLUCOSE, POC    Collection Time: 12/31/20 11:04 AM   Result Value Ref Range    Glucose (POC) 155 (H) 65 - 100 mg/dL   GLUCOSE, POC    Collection Time: 12/31/20  3:51 PM   Result Value Ref Range    Glucose (POC) 154 (H) 65 - 100 mg/dL   GLUCOSE, POC    Collection Time: 12/31/20  9:18 PM   Result Value Ref Range    Glucose (POC) 150 (H) 65 - 100 mg/dL   RENAL FUNCTION PANEL    Collection Time: 01/01/21  5:25 AM   Result Value Ref Range    Sodium 136 136 - 145 mmol/L    Potassium 4.5 3.5 - 5.1 mmol/L    Chloride 100 98 - 107 mmol/L    CO2 29 21 - 32 mmol/L    Anion gap 7 7 - 16 mmol/L    Glucose 129 (H) 65 - 100 mg/dL    BUN 43 (H) 6 - 23 MG/DL    Creatinine 11.00 (H) 0.6 - 1.0 MG/DL    GFR est AA 5 (L) >60 ml/min/1.73m2    GFR est non-AA 4 (L) >60 ml/min/1.73m2    Calcium 9.2 8.3 - 10.4 MG/DL    Phosphorus 8.2 (H) 2.5 - 4.5 MG/DL    Albumin 2.0 (L) 3.5 - 5.0 g/dL        Current Meds:  Current Facility-Administered Medications   Medication Dose Route Frequency    tuberculin injection 5 Units  5 Units IntraDERMal ONCE    amLODIPine (NORVASC) tablet 5 mg  5 mg Oral DAILY    midazolam (VERSED) injection 0.5-2 mg  0.5-2 mg IntraVENous Multiple    fentaNYL citrate (PF) injection 25-50 mcg  25-50 mcg IntraVENous Multiple    epoetin marianne-epbx (RETACRIT) injection 10,000 Units  10,000 Units SubCUTAneous Q7D    fentaNYL citrate (PF) injection 12.5-100 mcg  12.5-100 mcg IntraVENous Multiple    midazolam (VERSED) injection 0.25-2 mg  0.25-2 mg IntraVENous Multiple    diphenhydrAMINE (BENADRYL) injection 50 mg  50 mg IntraVENous Multiple    HYDROcodone-acetaminophen (NORCO)  mg tablet 1 Tab  1 Tab Oral Q6H PRN    labetaloL (NORMODYNE;TRANDATE) injection 20 mg  20 mg IntraVENous Q1H PRN    cloNIDine HCL (CATAPRES) tablet 0.2 mg  0.2 mg Oral BID    sevelamer carbonate (RENVELA) tab 800 mg  800 mg Oral TID WITH MEALS    labetaloL (NORMODYNE) tablet 300 mg  300 mg Oral TID    famotidine (PEPCID) tablet 20 mg  20 mg Oral DAILY    insulin lispro (HUMALOG) injection 0-10 Units  0-10 Units SubCUTAneous AC&HS    lip protectant (BLISTEX) ointment 1 Each  1 Each Topical PRN    alum-mag hydroxide-simeth (MYLANTA) oral suspension 30 mL  30 mL Oral Q4H PRN    heparin (porcine) injection 5,000 Units  5,000 Units SubCUTAneous Q8H    sodium chloride (NS) flush 5-40 mL  5-40 mL IntraVENous Q8H    sodium chloride (NS) flush 5-40 mL  5-40 mL IntraVENous PRN    acetaminophen (TYLENOL) tablet 650 mg  650 mg Oral Q6H PRN    Or    acetaminophen (TYLENOL) suppository 650 mg  650 mg Rectal Q6H PRN    polyethylene glycol (MIRALAX) packet 17 g  17 g Oral DAILY PRN    promethazine (PHENERGAN) tablet 12.5 mg  12.5 mg Oral Q6H PRN    hydrALAZINE (APRESOLINE) tablet 25 mg  25 mg Oral TID PRN       Other Studies:  No results found for this visit on 12/23/20. No results found.     All Micro Results     Procedure Component Value Units Date/Time    CULTURE, URINE [[id number]] Collected: 12/24/20 0016    Order Status: Completed Specimen: Cath Urine Updated: 12/26/20 0719     Special Requests: NO SPECIAL REQUESTS        Culture result:       >100,000 COLONIES/mL MIXED SKIN AZEEM ISOLATED                  THREE OR MORE TYPES OF ORGANISMS ARE PRESENT. THIS IS INDICATIVE OF CONTAMINATION DUE TO IMPROPER COLLECTION TECHNIQUE. PLEASE REPEAT COLLECTION UNLESS PATIENT HAS STARTED ANTIBIOTIC TREATMENT. SARS-CoV-2 Lab Results  \"Novel Coronavirus\" Test: No results found for: COV2NT   \"Emergent Disease\" Test: No results found for: EDPR  \"SARS-COV-2\" Test: No results found for: XGCOVT  Rapid Test:   Lab Results   Component Value Date/Time    COVR Not detected 12/24/2020 12:56 AM            Assessment and Plan:     Hospital Problems as of 1/1/2021 Never Reviewed          Codes Class Noted - Resolved POA    * (Principal) Acute renal failure on dialysis [facility]) ICD-10-CM: N17.9, Z99.2  ICD-9-CM: 584.9, V45.11  12/31/2020 - Present Yes        Acute renal failure (ARF) ([facility] [address] ICD-10-CM: N17.9  ICD-9-CM: 584.9  12/24/2020 - Present Unknown        Elevated procalcitonin ICD-10-CM: R79.89  ICD-9-CM: 790.99  12/24/2020 - Present Unknown        Obesity ICD-10-CM: E66.9  ICD-9-CM: 278.00  12/24/2020 - Present Unknown        Hx of essential hypertension ICD-10-CM: Z86.79  ICD-9-CM: V12.59  12/24/2020 - Present Unknown        Microcytic anemia ICD-10-CM: D50.9  ICD-9-CM: 280.9  12/24/2020 - Present Unknown              Plan:  · New onset JOSSELIN progressed to CKD, ESRD on new hemodialysis:  · can discharge once has HD slot  · TCC in place   ·   · Completed course of empiric rocephin 7 days      · HTN:  · Continued norvasc, clonidine, labetalol      · DM2:  · On SSI  · DM2 education         DC planning/Dispo:   To home once has outpatient slot       Diet:  DIET RENAL  DVT ppx:  heparin    Signed:  Last Williamson MD

## 2021-01-01 NOTE — PROGRESS NOTES
Problem: Falls - Risk of  Goal: *Absence of Falls  Description: Document Tripp Alcala Fall Risk and appropriate interventions in the flowsheet. Outcome: Progressing Towards Goal  Note: Fall Risk Interventions:            Medication Interventions: Bed/chair exit alarm, Patient to call before getting OOB, Teach patient to arise slowly    Elimination Interventions: Call light in reach, Patient to call for help with toileting needs, Stay With Me (per policy), Toileting schedule/hourly rounds, Toilet paper/wipes in reach              Problem: Patient Education: Go to Patient Education Activity  Goal: Patient/Family Education  Outcome: Progressing Towards Goal     Problem: Diabetes Self-Management  Goal: *Disease process and treatment process  Description: Define diabetes and identify own type of diabetes; list 3 options for treating diabetes. Outcome: Progressing Towards Goal  Goal: *Incorporating nutritional management into lifestyle  Description: Describe effect of type, amount and timing of food on blood glucose; list 3 methods for planning meals. Outcome: Progressing Towards Goal  Goal: *Incorporating physical activity into lifestyle  Description: State effect of exercise on blood glucose levels. Outcome: Progressing Towards Goal  Goal: *Developing strategies to promote health/change behavior  Description: Define the ABC's of diabetes; identify appropriate screenings, schedule and personal plan for screenings. Outcome: Progressing Towards Goal  Goal: *Using medications safely  Description: State effect of diabetes medications on diabetes; name diabetes medication taking, action and side effects. Outcome: Progressing Towards Goal  Goal: *Monitoring blood glucose, interpreting and using results  Description: Identify recommended blood glucose targets  and personal targets.   Outcome: Progressing Towards Goal  Goal: *Prevention, detection, treatment of acute complications  Description: List symptoms of hyper- and hypoglycemia; describe how to treat low blood sugar and actions for lowering  high blood glucose level. Outcome: Progressing Towards Goal  Goal: *Prevention, detection and treatment of chronic complications  Description: Define the natural course of diabetes and describe the relationship of blood glucose levels to long term complications of diabetes.   Outcome: Progressing Towards Goal  Goal: *Developing strategies to address psychosocial issues  Description: Describe feelings about living with diabetes; identify support needed and support network  Outcome: Progressing Towards Goal  Goal: *Insulin pump training  Outcome: Progressing Towards Goal  Goal: *Sick day guidelines  Outcome: Progressing Towards Goal  Goal: *Patient Specific Goal (EDIT GOAL, INSERT TEXT)  Outcome: Progressing Towards Goal     Problem: Patient Education: Go to Patient Education Activity  Goal: Patient/Family Education  Outcome: Progressing Towards Goal     Problem: Acute Renal Failure: Discharge Outcomes  Goal: *Optimal pain control at patient's stated goal  Outcome: Progressing Towards Goal  Goal: *Urinary output within identified parameters  Outcome: Progressing Towards Goal  Goal: *Hemodynamically stable  Outcome: Progressing Towards Goal  Goal: *Tolerating diet  Outcome: Progressing Towards Goal  Goal: *Lab values stabilized  Outcome: Progressing Towards Goal  Goal: *Verbalizes understanding and describes medication purposes and frequencies  Outcome: Progressing Towards Goal  Goal: *Medication reconciliation  Outcome: Progressing Towards Goal

## 2021-01-01 NOTE — DIALYSIS
TRANSFER IN - DIALYSIS    Received patient in dialysis unit  from [facility] (unit) for ordered procedure. Consent verified for renal replacement therapy. Patient a/ox4 and vital signs stable. /89,  P80    Hemodialysis initiated using R perm cath. Aspirated and flushed both ports without difficulty. Dressing clean, dry and intact. Replaced today. Machine settings per MD order. Will monitor during treatment.

## 2021-01-01 NOTE — PROGRESS NOTES
[facility] PROGRESS NOTE    Follow up for: JOSSELIN     Subjective:   Patient seen and examined on dialysis  Labs and chart reviewed- no sign of renal recovery     ROS:  Gen - no fever, no chills, appetite okay  CV - no chest pain, no orthopnea  Lung - no shortness of breath, no cough  Abd - no tenderness, no nausea, no vomiting  Ext - no edema    Objective:   Exam:  Vitals:    01/01/21 0800 01/01/21 0830 01/01/21 0905 01/01/21 0930   BP: (!) 156/93 (!) 155/100 (!) 154/98 (!) 140/75   Pulse: 78 78 78 98   Resp:       Temp:       SpO2:       Weight:       Height:             Intake/Output Summary (Last 24 hours) at 1/1/2021 0944  Last data filed at 1/1/2021 0501  Gross per 24 hour   Intake --   Output 350 ml   Net -350 ml       Current Facility-Administered Medications   Medication Dose Route Frequency    tuberculin injection 5 Units  5 Units IntraDERMal ONCE    amLODIPine (NORVASC) tablet 5 mg  5 mg Oral DAILY    midazolam (VERSED) injection 0.5-2 mg  0.5-2 mg IntraVENous Multiple    fentaNYL citrate (PF) injection 25-50 mcg  25-50 mcg IntraVENous Multiple    epoetin marianne-epbx (RETACRIT) injection 10,000 Units  10,000 Units SubCUTAneous Q7D    fentaNYL citrate (PF) injection 12.5-100 mcg  12.5-100 mcg IntraVENous Multiple    midazolam (VERSED) injection 0.25-2 mg  0.25-2 mg IntraVENous Multiple    diphenhydrAMINE (BENADRYL) injection 50 mg  50 mg IntraVENous Multiple    HYDROcodone-acetaminophen (NORCO)  mg tablet 1 Tab  1 Tab Oral Q6H PRN    labetaloL (NORMODYNE;TRANDATE) injection 20 mg  20 mg IntraVENous Q1H PRN    cloNIDine HCL (CATAPRES) tablet 0.2 mg  0.2 mg Oral BID    sevelamer carbonate (RENVELA) tab 800 mg  800 mg Oral TID WITH MEALS    labetaloL (NORMODYNE) tablet 300 mg  300 mg Oral TID    famotidine (PEPCID) tablet 20 mg  20 mg Oral DAILY    insulin lispro (HUMALOG) injection 0-10 Units  0-10 Units SubCUTAneous AC&HS    lip protectant (BLISTEX) ointment 1 Each  1 Each Topical PRN    alum-mag hydroxide-simeth (MYLANTA) oral suspension 30 mL  30 mL Oral Q4H PRN    heparin (porcine) injection 5,000 Units  5,000 Units SubCUTAneous Q8H    sodium chloride (NS) flush 5-40 mL  5-40 mL IntraVENous Q8H    sodium chloride (NS) flush 5-40 mL  5-40 mL IntraVENous PRN    acetaminophen (TYLENOL) tablet 650 mg  650 mg Oral Q6H PRN    Or    acetaminophen (TYLENOL) suppository 650 mg  650 mg Rectal Q6H PRN    polyethylene glycol (MIRALAX) packet 17 g  17 g Oral DAILY PRN    promethazine (PHENERGAN) tablet 12.5 mg  12.5 mg Oral Q6H PRN    hydrALAZINE (APRESOLINE) tablet 25 mg  25 mg Oral TID PRN       EXAM  GEN - Alert, oriented, in no distress  CV - S1, S2, Regular rate, no Rub   Lung - clear to auscultation bilaterally  Abd - soft, nontender, BS present  Ext - no edema  Access - right temp line- intact     Recent Labs     12/31/20  0609 12/30/20  0429   WBC 9.7 10.9   HGB 9.6* 9.5*   HCT 31.4* 30.1*   * 535*        Recent Labs     01/01/21  0525 12/31/20  0609 12/30/20  0426    135* 136   K 4.5 4.7 4.4    101 100   CO2 29 30 28   BUN 43* 34* 53*   CREA 11.00* 8.98* 12.20*   CA 9.2 9.0 8.7   * 138* 128*   PHOS 8.2* 6.5* 7.9*       Assessment and Plan: JOSSELIN versus progression of CKD - No recent cr available for review other than one on oct 2019 with cr 0.9   serologies pending - will decide on need for biopsy pending serologies , most likely ESRD from Uncontrolled and untreated HTN and diabetes   UA shows hematuria and proteinuria   Pyelonephritis seen on CT done in out pt setting   Hepatitis and HIV negative   US kidney chronic echogenicity seen     1st dialysis 12/24    Outpatient slot pending  S/p renal biopsy 12/29 - with inflammation,collapsing glomerulopathy and hypertemsive changes. So not ESRD but high risk for ESRD.     TCC placed 12/30    Anemia-  Fe stats ok, add alie     HTN controlled, diagnosed about 15 years ago with hx of non compliance with meds Hyperphosphatemia- on renvela     DM type II- per hosp A1C 10.2, pt was unaware of diabetes    Seen on HD well tolerated, Dialysis again tomorrow to change to Stephanie Ramsey MD

## 2021-01-02 LAB
ALBUMIN SERPL-MCNC: 2.1 G/DL (ref 3.5–5)
ANION GAP SERPL CALC-SCNC: 7 MMOL/L (ref 7–16)
BUN SERPL-MCNC: 33 MG/DL (ref 6–23)
CALCIUM SERPL-MCNC: 9.1 MG/DL (ref 8.3–10.4)
CHLORIDE SERPL-SCNC: 101 MMOL/L (ref 98–107)
CO2 SERPL-SCNC: 28 MMOL/L (ref 21–32)
CREAT SERPL-MCNC: 8.75 MG/DL (ref 0.6–1)
GLUCOSE BLD STRIP.AUTO-MCNC: 116 MG/DL (ref 65–100)
GLUCOSE BLD STRIP.AUTO-MCNC: 125 MG/DL (ref 65–100)
GLUCOSE BLD STRIP.AUTO-MCNC: 135 MG/DL (ref 65–100)
GLUCOSE BLD STRIP.AUTO-MCNC: 155 MG/DL (ref 65–100)
GLUCOSE SERPL-MCNC: 129 MG/DL (ref 65–100)
MM INDURATION POC: 0 MM (ref 0–5)
PHOSPHATE SERPL-MCNC: 6.4 MG/DL (ref 2.5–4.5)
POTASSIUM SERPL-SCNC: 4.2 MMOL/L (ref 3.5–5.1)
PPD POC: NEGATIVE NEGATIVE
SODIUM SERPL-SCNC: 136 MMOL/L (ref 136–145)

## 2021-01-02 PROCEDURE — 90935 HEMODIALYSIS ONE EVALUATION: CPT

## 2021-01-02 PROCEDURE — 74011636637 HC RX REV CODE- 636/637: Performed by: STUDENT IN AN ORGANIZED HEALTH CARE EDUCATION/TRAINING PROGRAM

## 2021-01-02 PROCEDURE — 74011250637 HC RX REV CODE- 250/637: Performed by: STUDENT IN AN ORGANIZED HEALTH CARE EDUCATION/TRAINING PROGRAM

## 2021-01-02 PROCEDURE — 74011250636 HC RX REV CODE- 250/636: Performed by: STUDENT IN AN ORGANIZED HEALTH CARE EDUCATION/TRAINING PROGRAM

## 2021-01-02 PROCEDURE — 80069 RENAL FUNCTION PANEL: CPT

## 2021-01-02 PROCEDURE — 65660000000 HC RM CCU STEPDOWN

## 2021-01-02 PROCEDURE — 74011250637 HC RX REV CODE- 250/637: Performed by: INTERNAL MEDICINE

## 2021-01-02 PROCEDURE — 82962 GLUCOSE BLOOD TEST: CPT

## 2021-01-02 PROCEDURE — 74011250637 HC RX REV CODE- 250/637: Performed by: NURSE PRACTITIONER

## 2021-01-02 RX ADMIN — LABETALOL HYDROCHLORIDE 300 MG: 200 TABLET, FILM COATED ORAL at 21:54

## 2021-01-02 RX ADMIN — CLONIDINE HYDROCHLORIDE 0.2 MG: 0.1 TABLET ORAL at 12:05

## 2021-01-02 RX ADMIN — HEPARIN SODIUM 5000 UNITS: 5000 INJECTION INTRAVENOUS; SUBCUTANEOUS at 06:12

## 2021-01-02 RX ADMIN — FAMOTIDINE 20 MG: 20 TABLET, FILM COATED ORAL at 12:05

## 2021-01-02 RX ADMIN — Medication 10 ML: at 06:14

## 2021-01-02 RX ADMIN — INSULIN LISPRO 2 UNITS: 100 INJECTION, SOLUTION INTRAVENOUS; SUBCUTANEOUS at 17:10

## 2021-01-02 RX ADMIN — SEVELAMER CARBONATE 800 MG: 800 TABLET, FILM COATED ORAL at 17:10

## 2021-01-02 RX ADMIN — AMLODIPINE BESYLATE 5 MG: 5 TABLET ORAL at 12:05

## 2021-01-02 RX ADMIN — SEVELAMER CARBONATE 800 MG: 800 TABLET, FILM COATED ORAL at 12:07

## 2021-01-02 RX ADMIN — HEPARIN SODIUM 5000 UNITS: 5000 INJECTION INTRAVENOUS; SUBCUTANEOUS at 21:55

## 2021-01-02 RX ADMIN — CLONIDINE HYDROCHLORIDE 0.2 MG: 0.1 TABLET ORAL at 21:55

## 2021-01-02 RX ADMIN — Medication 10 ML: at 21:58

## 2021-01-02 RX ADMIN — LABETALOL HYDROCHLORIDE 300 MG: 200 TABLET, FILM COATED ORAL at 17:10

## 2021-01-02 RX ADMIN — Medication 10 ML: at 13:08

## 2021-01-02 RX ADMIN — HEPARIN SODIUM 5000 UNITS: 5000 INJECTION INTRAVENOUS; SUBCUTANEOUS at 12:07

## 2021-01-02 NOTE — PROGRESS NOTES
Problem: Falls - Risk of  Goal: *Absence of Falls  Description: Document Arlyn Bingham Fall Risk and appropriate interventions in the flowsheet. Outcome: Progressing Towards Goal  Note: Fall Risk Interventions:            Medication Interventions: Patient to call before getting OOB, Teach patient to arise slowly    Elimination Interventions: Call light in reach              Problem: Patient Education: Go to Patient Education Activity  Goal: Patient/Family Education  Outcome: Progressing Towards Goal     Problem: Diabetes Self-Management  Goal: *Disease process and treatment process  Description: Define diabetes and identify own type of diabetes; list 3 options for treating diabetes. Outcome: Progressing Towards Goal  Goal: *Incorporating nutritional management into lifestyle  Description: Describe effect of type, amount and timing of food on blood glucose; list 3 methods for planning meals. Outcome: Progressing Towards Goal  Goal: *Incorporating physical activity into lifestyle  Description: State effect of exercise on blood glucose levels. Outcome: Progressing Towards Goal  Goal: *Developing strategies to promote health/change behavior  Description: Define the ABC's of diabetes; identify appropriate screenings, schedule and personal plan for screenings. Outcome: Progressing Towards Goal  Goal: *Using medications safely  Description: State effect of diabetes medications on diabetes; name diabetes medication taking, action and side effects. Outcome: Progressing Towards Goal  Goal: *Monitoring blood glucose, interpreting and using results  Description: Identify recommended blood glucose targets  and personal targets. Outcome: Progressing Towards Goal  Goal: *Prevention, detection, treatment of acute complications  Description: List symptoms of hyper- and hypoglycemia; describe how to treat low blood sugar and actions for lowering  high blood glucose level.   Outcome: Progressing Towards Goal  Goal: *Prevention, detection and treatment of chronic complications  Description: Define the natural course of diabetes and describe the relationship of blood glucose levels to long term complications of diabetes. Outcome: Progressing Towards Goal  Goal: *Developing strategies to address psychosocial issues  Description: Describe feelings about living with diabetes; identify support needed and support network  Outcome: Progressing Towards Goal  Goal: *Insulin pump training  Outcome: Progressing Towards Goal  Goal: *Sick day guidelines  Outcome: Progressing Towards Goal  Goal: *Patient Specific Goal (EDIT GOAL, INSERT TEXT)  Outcome: Progressing Towards Goal     Problem: Patient Education: Go to Patient Education Activity  Goal: Patient/Family Education  Outcome: Progressing Towards Goal     Problem: Acute Renal Failure: Discharge Outcomes  Goal: *Optimal pain control at patient's stated goal  Outcome: Progressing Towards Goal  Goal: *Urinary output within identified parameters  Outcome: Progressing Towards Goal  Goal: *Hemodynamically stable  Outcome: Progressing Towards Goal  Goal: *Tolerating diet  Outcome: Progressing Towards Goal  Goal: *Lab values stabilized  Outcome: Progressing Towards Goal  Goal: *Verbalizes understanding and describes medication purposes and frequencies  Outcome: Progressing Towards Goal  Goal: *Medication reconciliation  Outcome: Progressing Towards Goal     Problem: Pressure Injury - Risk of  Goal: *Prevention of pressure injury  Description: Document Oneal Scale and appropriate interventions in the flowsheet.   Outcome: Progressing Towards Goal     Problem: Patient Education: Go to Patient Education Activity  Goal: Patient/Family Education  Outcome: Progressing Towards Goal

## 2021-01-02 NOTE — DIALYSIS
TRANSFER OUT- DIALYSIS    Hemodialysis treatment completed without complications. Patient alert and VS: /100  P 84       1.3 Kgs removed. Flushed both ports with 10 mL of NS.  CVC dressing clean, dry, and intact, tego caps intact, bilateral lumens wrapped with 4x4 gauze. Patient to room 714 after dialysis.

## 2021-01-02 NOTE — PROGRESS NOTES
[facility] PROGRESS NOTE    Follow up for: JOSSELIN     Subjective:   Patient seen and examined on dialysis  Labs and chart reviewed- no sign of renal recovery     ROS:  Gen - no fever, no chills, appetite okay  CV - no chest pain, no orthopnea  Lung - no shortness of breath, no cough  Abd - no tenderness, no nausea, no vomiting  Ext - no edema    Objective:   Exam:  Vitals:    01/02/21 0757 01/02/21 0831 01/02/21 0901 01/02/21 0924   BP: (!) 155/104 (!) 153/102 (!) 163/102 (!) 163/104   Pulse: 77 83 75 77   Resp:       Temp:       SpO2:       Weight:       Height:             Intake/Output Summary (Last 24 hours) at 1/2/2021 0927  Last data filed at 1/1/2021 1015  Gross per 24 hour   Intake --   Output 1400 ml   Net -1400 ml       Current Facility-Administered Medications   Medication Dose Route Frequency    heparin (porcine) 1,000 unit/mL injection 5,000 Units  5,000 Units Hemodialysis DIALYSIS PRN    amLODIPine (NORVASC) tablet 5 mg  5 mg Oral DAILY    midazolam (VERSED) injection 0.5-2 mg  0.5-2 mg IntraVENous Multiple    fentaNYL citrate (PF) injection 25-50 mcg  25-50 mcg IntraVENous Multiple    epoetin marianne-epbx (RETACRIT) injection 10,000 Units  10,000 Units SubCUTAneous Q7D    fentaNYL citrate (PF) injection 12.5-100 mcg  12.5-100 mcg IntraVENous Multiple    midazolam (VERSED) injection 0.25-2 mg  0.25-2 mg IntraVENous Multiple    diphenhydrAMINE (BENADRYL) injection 50 mg  50 mg IntraVENous Multiple    HYDROcodone-acetaminophen (NORCO)  mg tablet 1 Tab  1 Tab Oral Q6H PRN    labetaloL (NORMODYNE;TRANDATE) injection 20 mg  20 mg IntraVENous Q1H PRN    cloNIDine HCL (CATAPRES) tablet 0.2 mg  0.2 mg Oral BID    sevelamer carbonate (RENVELA) tab 800 mg  800 mg Oral TID WITH MEALS    labetaloL (NORMODYNE) tablet 300 mg  300 mg Oral TID    famotidine (PEPCID) tablet 20 mg  20 mg Oral DAILY    insulin lispro (HUMALOG) injection 0-10 Units  0-10 Units SubCUTAneous AC&HS    lip protectant (BLISTEX) ointment 1 Each  1 Each Topical PRN    alum-mag hydroxide-simeth (MYLANTA) oral suspension 30 mL  30 mL Oral Q4H PRN    heparin (porcine) injection 5,000 Units  5,000 Units SubCUTAneous Q8H    sodium chloride (NS) flush 5-40 mL  5-40 mL IntraVENous Q8H    sodium chloride (NS) flush 5-40 mL  5-40 mL IntraVENous PRN    acetaminophen (TYLENOL) tablet 650 mg  650 mg Oral Q6H PRN    Or    acetaminophen (TYLENOL) suppository 650 mg  650 mg Rectal Q6H PRN    polyethylene glycol (MIRALAX) packet 17 g  17 g Oral DAILY PRN    promethazine (PHENERGAN) tablet 12.5 mg  12.5 mg Oral Q6H PRN    hydrALAZINE (APRESOLINE) tablet 25 mg  25 mg Oral TID PRN       EXAM  GEN - Alert, oriented, in no distress  CV - S1, S2, Regular rate, no Rub   Lung - clear to auscultation bilaterally  Abd - soft, nontender, BS present  Ext - no edema  Access - right temp line- intact     Recent Labs     12/31/20  0609   WBC 9.7   HGB 9.6*   HCT 31.4*   *        Recent Labs     01/01/21  0525 12/31/20  0609    135*   K 4.5 4.7    101   CO2 29 30   BUN 43* 34*   CREA 11.00* 8.98*   CA 9.2 9.0   * 138*   PHOS 8.2* 6.5*       Assessment and Plan: JOSSELIN versus progression of CKD - No recent cr available for review other than one on oct 2019 with cr 0.9   serologies pending - will decide on need for biopsy pending serologies , most likely ESRD from Uncontrolled and untreated HTN and diabetes   UA shows hematuria and proteinuria   Pyelonephritis seen on CT done in out pt setting   Hepatitis and HIV negative   US kidney chronic echogenicity seen     1st dialysis 12/24    Outpatient slot pending  S/p renal biopsy 12/29 - with inflammation,collapsing glomerulopathy and hypertemsive changes. So not ESRD but high risk for ESRD.     TCC placed 12/30    Anemia-  Fe stats ok, on alie     HTN controlled, diagnosed about 15 years ago with hx of non compliance with meds     Hyperphosphatemia- on renvela     DM type II- per hosp A1C 10.2, pt was unaware of diabetes    Seen on HD well tolerated, Now TTS      Miguel Angel Morrow MD

## 2021-01-02 NOTE — PROGRESS NOTES
Hospitalist Note     Admit Date:  2020 10:57 PM   Name:  Dedrick Egan   Age:  37 y.o.  :  1985   MRN:  [id number]   PCP:  Ronit Garcia MD  Treatment Team: Attending Provider: Gera Wilkins MD; Consulting Provider: Ira Ramirez NP; Utilization Review: Robby Levine RN; Tech: [employer] [place]; Utilization Review: Mirta Galeano; Care Manager: Khurrma Stevenson [facility]; Medical Assistant: Savanna Plunkett RN; Student Nurse: Clementine Roque RN    HPI/Subjective:       Ms. Jaswant Cr is a 36 yo female with PMH of HTN, DM2, admitted with JOSSELIN. She has been seen by nephrology and required hemodialysis that likely will be needed longterm. TCC placed per IR. Completed course of empiric rocephin 7 days, urine cx skin jae. She is s/p renal biopsy path showing \"inflammation, collapsing glomerulopathy and hypertensive changes\", per nephrology she is not quite end stage disease but high risk. She is pending outpatient HD slot. Discharge is expected to home with mother.          21 ready to go home but still needs slot, some anorexia, no BM change, no dyspnea     Objective:     Patient Vitals for the past 24 hrs:   Temp Pulse Resp BP SpO2   21 1203 98.3 °F (36.8 °C) 96 18 (!) 153/98 97 %   21 1128 -- 84 -- (!) 175/100 --   21 1056 -- 80 -- (!) 163/109 --   21 1030 -- 85 -- (!) 166/112 --   21 1011 -- 73 -- (!) 161/109 --   21 0924 -- 77 -- (!) 163/104 --   21 0901 -- 75 -- (!) 163/102 --   21 0831 -- 83 -- (!) 153/102 --   21 0757 -- 77 -- (!) 155/104 --   21 0430 -- 89 -- (!) 148/90 --   21 0322 98.3 °F (36.8 °C) 85 -- (!) 156/92 98 %   21 2335 98 °F (36.7 °C) 76 19 138/88 98 %   21 1948 98.3 °F (36.8 °C) 86 19 (!) 159/95 99 %   21 1616 98.3 °F (36.8 °C) 85 18 130/80 99 %     Oxygen Therapy  O2 Sat (%): 97 % (21 1203)  Pulse via Oximetry: 84 beats per minute (20 1133)  O2 Device: Nasal cannula (12/30/20 0840)  O2 Flow Rate (L/min): 3 l/min (12/30/20 0840)  ETCO2 (mmHg): 42 mmHg (12/29/20 1054)    Estimated body mass index is 47.03 kg/m² as calculated from the following:    Height as of this encounter: 5' 5\" (1.651 m). Weight as of this encounter: 128.2 kg (282 lb 9.6 oz). Intake/Output Summary (Last 24 hours) at 1/2/2021 1246  Last data filed at 1/2/2021 1128  Gross per 24 hour   Intake --   Output 1300 ml   Net -1300 ml       *Note that automatically entered I/Os may not be accurate; dependent on patient compliance with collection and accurate  by techs. General:    Well nourished. Alert. No distress, obese  CV:   RRR. No murmur, rub, or gallop. No edema   Lungs:   CTAB. No wheezing, rhonchi, or rales. Abdomen:   Soft, nontender, nondistended. Obese, present BS  Extremities: Warm and dry. Skin:     No rashes or jaundice.    Neuro:  No gross focal deficits    Data Review:  I have reviewed all labs, meds, and studies from the last 24 hours:  Recent Results (from the past 24 hour(s))   GLUCOSE, POC    Collection Time: 01/01/21  4:15 PM   Result Value Ref Range    Glucose (POC) 139 (H) 65 - 100 mg/dL   GLUCOSE, POC    Collection Time: 01/01/21  7:52 PM   Result Value Ref Range    Glucose (POC) 130 (H) 65 - 100 mg/dL   GLUCOSE, POC    Collection Time: 01/02/21  7:07 AM   Result Value Ref Range    Glucose (POC) 135 (H) 65 - 100 mg/dL   GLUCOSE, POC    Collection Time: 01/02/21 12:02 PM   Result Value Ref Range    Glucose (POC) 116 (H) 65 - 100 mg/dL   PLEASE READ & DOCUMENT PPD TEST IN 48 HRS    Collection Time: 01/02/21 12:06 PM   Result Value Ref Range    PPD Negative Negative    mm Induration 0 0 - 5 mm        Current Meds:  Current Facility-Administered Medications   Medication Dose Route Frequency    heparin (porcine) 1,000 unit/mL injection 5,000 Units  5,000 Units Hemodialysis DIALYSIS PRN    amLODIPine (NORVASC) tablet 5 mg  5 mg Oral DAILY    midazolam (VERSED) injection 0.5-2 mg  0.5-2 mg IntraVENous Multiple    fentaNYL citrate (PF) injection 25-50 mcg  25-50 mcg IntraVENous Multiple    epoetin marianne-epbx (RETACRIT) injection 10,000 Units  10,000 Units SubCUTAneous Q7D    fentaNYL citrate (PF) injection 12.5-100 mcg  12.5-100 mcg IntraVENous Multiple    midazolam (VERSED) injection 0.25-2 mg  0.25-2 mg IntraVENous Multiple    diphenhydrAMINE (BENADRYL) injection 50 mg  50 mg IntraVENous Multiple    HYDROcodone-acetaminophen (NORCO)  mg tablet 1 Tab  1 Tab Oral Q6H PRN    labetaloL (NORMODYNE;TRANDATE) injection 20 mg  20 mg IntraVENous Q1H PRN    cloNIDine HCL (CATAPRES) tablet 0.2 mg  0.2 mg Oral BID    sevelamer carbonate (RENVELA) tab 800 mg  800 mg Oral TID WITH MEALS    labetaloL (NORMODYNE) tablet 300 mg  300 mg Oral TID    famotidine (PEPCID) tablet 20 mg  20 mg Oral DAILY    insulin lispro (HUMALOG) injection 0-10 Units  0-10 Units SubCUTAneous AC&HS    lip protectant (BLISTEX) ointment 1 Each  1 Each Topical PRN    alum-mag hydroxide-simeth (MYLANTA) oral suspension 30 mL  30 mL Oral Q4H PRN    heparin (porcine) injection 5,000 Units  5,000 Units SubCUTAneous Q8H    sodium chloride (NS) flush 5-40 mL  5-40 mL IntraVENous Q8H    sodium chloride (NS) flush 5-40 mL  5-40 mL IntraVENous PRN    acetaminophen (TYLENOL) tablet 650 mg  650 mg Oral Q6H PRN    Or    acetaminophen (TYLENOL) suppository 650 mg  650 mg Rectal Q6H PRN    polyethylene glycol (MIRALAX) packet 17 g  17 g Oral DAILY PRN    promethazine (PHENERGAN) tablet 12.5 mg  12.5 mg Oral Q6H PRN    hydrALAZINE (APRESOLINE) tablet 25 mg  25 mg Oral TID PRN       Other Studies:  No results found for this visit on 12/23/20. No results found.     All Micro Results     Procedure Component Value Units Date/Time    CULTURE, URINE [[id number]] Collected: 12/24/20 0016    Order Status: Completed Specimen: Cath Urine Updated: 12/26/20 0719     Special Requests: NO SPECIAL REQUESTS        Culture result:       >100,000 COLONIES/mL MIXED SKIN AZEEM ISOLATED                  THREE OR MORE TYPES OF ORGANISMS ARE PRESENT. THIS IS INDICATIVE OF CONTAMINATION DUE TO IMPROPER COLLECTION TECHNIQUE. PLEASE REPEAT COLLECTION UNLESS PATIENT HAS STARTED ANTIBIOTIC TREATMENT. SARS-CoV-2 Lab Results  \"Novel Coronavirus\" Test: No results found for: COV2NT   \"Emergent Disease\" Test: No results found for: EDPR  \"SARS-COV-2\" Test: No results found for: XGCOVT  Rapid Test:   Lab Results   Component Value Date/Time    COVR Not detected 12/24/2020 12:56 AM            Assessment and Plan:     Hospital Problems as of 1/2/2021 Never Reviewed          Codes Class Noted - Resolved POA    * (Principal) Acute renal failure on dialysis [facility]) ICD-10-CM: N17.9, Z99.2  ICD-9-CM: 584.9, V45.11  12/31/2020 - Present Yes        Acute renal failure (ARF) ([facility] [address] ICD-10-CM: N17.9  ICD-9-CM: 584.9  12/24/2020 - Present Unknown        Elevated procalcitonin ICD-10-CM: R79.89  ICD-9-CM: 790.99  12/24/2020 - Present Unknown        Obesity ICD-10-CM: E66.9  ICD-9-CM: 278.00  12/24/2020 - Present Unknown        Hx of essential hypertension ICD-10-CM: Z86.79  ICD-9-CM: V12.59  12/24/2020 - Present Unknown        Microcytic anemia ICD-10-CM: D50.9  ICD-9-CM: 280.9  12/24/2020 - Present Unknown              Plan:      · New onset JOSSELIN progressed to CKD, ESRD on new hemodialysis:  · can discharge once has HD slot  · TCC in place   · Completed course of empiric rocephin 7 days      · HTN:  · Continued norvasc, clonidine, labetalol      · DM2:  · On SSI  · DM2 education         DC planning/Dispo:   To home once has outpatient slot       Diet:  DIET RENAL  DVT ppx:  heparin    Signed:  Dariusz Pandya MD

## 2021-01-02 NOTE — DIALYSIS
TRANSFER IN - DIALYSIS    Received patient in dialysis unit  from room 714 (unit) for ordered procedure. Consent verified for renal replacement therapy. Patient alert and vital signs stable. /104 P 77    Hemodialysis initiated using right CVC. Aspirated and flushed both ports without difficulty. Dressing clean, dry and intact. Machine settings per MD order. Heparin 2000 unit bolus and 500 units/hr. Will monitor during treatment.

## 2021-01-03 LAB
ALBUMIN SERPL-MCNC: 2.1 G/DL (ref 3.5–5)
ANION GAP SERPL CALC-SCNC: 8 MMOL/L (ref 7–16)
BASOPHILS # BLD: 0.1 K/UL (ref 0–0.2)
BASOPHILS NFR BLD: 1 % (ref 0–2)
BUN SERPL-MCNC: 29 MG/DL (ref 6–23)
CALCIUM SERPL-MCNC: 9.2 MG/DL (ref 8.3–10.4)
CHLORIDE SERPL-SCNC: 101 MMOL/L (ref 98–107)
CO2 SERPL-SCNC: 28 MMOL/L (ref 21–32)
CREAT SERPL-MCNC: 7.06 MG/DL (ref 0.6–1)
DIFFERENTIAL METHOD BLD: ABNORMAL
EOSINOPHIL # BLD: 0.2 K/UL (ref 0–0.8)
EOSINOPHIL NFR BLD: 2 % (ref 0.5–7.8)
ERYTHROCYTE [DISTWIDTH] IN BLOOD BY AUTOMATED COUNT: 13.8 % (ref 11.9–14.6)
GLUCOSE BLD STRIP.AUTO-MCNC: 129 MG/DL (ref 65–100)
GLUCOSE BLD STRIP.AUTO-MCNC: 135 MG/DL (ref 65–100)
GLUCOSE BLD STRIP.AUTO-MCNC: 135 MG/DL (ref 65–100)
GLUCOSE BLD STRIP.AUTO-MCNC: 154 MG/DL (ref 65–100)
GLUCOSE SERPL-MCNC: 130 MG/DL (ref 65–100)
HCT VFR BLD AUTO: 29.3 % (ref 35.8–46.3)
HGB BLD-MCNC: 9 G/DL (ref 11.7–15.4)
IMM GRANULOCYTES # BLD AUTO: 0 K/UL (ref 0–0.5)
IMM GRANULOCYTES NFR BLD AUTO: 0 % (ref 0–5)
LYMPHOCYTES # BLD: 1.8 K/UL (ref 0.5–4.6)
LYMPHOCYTES NFR BLD: 23 % (ref 13–44)
MCH RBC QN AUTO: 25.3 PG (ref 26.1–32.9)
MCHC RBC AUTO-ENTMCNC: 30.7 G/DL (ref 31.4–35)
MCV RBC AUTO: 82.3 FL (ref 79.6–97.8)
MM INDURATION POC: 0 MM (ref 0–5)
MONOCYTES # BLD: 0.9 K/UL (ref 0.1–1.3)
MONOCYTES NFR BLD: 12 % (ref 4–12)
NEUTS SEG # BLD: 4.8 K/UL (ref 1.7–8.2)
NEUTS SEG NFR BLD: 62 % (ref 43–78)
NRBC # BLD: 0 K/UL (ref 0–0.2)
PHOSPHATE SERPL-MCNC: 5 MG/DL (ref 2.5–4.5)
PLATELET # BLD AUTO: 350 K/UL (ref 150–450)
PMV BLD AUTO: 9.3 FL (ref 9.4–12.3)
POTASSIUM SERPL-SCNC: 4 MMOL/L (ref 3.5–5.1)
PPD POC: NEGATIVE NEGATIVE
RBC # BLD AUTO: 3.56 M/UL (ref 4.05–5.2)
SODIUM SERPL-SCNC: 137 MMOL/L (ref 136–145)
WBC # BLD AUTO: 7.8 K/UL (ref 4.3–11.1)

## 2021-01-03 PROCEDURE — 85025 COMPLETE CBC W/AUTO DIFF WBC: CPT

## 2021-01-03 PROCEDURE — 82962 GLUCOSE BLOOD TEST: CPT

## 2021-01-03 PROCEDURE — 74011250637 HC RX REV CODE- 250/637: Performed by: HOSPITALIST

## 2021-01-03 PROCEDURE — 74011250636 HC RX REV CODE- 250/636: Performed by: STUDENT IN AN ORGANIZED HEALTH CARE EDUCATION/TRAINING PROGRAM

## 2021-01-03 PROCEDURE — 74011250637 HC RX REV CODE- 250/637: Performed by: INTERNAL MEDICINE

## 2021-01-03 PROCEDURE — 74011636637 HC RX REV CODE- 636/637: Performed by: STUDENT IN AN ORGANIZED HEALTH CARE EDUCATION/TRAINING PROGRAM

## 2021-01-03 PROCEDURE — 65660000000 HC RM CCU STEPDOWN

## 2021-01-03 PROCEDURE — 74011250637 HC RX REV CODE- 250/637: Performed by: NURSE PRACTITIONER

## 2021-01-03 PROCEDURE — 36415 COLL VENOUS BLD VENIPUNCTURE: CPT

## 2021-01-03 PROCEDURE — 80069 RENAL FUNCTION PANEL: CPT

## 2021-01-03 PROCEDURE — 74011250637 HC RX REV CODE- 250/637: Performed by: STUDENT IN AN ORGANIZED HEALTH CARE EDUCATION/TRAINING PROGRAM

## 2021-01-03 RX ORDER — AMOXICILLIN 250 MG
1 CAPSULE ORAL DAILY PRN
Status: DISCONTINUED | OUTPATIENT
Start: 2021-01-03 | End: 2021-01-11 | Stop reason: HOSPADM

## 2021-01-03 RX ADMIN — SEVELAMER CARBONATE 800 MG: 800 TABLET, FILM COATED ORAL at 07:44

## 2021-01-03 RX ADMIN — SENNOSIDES AND DOCUSATE SODIUM 1 TABLET: 8.6; 5 TABLET ORAL at 09:50

## 2021-01-03 RX ADMIN — INSULIN LISPRO 2 UNITS: 100 INJECTION, SOLUTION INTRAVENOUS; SUBCUTANEOUS at 17:17

## 2021-01-03 RX ADMIN — AMLODIPINE BESYLATE 5 MG: 5 TABLET ORAL at 07:44

## 2021-01-03 RX ADMIN — LABETALOL HYDROCHLORIDE 300 MG: 200 TABLET, FILM COATED ORAL at 21:48

## 2021-01-03 RX ADMIN — FAMOTIDINE 20 MG: 20 TABLET, FILM COATED ORAL at 07:43

## 2021-01-03 RX ADMIN — LABETALOL HYDROCHLORIDE 300 MG: 200 TABLET, FILM COATED ORAL at 07:43

## 2021-01-03 RX ADMIN — SEVELAMER CARBONATE 800 MG: 800 TABLET, FILM COATED ORAL at 11:41

## 2021-01-03 RX ADMIN — HEPARIN SODIUM 5000 UNITS: 5000 INJECTION INTRAVENOUS; SUBCUTANEOUS at 21:48

## 2021-01-03 RX ADMIN — Medication 10 ML: at 13:07

## 2021-01-03 RX ADMIN — CLONIDINE HYDROCHLORIDE 0.2 MG: 0.1 TABLET ORAL at 07:44

## 2021-01-03 RX ADMIN — SEVELAMER CARBONATE 800 MG: 800 TABLET, FILM COATED ORAL at 17:17

## 2021-01-03 RX ADMIN — HEPARIN SODIUM 5000 UNITS: 5000 INJECTION INTRAVENOUS; SUBCUTANEOUS at 12:45

## 2021-01-03 RX ADMIN — HEPARIN SODIUM 5000 UNITS: 5000 INJECTION INTRAVENOUS; SUBCUTANEOUS at 05:12

## 2021-01-03 RX ADMIN — Medication 10 ML: at 21:48

## 2021-01-03 RX ADMIN — LABETALOL HYDROCHLORIDE 300 MG: 200 TABLET, FILM COATED ORAL at 17:17

## 2021-01-03 RX ADMIN — CLONIDINE HYDROCHLORIDE 0.2 MG: 0.1 TABLET ORAL at 21:48

## 2021-01-03 RX ADMIN — Medication 10 ML: at 05:12

## 2021-01-03 NOTE — PROGRESS NOTES
[facility] PROGRESS NOTE    Follow up for: JOSSELIN     Subjective:   Patient seen and examined on rounds  Labs and chart reviewed- no sign of renal recovery     ROS:  Gen - no fever, no chills, appetite okay  CV - no chest pain, no orthopnea  Lung - no shortness of breath, no cough  Abd - no tenderness, no nausea, no vomiting  Ext - no edema    Objective:   Exam:  Vitals:    01/02/21 2001 01/02/21 2309 01/03/21 0529 01/03/21 0653   BP: 133/72 111/66 (!) 147/88 (!) 157/100   Pulse: 82 86 86 82   Resp: 18 18 18 18   Temp: 98.4 °F (36.9 °C) 98.6 °F (37 °C) 98.6 °F (37 °C) 98.3 °F (36.8 °C)   SpO2: 99% 97% 100% 99%   Weight:       Height:             Intake/Output Summary (Last 24 hours) at 1/3/2021 1054  Last data filed at 1/2/2021 1128  Gross per 24 hour   Intake --   Output 1300 ml   Net -1300 ml       Current Facility-Administered Medications   Medication Dose Route Frequency    senna-docusate (PERICOLACE) 8.6-50 mg per tablet 1 Tab  1 Tab Oral DAILY PRN    heparin (porcine) 1,000 unit/mL injection 5,000 Units  5,000 Units Hemodialysis DIALYSIS PRN    amLODIPine (NORVASC) tablet 5 mg  5 mg Oral DAILY    midazolam (VERSED) injection 0.5-2 mg  0.5-2 mg IntraVENous Multiple    fentaNYL citrate (PF) injection 25-50 mcg  25-50 mcg IntraVENous Multiple    epoetin marianne-epbx (RETACRIT) injection 10,000 Units  10,000 Units SubCUTAneous Q7D    fentaNYL citrate (PF) injection 12.5-100 mcg  12.5-100 mcg IntraVENous Multiple    midazolam (VERSED) injection 0.25-2 mg  0.25-2 mg IntraVENous Multiple    diphenhydrAMINE (BENADRYL) injection 50 mg  50 mg IntraVENous Multiple    HYDROcodone-acetaminophen (NORCO)  mg tablet 1 Tab  1 Tab Oral Q6H PRN    labetaloL (NORMODYNE;TRANDATE) injection 20 mg  20 mg IntraVENous Q1H PRN    cloNIDine HCL (CATAPRES) tablet 0.2 mg  0.2 mg Oral BID    sevelamer carbonate (RENVELA) tab 800 mg  800 mg Oral TID WITH MEALS    labetaloL (NORMODYNE) tablet 300 mg  300 mg Oral TID  famotidine (PEPCID) tablet 20 mg  20 mg Oral DAILY    insulin lispro (HUMALOG) injection 0-10 Units  0-10 Units SubCUTAneous AC&HS    lip protectant (BLISTEX) ointment 1 Each  1 Each Topical PRN    alum-mag hydroxide-simeth (MYLANTA) oral suspension 30 mL  30 mL Oral Q4H PRN    heparin (porcine) injection 5,000 Units  5,000 Units SubCUTAneous Q8H    sodium chloride (NS) flush 5-40 mL  5-40 mL IntraVENous Q8H    sodium chloride (NS) flush 5-40 mL  5-40 mL IntraVENous PRN    acetaminophen (TYLENOL) tablet 650 mg  650 mg Oral Q6H PRN    Or    acetaminophen (TYLENOL) suppository 650 mg  650 mg Rectal Q6H PRN    polyethylene glycol (MIRALAX) packet 17 g  17 g Oral DAILY PRN    promethazine (PHENERGAN) tablet 12.5 mg  12.5 mg Oral Q6H PRN    hydrALAZINE (APRESOLINE) tablet 25 mg  25 mg Oral TID PRN       EXAM  GEN - Alert, oriented, in no distress  CV - S1, S2, Regular rate, no Rub   Lung - clear to auscultation bilaterally  Abd - soft, nontender, BS present  Ext - no edema  Access - right temp line- intact     Recent Labs     01/03/21  0743   WBC 7.8   HGB 9.0*   HCT 29.3*           Recent Labs     01/03/21  0743 01/02/21  1312 01/01/21  0525    136 136   K 4.0 4.2 4.5    101 100   CO2 28 28 29   BUN 29* 33* 43*   CREA 7.06* 8.75* 11.00*   CA 9.2 9.1 9.2   * 129* 129*   PHOS 5.0* 6.4* 8.2*       Assessment and Plan:      JOSSELIN versus progression of CKD - No recent cr available for review other than one on oct 2019 with cr 0.9   serologies pending - will decide on need for biopsy pending serologies , most likely ESRD from Uncontrolled and untreated HTN and diabetes   UA shows hematuria and proteinuria   Pyelonephritis seen on CT done in out pt setting   Hepatitis and HIV negative   US kidney chronic echogenicity seen     1st dialysis 12/24    Outpatient slot pending  S/p renal biopsy 12/29 - with inflammation,collapsing glomerulopathy (associated with APOL1) and significant hypertemsive changes. So not ESRD but high risk for ESRD.     TCC placed 12/30    Anemia-  Fe stats ok, on alie     HTN controlled, diagnosed about 15 years ago with hx of non compliance with meds     Hyperphosphatemia- on renvela     DM type II- per hosp A1C 10.2, pt was unaware of diabetes    Now TTS      Fang Conley MD

## 2021-01-03 NOTE — PROGRESS NOTES
Problem: Falls - Risk of  Goal: *Absence of Falls  Description: Document Kaz Clay Fall Risk and appropriate interventions in the flowsheet. Outcome: Progressing Towards Goal  Note: Fall Risk Interventions:            Medication Interventions: Patient to call before getting OOB, Teach patient to arise slowly    Elimination Interventions: Call light in reach, Patient to call for help with toileting needs, Stay With Me (per policy), Toilet paper/wipes in reach, Toileting schedule/hourly rounds              Problem: Patient Education: Go to Patient Education Activity  Goal: Patient/Family Education  Outcome: Progressing Towards Goal     Problem: Diabetes Self-Management  Goal: *Disease process and treatment process  Description: Define diabetes and identify own type of diabetes; list 3 options for treating diabetes. Outcome: Progressing Towards Goal  Goal: *Incorporating nutritional management into lifestyle  Description: Describe effect of type, amount and timing of food on blood glucose; list 3 methods for planning meals. Outcome: Progressing Towards Goal  Goal: *Incorporating physical activity into lifestyle  Description: State effect of exercise on blood glucose levels. Outcome: Progressing Towards Goal  Goal: *Developing strategies to promote health/change behavior  Description: Define the ABC's of diabetes; identify appropriate screenings, schedule and personal plan for screenings. Outcome: Progressing Towards Goal  Goal: *Using medications safely  Description: State effect of diabetes medications on diabetes; name diabetes medication taking, action and side effects. Outcome: Progressing Towards Goal  Goal: *Monitoring blood glucose, interpreting and using results  Description: Identify recommended blood glucose targets  and personal targets.   Outcome: Progressing Towards Goal  Goal: *Prevention, detection, treatment of acute complications  Description: List symptoms of hyper- and hypoglycemia; describe how to treat low blood sugar and actions for lowering  high blood glucose level. Outcome: Progressing Towards Goal  Goal: *Prevention, detection and treatment of chronic complications  Description: Define the natural course of diabetes and describe the relationship of blood glucose levels to long term complications of diabetes. Outcome: Progressing Towards Goal  Goal: *Developing strategies to address psychosocial issues  Description: Describe feelings about living with diabetes; identify support needed and support network  Outcome: Progressing Towards Goal  Goal: *Insulin pump training  Outcome: Progressing Towards Goal  Goal: *Sick day guidelines  Outcome: Progressing Towards Goal  Goal: *Patient Specific Goal (EDIT GOAL, INSERT TEXT)  Outcome: Progressing Towards Goal     Problem: Patient Education: Go to Patient Education Activity  Goal: Patient/Family Education  Outcome: Progressing Towards Goal     Problem: Acute Renal Failure: Discharge Outcomes  Goal: *Optimal pain control at patient's stated goal  Outcome: Progressing Towards Goal  Goal: *Urinary output within identified parameters  Outcome: Progressing Towards Goal  Goal: *Hemodynamically stable  Outcome: Progressing Towards Goal  Goal: *Tolerating diet  Outcome: Progressing Towards Goal  Goal: *Lab values stabilized  Outcome: Progressing Towards Goal  Goal: *Verbalizes understanding and describes medication purposes and frequencies  Outcome: Progressing Towards Goal  Goal: *Medication reconciliation  Outcome: Progressing Towards Goal     Problem: Pressure Injury - Risk of  Goal: *Prevention of pressure injury  Description: Document Oneal Scale and appropriate interventions in the flowsheet.   Outcome: Progressing Towards Goal     Problem: Patient Education: Go to Patient Education Activity  Goal: Patient/Family Education  Outcome: Progressing Towards Goal

## 2021-01-03 NOTE — PROGRESS NOTES
Hospitalist Note     Admit Date:  2020 10:57 PM   Name:  Marlo Herrera   Age:  37 y.o.  :  1985   MRN:  [id number]   PCP:  Blanca Alves MD  Treatment Team: Attending Provider: Nikki Lezama MD; Consulting Provider: Sukumar Landry NP; Utilization Review: Terrell Cabello RN; Tech: Qpyn; Utilization Review: Zaida Kat; Care Manager: Sorin Reyez LM; Medical Assistant: Katie Hastings RN; Charge Nurse: Lázaro Dodd    HPI/Subjective:       Ms. Rosie Dawson is a 38 yo female with PMH of HTN, DM2, admitted with JOSSELIN. She has been seen by nephrology and required hemodialysis that likely will be needed longterm. TCC placed per IR. Completed course of empiric rocephin 7 days, urine cx skin jae. She is s/p renal biopsy path showing \"inflammation, collapsing glomerulopathy and hypertensive changes\", per nephrology she is not quite end stage disease but high risk. She is pending outpatient HD slot. Discharge is expected to home with mother. 23 ready to go home but still needs slot, some anorexia, no BM change, no dyspnea     1/3/21 patient sitting up in chair this morning. Feeling well. Currently awaiting outpatient hemodialysis slot. No chest pain no palpitations.     Objective:     Patient Vitals for the past 24 hrs:   Temp Pulse Resp BP SpO2   21 1642 98.2 °F (36.8 °C) (!) 103 16 (!) 155/91 97 %   21 1113 97.3 °F (36.3 °C) 76 18 (!) 153/94 95 %   21 0653 98.3 °F (36.8 °C) 82 18 (!) 157/100 99 %   21 0529 98.6 °F (37 °C) 86 18 (!) 147/88 100 %   21 2309 98.6 °F (37 °C) 86 18 111/66 97 %   21 2001 98.4 °F (36.9 °C) 82 18 133/72 99 %     Oxygen Therapy  O2 Sat (%): 97 % (21 1642)  Pulse via Oximetry: 84 beats per minute (20 1133)  O2 Device: Nasal cannula (20 0840)  O2 Flow Rate (L/min): 3 l/min (20 0840)  ETCO2 (mmHg): 42 mmHg (20 1054)    Estimated body mass index is 47.03 kg/m² as calculated from the following:    Height as of this encounter: 5' 5\" (1.651 m). Weight as of this encounter: 128.2 kg (282 lb 9.6 oz). No intake or output data in the 24 hours ending 01/03/21 1653    *Note that automatically entered I/Os may not be accurate; dependent on patient compliance with collection and accurate  by techs. General:    Well nourished. Alert. No distress, obese  CV:   RRR. No murmur, rub, or gallop. No edema   Lungs:   CTAB. No wheezing, rhonchi, or rales. Abdomen:   Soft, nontender, nondistended. Obese, present BS  Extremities: Warm and dry. Skin:     No rashes or jaundice.    Neuro:  No gross focal deficits    Data Review:  I have reviewed all labs, meds, and studies from the last 24 hours:  Recent Results (from the past 24 hour(s))   GLUCOSE, POC    Collection Time: 01/02/21  9:26 PM   Result Value Ref Range    Glucose (POC) 125 (H) 65 - 100 mg/dL   GLUCOSE, POC    Collection Time: 01/03/21  6:55 AM   Result Value Ref Range    Glucose (POC) 135 (H) 65 - 100 mg/dL   RENAL FUNCTION PANEL    Collection Time: 01/03/21  7:43 AM   Result Value Ref Range    Sodium 137 136 - 145 mmol/L    Potassium 4.0 3.5 - 5.1 mmol/L    Chloride 101 98 - 107 mmol/L    CO2 28 21 - 32 mmol/L    Anion gap 8 7 - 16 mmol/L    Glucose 130 (H) 65 - 100 mg/dL    BUN 29 (H) 6 - 23 MG/DL    Creatinine 7.06 (H) 0.6 - 1.0 MG/DL    GFR est AA 9 (L) >60 ml/min/1.73m2    GFR est non-AA 7 (L) >60 ml/min/1.73m2    Calcium 9.2 8.3 - 10.4 MG/DL    Phosphorus 5.0 (H) 2.5 - 4.5 MG/DL    Albumin 2.1 (L) 3.5 - 5.0 g/dL   CBC WITH AUTOMATED DIFF    Collection Time: 01/03/21  7:43 AM   Result Value Ref Range    WBC 7.8 4.3 - 11.1 K/uL    RBC 3.56 (L) 4.05 - 5.2 M/uL    HGB 9.0 (L) 11.7 - 15.4 g/dL    HCT 29.3 (L) 35.8 - 46.3 %    MCV 82.3 79.6 - 97.8 FL    MCH 25.3 (L) 26.1 - 32.9 PG    MCHC 30.7 (L) 31.4 - 35.0 g/dL    RDW 13.8 11.9 - 14.6 %    PLATELET 859 716 - 088 K/uL    MPV 9.3 (L) 9.4 - 12.3 FL ABSOLUTE NRBC 0.00 0.0 - 0.2 K/uL    DF AUTOMATED      NEUTROPHILS 62 43 - 78 %    LYMPHOCYTES 23 13 - 44 %    MONOCYTES 12 4.0 - 12.0 %    EOSINOPHILS 2 0.5 - 7.8 %    BASOPHILS 1 0.0 - 2.0 %    IMMATURE GRANULOCYTES 0 0.0 - 5.0 %    ABS. NEUTROPHILS 4.8 1.7 - 8.2 K/UL    ABS. LYMPHOCYTES 1.8 0.5 - 4.6 K/UL    ABS. MONOCYTES 0.9 0.1 - 1.3 K/UL    ABS. EOSINOPHILS 0.2 0.0 - 0.8 K/UL    ABS. BASOPHILS 0.1 0.0 - 0.2 K/UL    ABS. IMM.  GRANS. 0.0 0.0 - 0.5 K/UL   GLUCOSE, POC    Collection Time: 01/03/21 11:14 AM   Result Value Ref Range    Glucose (POC) 135 (H) 65 - 100 mg/dL   PLEASE READ & DOCUMENT PPD TEST IN 72 HRS    Collection Time: 01/03/21 11:45 AM   Result Value Ref Range    PPD Negative Negative    mm Induration 0 0 - 5 mm        Current Meds:  Current Facility-Administered Medications   Medication Dose Route Frequency    senna-docusate (PERICOLACE) 8.6-50 mg per tablet 1 Tab  1 Tab Oral DAILY PRN    heparin (porcine) 1,000 unit/mL injection 5,000 Units  5,000 Units Hemodialysis DIALYSIS PRN    amLODIPine (NORVASC) tablet 5 mg  5 mg Oral DAILY    midazolam (VERSED) injection 0.5-2 mg  0.5-2 mg IntraVENous Multiple    fentaNYL citrate (PF) injection 25-50 mcg  25-50 mcg IntraVENous Multiple    epoetin marianne-epbx (RETACRIT) injection 10,000 Units  10,000 Units SubCUTAneous Q7D    fentaNYL citrate (PF) injection 12.5-100 mcg  12.5-100 mcg IntraVENous Multiple    midazolam (VERSED) injection 0.25-2 mg  0.25-2 mg IntraVENous Multiple    diphenhydrAMINE (BENADRYL) injection 50 mg  50 mg IntraVENous Multiple    HYDROcodone-acetaminophen (NORCO)  mg tablet 1 Tab  1 Tab Oral Q6H PRN    labetaloL (NORMODYNE;TRANDATE) injection 20 mg  20 mg IntraVENous Q1H PRN    cloNIDine HCL (CATAPRES) tablet 0.2 mg  0.2 mg Oral BID    sevelamer carbonate (RENVELA) tab 800 mg  800 mg Oral TID WITH MEALS    labetaloL (NORMODYNE) tablet 300 mg  300 mg Oral TID    famotidine (PEPCID) tablet 20 mg  20 mg Oral DAILY    insulin lispro (HUMALOG) injection 0-10 Units  0-10 Units SubCUTAneous AC&HS    lip protectant (BLISTEX) ointment 1 Each  1 Each Topical PRN    alum-mag hydroxide-simeth (MYLANTA) oral suspension 30 mL  30 mL Oral Q4H PRN    heparin (porcine) injection 5,000 Units  5,000 Units SubCUTAneous Q8H    sodium chloride (NS) flush 5-40 mL  5-40 mL IntraVENous Q8H    sodium chloride (NS) flush 5-40 mL  5-40 mL IntraVENous PRN    acetaminophen (TYLENOL) tablet 650 mg  650 mg Oral Q6H PRN    Or    acetaminophen (TYLENOL) suppository 650 mg  650 mg Rectal Q6H PRN    polyethylene glycol (MIRALAX) packet 17 g  17 g Oral DAILY PRN    promethazine (PHENERGAN) tablet 12.5 mg  12.5 mg Oral Q6H PRN    hydrALAZINE (APRESOLINE) tablet 25 mg  25 mg Oral TID PRN       Other Studies:  No results found for this visit on 12/23/20. No results found. All Micro Results     Procedure Component Value Units Date/Time    CULTURE, URINE [[id number]] Collected: 12/24/20 0016    Order Status: Completed Specimen: Cath Urine Updated: 12/26/20 0719     Special Requests: NO SPECIAL REQUESTS        Culture result:       >100,000 COLONIES/mL MIXED SKIN AZEEM ISOLATED                  THREE OR MORE TYPES OF ORGANISMS ARE PRESENT. THIS IS INDICATIVE OF CONTAMINATION DUE TO IMPROPER COLLECTION TECHNIQUE. PLEASE REPEAT COLLECTION UNLESS PATIENT HAS STARTED ANTIBIOTIC TREATMENT.                 SARS-CoV-2 Lab Results  \"Novel Coronavirus\" Test: No results found for: COV2NT   \"Emergent Disease\" Test: No results found for: EDPR  \"SARS-COV-2\" Test: No results found for: XGCOVT  Rapid Test:   Lab Results   Component Value Date/Time    COVR Not detected 12/24/2020 12:56 AM            Assessment and Plan:     Hospital Problems as of 1/3/2021 Never Reviewed          Codes Class Noted - Resolved POA    * (Principal) Acute renal failure on dialysis [facility]) ICD-10-CM: N17.9, Z99.2  ICD-9-CM: 584.9, V45.11  12/31/2020 - Present Yes        Acute renal failure (ARF) ([facility] [address] ICD-10-CM: N17.9  ICD-9-CM: 584.9  12/24/2020 - Present Unknown        Elevated procalcitonin ICD-10-CM: R79.89  ICD-9-CM: 790.99  12/24/2020 - Present Unknown        Obesity ICD-10-CM: E66.9  ICD-9-CM: 278.00  12/24/2020 - Present Unknown        Hx of essential hypertension ICD-10-CM: Z86.79  ICD-9-CM: V12.59  12/24/2020 - Present Unknown        Microcytic anemia ICD-10-CM: D50.9  ICD-9-CM: 280.9  12/24/2020 - Present Unknown              Plan:      · New onset JOSSELIN progressed to CKD, high risk for ESRD-  on new hemodialysis:  · can discharge once has HD slot  · TCC in place   · Completed course of empiric rocephin 7 days      · HTN:  · Continued norvasc, clonidine, labetalol      · DM2:  · On SSI  · DM2 education         DC planning/Dispo:   To home once has outpatient hemodialysis slot       Diet:  DIET RENAL  DVT ppx:  heparin    Signed:  Juliet Merlos MD

## 2021-01-04 LAB
ALBUMIN SERPL-MCNC: 2 G/DL (ref 3.5–5)
ANION GAP SERPL CALC-SCNC: 7 MMOL/L (ref 7–16)
BUN SERPL-MCNC: 36 MG/DL (ref 6–23)
CALCIUM SERPL-MCNC: 9.4 MG/DL (ref 8.3–10.4)
CHLORIDE SERPL-SCNC: 102 MMOL/L (ref 98–107)
CO2 SERPL-SCNC: 28 MMOL/L (ref 21–32)
CREAT SERPL-MCNC: 8.69 MG/DL (ref 0.6–1)
GLUCOSE BLD STRIP.AUTO-MCNC: 109 MG/DL (ref 65–100)
GLUCOSE BLD STRIP.AUTO-MCNC: 118 MG/DL (ref 65–100)
GLUCOSE BLD STRIP.AUTO-MCNC: 151 MG/DL (ref 65–100)
GLUCOSE BLD STRIP.AUTO-MCNC: 176 MG/DL (ref 65–100)
GLUCOSE SERPL-MCNC: 123 MG/DL (ref 65–100)
PHOSPHATE SERPL-MCNC: 6 MG/DL (ref 2.5–4.5)
POTASSIUM SERPL-SCNC: 4.4 MMOL/L (ref 3.5–5.1)
SODIUM SERPL-SCNC: 137 MMOL/L (ref 136–145)

## 2021-01-04 PROCEDURE — 74011636637 HC RX REV CODE- 636/637: Performed by: STUDENT IN AN ORGANIZED HEALTH CARE EDUCATION/TRAINING PROGRAM

## 2021-01-04 PROCEDURE — 82962 GLUCOSE BLOOD TEST: CPT

## 2021-01-04 PROCEDURE — 74011250637 HC RX REV CODE- 250/637: Performed by: INTERNAL MEDICINE

## 2021-01-04 PROCEDURE — 65660000000 HC RM CCU STEPDOWN

## 2021-01-04 PROCEDURE — 80069 RENAL FUNCTION PANEL: CPT

## 2021-01-04 PROCEDURE — 36415 COLL VENOUS BLD VENIPUNCTURE: CPT

## 2021-01-04 PROCEDURE — 74011250637 HC RX REV CODE- 250/637: Performed by: NURSE PRACTITIONER

## 2021-01-04 PROCEDURE — 74011250637 HC RX REV CODE- 250/637: Performed by: STUDENT IN AN ORGANIZED HEALTH CARE EDUCATION/TRAINING PROGRAM

## 2021-01-04 PROCEDURE — 2709999900 HC NON-CHARGEABLE SUPPLY

## 2021-01-04 PROCEDURE — 74011250636 HC RX REV CODE- 250/636: Performed by: STUDENT IN AN ORGANIZED HEALTH CARE EDUCATION/TRAINING PROGRAM

## 2021-01-04 RX ORDER — HYDRALAZINE HYDROCHLORIDE 50 MG/1
50 TABLET, FILM COATED ORAL
Status: DISCONTINUED | OUTPATIENT
Start: 2021-01-04 | End: 2021-01-11 | Stop reason: HOSPADM

## 2021-01-04 RX ADMIN — AMLODIPINE BESYLATE 5 MG: 5 TABLET ORAL at 08:25

## 2021-01-04 RX ADMIN — Medication 10 ML: at 14:00

## 2021-01-04 RX ADMIN — Medication 10 ML: at 05:59

## 2021-01-04 RX ADMIN — LABETALOL HYDROCHLORIDE 300 MG: 200 TABLET, FILM COATED ORAL at 21:17

## 2021-01-04 RX ADMIN — SEVELAMER CARBONATE 800 MG: 800 TABLET, FILM COATED ORAL at 08:25

## 2021-01-04 RX ADMIN — LABETALOL HYDROCHLORIDE 300 MG: 200 TABLET, FILM COATED ORAL at 08:25

## 2021-01-04 RX ADMIN — INSULIN LISPRO 2 UNITS: 100 INJECTION, SOLUTION INTRAVENOUS; SUBCUTANEOUS at 16:47

## 2021-01-04 RX ADMIN — FAMOTIDINE 20 MG: 20 TABLET, FILM COATED ORAL at 08:25

## 2021-01-04 RX ADMIN — LABETALOL HYDROCHLORIDE 300 MG: 200 TABLET, FILM COATED ORAL at 15:34

## 2021-01-04 RX ADMIN — HYDRALAZINE HYDROCHLORIDE 25 MG: 50 TABLET, FILM COATED ORAL at 15:34

## 2021-01-04 RX ADMIN — SEVELAMER CARBONATE 800 MG: 800 TABLET, FILM COATED ORAL at 13:58

## 2021-01-04 RX ADMIN — HEPARIN SODIUM 5000 UNITS: 5000 INJECTION INTRAVENOUS; SUBCUTANEOUS at 13:52

## 2021-01-04 RX ADMIN — CLONIDINE HYDROCHLORIDE 0.2 MG: 0.1 TABLET ORAL at 21:17

## 2021-01-04 RX ADMIN — Medication 10 ML: at 21:23

## 2021-01-04 RX ADMIN — SEVELAMER CARBONATE 800 MG: 800 TABLET, FILM COATED ORAL at 16:47

## 2021-01-04 RX ADMIN — INSULIN LISPRO 2 UNITS: 100 INJECTION, SOLUTION INTRAVENOUS; SUBCUTANEOUS at 08:26

## 2021-01-04 RX ADMIN — HEPARIN SODIUM 5000 UNITS: 5000 INJECTION INTRAVENOUS; SUBCUTANEOUS at 21:17

## 2021-01-04 RX ADMIN — HEPARIN SODIUM 5000 UNITS: 5000 INJECTION INTRAVENOUS; SUBCUTANEOUS at 05:58

## 2021-01-04 RX ADMIN — CLONIDINE HYDROCHLORIDE 0.2 MG: 0.1 TABLET ORAL at 08:25

## 2021-01-04 NOTE — PROGRESS NOTES
Hourly rounds completed this shift, will give report to oncoming nurse. Pt resting in bed, call light within reach.

## 2021-01-04 NOTE — PROGRESS NOTES
Hospitalist Note     Admit Date:  2020 10:57 PM   Name:  Morris Last   Age:  37 y.o.  :  1985   MRN:  [id number]   PCP:  Carmen Jones MD  Treatment Team: Attending Provider: Erik Torrez MD; Consulting Provider: Leona Valdez NP; Utilization Review: Jayson Ormond, RN; Tech: Nataliia Momin; Utilization Review: Rod Aponte; Care Manager: [facility]och[facility] [place]; Medical Assistant: Daina Richter RN; Charge Nurse: Katy Case; Utilization Review: Rachel Shelby    HPI/Subjective:       Ms. Linwood Sam is a 36 yo female with PMH of HTN, DM2, admitted with JOSSELIN. She has been seen by nephrology and required hemodialysis that likely will be needed longterm. TCC placed per IR. Completed course of empiric rocephin 7 days, urine cx skin jae. She is s/p renal biopsy path showing \"inflammation, collapsing glomerulopathy and hypertensive changes\", per nephrology she is not quite end stage disease but high risk. She is pending outpatient HD slot. Discharge is expected to home with mother. 21 ready to go home but still needs slot, some anorexia, no BM change, no dyspnea     1/3/21 patient sitting up in chair this morning. Feeling well. Currently awaiting outpatient hemodialysis slot. No chest pain no palpitations. 21 patient is doing well. No fever. No chest pain no palpitations. No nausea no vomiting. No abdominal pain. Currently awaiting outpatient hemodialysis slot.     Objective:     Patient Vitals for the past 24 hrs:   Temp Pulse Resp BP SpO2   21 1148 98 °F (36.7 °C) 72 18 (!) 167/99 100 %   21 0817 97.6 °F (36.4 °C) 76 18 (!) 160/93 97 %   21 0544 97.8 °F (36.6 °C) 76 18 (!) 147/91 100 %   21 0037 97.9 °F (36.6 °C) 78 18 135/88 98 %   21 2124 97.5 °F (36.4 °C) 73 18 (!) 157/101 99 %   21 1642 98.2 °F (36.8 °C) (!) 103 16 (!) 155/91 97 %     Oxygen Therapy  O2 Sat (%): 100 % (21 1148)  Pulse via Oximetry: 84 beats per minute (12/29/20 1133)  O2 Device: Nasal cannula (12/30/20 0840)  O2 Flow Rate (L/min): 3 l/min (12/30/20 0840)  ETCO2 (mmHg): 42 mmHg (12/29/20 1054)    Estimated body mass index is 47.03 kg/m² as calculated from the following:    Height as of this encounter: 5' 5\" (1.651 m). Weight as of this encounter: 128.2 kg (282 lb 9.6 oz). No intake or output data in the 24 hours ending 01/04/21 1231    *Note that automatically entered I/Os may not be accurate; dependent on patient compliance with collection and accurate  by techs. General:    Well nourished. Alert. No distress, obese  CV:   RRR. No murmur, rub, or gallop. No edema   Lungs:   CTAB. No wheezing, rhonchi, or rales. Abdomen:   Soft, nontender, nondistended. Obese, present BS  Extremities: Warm and dry. Skin:     No rashes or jaundice.    Neuro:  No gross focal deficits    Data Review:  I have reviewed all labs, meds, and studies from the last 24 hours:  Recent Results (from the past 24 hour(s))   GLUCOSE, POC    Collection Time: 01/03/21  4:41 PM   Result Value Ref Range    Glucose (POC) 154 (H) 65 - 100 mg/dL   GLUCOSE, POC    Collection Time: 01/03/21  9:27 PM   Result Value Ref Range    Glucose (POC) 129 (H) 65 - 100 mg/dL   RENAL FUNCTION PANEL    Collection Time: 01/04/21  5:27 AM   Result Value Ref Range    Sodium 137 136 - 145 mmol/L    Potassium 4.4 3.5 - 5.1 mmol/L    Chloride 102 98 - 107 mmol/L    CO2 28 21 - 32 mmol/L    Anion gap 7 7 - 16 mmol/L    Glucose 123 (H) 65 - 100 mg/dL    BUN 36 (H) 6 - 23 MG/DL    Creatinine 8.69 (H) 0.6 - 1.0 MG/DL    GFR est AA 7 (L) >60 ml/min/1.73m2    GFR est non-AA 6 (L) >60 ml/min/1.73m2    Calcium 9.4 8.3 - 10.4 MG/DL    Phosphorus 6.0 (H) 2.5 - 4.5 MG/DL    Albumin 2.0 (L) 3.5 - 5.0 g/dL   GLUCOSE, POC    Collection Time: 01/04/21  8:11 AM   Result Value Ref Range    Glucose (POC) 176 (H) 65 - 100 mg/dL   GLUCOSE, POC    Collection Time: 01/04/21 11:47 AM   Result Value Ref Range    Glucose (POC) 109 (H) 65 - 100 mg/dL        Current Meds:  Current Facility-Administered Medications   Medication Dose Route Frequency    senna-docusate (PERICOLACE) 8.6-50 mg per tablet 1 Tab  1 Tab Oral DAILY PRN    heparin (porcine) 1,000 unit/mL injection 5,000 Units  5,000 Units Hemodialysis DIALYSIS PRN    amLODIPine (NORVASC) tablet 5 mg  5 mg Oral DAILY    midazolam (VERSED) injection 0.5-2 mg  0.5-2 mg IntraVENous Multiple    fentaNYL citrate (PF) injection 25-50 mcg  25-50 mcg IntraVENous Multiple    epoetin marianne-epbx (RETACRIT) injection 10,000 Units  10,000 Units SubCUTAneous Q7D    fentaNYL citrate (PF) injection 12.5-100 mcg  12.5-100 mcg IntraVENous Multiple    midazolam (VERSED) injection 0.25-2 mg  0.25-2 mg IntraVENous Multiple    diphenhydrAMINE (BENADRYL) injection 50 mg  50 mg IntraVENous Multiple    HYDROcodone-acetaminophen (NORCO)  mg tablet 1 Tab  1 Tab Oral Q6H PRN    labetaloL (NORMODYNE;TRANDATE) injection 20 mg  20 mg IntraVENous Q1H PRN    cloNIDine HCL (CATAPRES) tablet 0.2 mg  0.2 mg Oral BID    sevelamer carbonate (RENVELA) tab 800 mg  800 mg Oral TID WITH MEALS    labetaloL (NORMODYNE) tablet 300 mg  300 mg Oral TID    famotidine (PEPCID) tablet 20 mg  20 mg Oral DAILY    insulin lispro (HUMALOG) injection 0-10 Units  0-10 Units SubCUTAneous AC&HS    lip protectant (BLISTEX) ointment 1 Each  1 Each Topical PRN    alum-mag hydroxide-simeth (MYLANTA) oral suspension 30 mL  30 mL Oral Q4H PRN    heparin (porcine) injection 5,000 Units  5,000 Units SubCUTAneous Q8H    sodium chloride (NS) flush 5-40 mL  5-40 mL IntraVENous Q8H    sodium chloride (NS) flush 5-40 mL  5-40 mL IntraVENous PRN    acetaminophen (TYLENOL) tablet 650 mg  650 mg Oral Q6H PRN    Or    acetaminophen (TYLENOL) suppository 650 mg  650 mg Rectal Q6H PRN    polyethylene glycol (MIRALAX) packet 17 g  17 g Oral DAILY PRN    promethazine (PHENERGAN) tablet 12.5 mg  12.5 mg Oral Q6H PRN    hydrALAZINE (APRESOLINE) tablet 25 mg  25 mg Oral TID PRN       Other Studies:  No results found for this visit on 12/23/20. No results found. All Micro Results     Procedure Component Value Units Date/Time    CULTURE, URINE [[id number]] Collected: 12/24/20 0016    Order Status: Completed Specimen: Cath Urine Updated: 12/26/20 0719     Special Requests: NO SPECIAL REQUESTS        Culture result:       >100,000 COLONIES/mL MIXED SKIN AZEEM ISOLATED                  THREE OR MORE TYPES OF ORGANISMS ARE PRESENT. THIS IS INDICATIVE OF CONTAMINATION DUE TO IMPROPER COLLECTION TECHNIQUE. PLEASE REPEAT COLLECTION UNLESS PATIENT HAS STARTED ANTIBIOTIC TREATMENT.                 SARS-CoV-2 Lab Results  \"Novel Coronavirus\" Test: No results found for: COV2NT   \"Emergent Disease\" Test: No results found for: EDPR  \"SARS-COV-2\" Test: No results found for: XGCOVT  Rapid Test:   Lab Results   Component Value Date/Time    COVR Not detected 12/24/2020 12:56 AM            Assessment and Plan:     Hospital Problems as of 1/4/2021 Never Reviewed          Codes Class Noted - Resolved POA    * (Principal) Acute renal failure on dialysis [facility]) ICD-10-CM: N17.9, Z99.2  ICD-9-CM: 584.9, V45.11  12/31/2020 - Present Yes        Acute renal failure (ARF) ([facility] [address] ICD-10-CM: N17.9  ICD-9-CM: 584.9  12/24/2020 - Present Unknown        Elevated procalcitonin ICD-10-CM: R79.89  ICD-9-CM: 790.99  12/24/2020 - Present Unknown        Obesity ICD-10-CM: E66.9  ICD-9-CM: 278.00  12/24/2020 - Present Unknown        Hx of essential hypertension ICD-10-CM: Z86.79  ICD-9-CM: V12.59  12/24/2020 - Present Unknown        Microcytic anemia ICD-10-CM: D50.9  ICD-9-CM: 280.9  12/24/2020 - Present Unknown              Plan:      · New onset JOSSELIN progressed to CKD, high risk for ESRD-  on new hemodialysis:  · can discharge once has HD slot  · TCC in place   · Completed course of empiric rocephin 7 days      · HTN:  · Continued norvasc, clonidine, labetalol      · DM2:  · On SSI  · DM2 education       DC planning/Dispo:   To home once she has outpatient hemodialysis slot     Diet:  DIET RENAL  DVT ppx:  heparin    Signed:  Luke Pacheco MD

## 2021-01-04 NOTE — PROGRESS NOTES
[facility] has faxed complete referral packet to [address] [facility]al with request for a HD slot in [place] if available. Will follow up Monday to confirm slot.

## 2021-01-04 NOTE — PROGRESS NOTES
Perfect serve message sent to Dr. Ethelene Collet, informing pt's /109. Hyralazine ordered for DBP greater than 110, per Dr. Ethelene Collet, okay to adm Hydralazine, he will change parameters for administration.

## 2021-01-04 NOTE — DIABETES MGMT
Patient admitted 2020 with acute renal failure. New diagnosis type 2 DM. History of HTN. HbA1c: 10.2 (eA). FBS  123. Creatinine: 8.69. GFR: 7. Blood glucose range yesterday 129-154 with pt receiving a total 2 units of Humalog sliding scale coverage. Pt states positive family history of DM. Patient given educational material, \"Diabetes Self-Management: A Patient Teaching Guide\", which was reviewed with pt. Explained basic physiology of diabetes, as well as causes, s/s, and treatments for hypoglycemia and hyperglycemia. Described the effects of poor glycemic control on the development of long-term complications such as renal, eye, nerve, and cardiovascular disease. Described proper diabetic foot care and the importance of checking feet qd. Patient denies numbness and tingling in feet. Per patient they typically drink juice and water. Reviewed the effects of sweetened beverages on glycemic control and alternative beverages to help improve glycemic control. Educated re: effects of carbohydrates on blood glucose, the \"plate method\" of healthy meal planning, basics of healthy meal plan, Consistent Carbohydrate Diet, discussed the basics of carb counting and how to read a nutrition label. RD has provided pt with handouts for renal diet. Educated patient regarding the benefits of physical activity (as directed by provider) on glycemic control. Also explained the relationship between hyperglycemia and infection and delayed healing. Discussed target goals for blood glucose and A1C. Educated patient regarding diabetic medications including mechanism of action, timing, and possible side effects. Patient verbalizes understanding of teaching. Explained the importance of blood glucose monitoring. Recommended frequency of qid blood glucose checks and to record in log book to take to PCP appointment.  Demonstrated how to use a blood glucose meter, care of strips, and sharps disposal. Educated patient that all glucometers are similar but differ in small ways. Educated patient that they have to get the glucometer covered by their insurance formulary to keep their costs down. Educated patient to seek out  affordable glucometer options that exist at some stores or drug stores if they are ever uninsured. Pt was able to return demonstrate correct use of meter. Pt will need prescription for glucometer and glucometer supplies at discharge so that the patient may obtain the meter covered by their insurance. Patient instructed regarding preparation and injection of insulin dose insulin pen method. Patient returned demonstrated proper insulin pen injection technique using injection model. Nursing will continue to have patient practice insulin self-injection when insulin dose is due and document progress or refusals in progress notes. Patient verbalizes understanding of site rotation, storage of insulin, and proper sharps disposal. Patient was also instructed in proper use of vial and syringes. Patient prefers insulin pens. Patient will need prescription for insulin pens and pen needles at discharge. Educated patient regarding current insulin regimen of Humalog sliding scale coverage 4x/day ac and hs, including type of insulin, timing with meals, onset, duration of effect, and peak of insulin dose. Instructed patient to always seek guidance from their primary care provider about adjusting their insulin doses and not to adjust them on their own as this could negatively impact their glycemic control or result in hypoglycemia. Patient verbalizes understanding. Patient would likely benefit from continued diabetes outpatient education. Patient declines a referral at this time. Patient provided with contact information to [facility] program to pursue at their convenience after discharge with their PCP. Encouraged compliance with discharge regimen.  Encouraged patient to continue to work on lifestyle modifications and to follow up with PCP for further titration of regimen. Patient verbalized understanding and voices no further questions regarding diabetes management at this time.

## 2021-01-04 NOTE — PROGRESS NOTES
MAINE spoke with Nell Henry at [address] Renal admissions. She confirmed that they did receive the referral for a HD slot at their [facility] clinic. She is waiting on financial clearance and MD review of the information to provide a chair time. She will update MAINE later today on the progress. 1400:  Per [facility], pt's current insurance termed on 12/31/2020. MAINE met with pt to determine if she has a new policy. Per hospital protocol, CM wore appropriate PPE and observed social distancing. No direct physical contact. She stated her new plan is with [address] [place] [address] but she has not yet received her new card. She will try to contact them to obtain her member ID number.

## 2021-01-04 NOTE — PROGRESS NOTES
Comprehensive Nutrition Assessment    Type and Reason for Visit: Reassess  Diet education (new DM and HD)    Nutrition Recommendations/Plan:   Nutrition Education:  Verbally reviewed information with Patient  Educated on basic consistent cho and renal guidelines. Written educational materials provided - DM my plate, consistent cho guidelines, renal basics and renal shopping guide. Malnutrition Assessment:  Malnutrition Status: No malnutrition    Nutrition Assessment:   Nutrition History: Per pt's mother at bedside, pt has had poor PO intake for ~2 weeks PTA. Nutrition Background: Pt admitted with decreased UOP, abdominal pain, n/v/d. Newly diagnosed DM and new to HD. H/O HTN. Daily Update:  Pt reports her appetite and intake are improving overall. She indicates prior to feeling poorly before admission she had started to modify her usual intake selecting more fruits and vegetables, eating fewer breads and drinking water. She has appropriate questions regarding diet restrictions at my visit. Currently her intake as recalled by pt and RD visualized tray she meets 75% or greater of estimated needs.    Nutrition Related Findings:   No physical findings indicative of malnutrition      Current Nutrition Therapies:  DIET RENAL Regular; Consistent Carb 1500-1600kcal    Current Intake:   Average Meal Intake: 51-75% Average Supplement Intake: None ordered      Anthropometric Measures:  Height: 5' 5\" (165.1 cm)  Current Body Wt: 128.2 kg (282 lb 10.1 oz)(1/1), Weight source: Bed scale  BMI: 47, Obese class 3 (BMI 40.0 or greater)  Admission Body Weight: 274 lb 14.6 oz(12/23, source not specified)  Ideal Body Wt: 125 lbs (57 kg), 197.5 %          Estimated Daily Nutrient Needs:  Energy (kcal/day): 0978-2938 (Kcal/kg(25-30), Weight Used: Ideal(56.8 kg))  Protein (g/day): 68-74(1.2-1.3) Weight Used: (Ideal(56.8 kg))  Fluid (ml/day):   ( )    Nutrition Diagnosis:   · Food & nutrition-related knowledge deficit related to (new medical diagnosis) as evidenced by (newly diagnosed with DM and new to HD, no prior education.)    Nutrition Interventions:   Food and/or Nutrient Delivery: Continue current diet  Nutrition Education/Counseling: Education completed  Coordination of Nutrition Care: Continue to monitor while inpatient  Plan of Care discussed with Farida De Dios RN.       Goals: Previous Goal Met: Goal(s) achieved  Active Goal: Name 3 foods containing cho and 3 foods restricted on renal diet    Nutrition Monitoring and Evaluation:   Behavioral-Environmental Outcomes: Knowledge or skill  Food/Nutrient Intake Outcomes: Food and nutrient intake  Physical Signs/Symptoms Outcomes: Biochemical data    Discharge Planning:    Recommend pursue outpatient nutrition counseling(Available at HD facilities outpatient)    Jazmin House RD, LDN on 1/4/2021 at 1:26 PM  Contact: [phone]     Disaster Mode active

## 2021-01-04 NOTE — PROGRESS NOTES
[facility]    Follow-Up on: JOSSELIN on CKD     HPI: Pt seen and examined    ROS:  Joseph CP, SOB.     Current Facility-Administered Medications   Medication Dose Route Frequency    senna-docusate (PERICOLACE) 8.6-50 mg per tablet 1 Tab  1 Tab Oral DAILY PRN    heparin (porcine) 1,000 unit/mL injection 5,000 Units  5,000 Units Hemodialysis DIALYSIS PRN    amLODIPine (NORVASC) tablet 5 mg  5 mg Oral DAILY    midazolam (VERSED) injection 0.5-2 mg  0.5-2 mg IntraVENous Multiple    fentaNYL citrate (PF) injection 25-50 mcg  25-50 mcg IntraVENous Multiple    epoetin marianne-epbx (RETACRIT) injection 10,000 Units  10,000 Units SubCUTAneous Q7D    fentaNYL citrate (PF) injection 12.5-100 mcg  12.5-100 mcg IntraVENous Multiple    midazolam (VERSED) injection 0.25-2 mg  0.25-2 mg IntraVENous Multiple    diphenhydrAMINE (BENADRYL) injection 50 mg  50 mg IntraVENous Multiple    HYDROcodone-acetaminophen (NORCO)  mg tablet 1 Tab  1 Tab Oral Q6H PRN    labetaloL (NORMODYNE;TRANDATE) injection 20 mg  20 mg IntraVENous Q1H PRN    cloNIDine HCL (CATAPRES) tablet 0.2 mg  0.2 mg Oral BID    sevelamer carbonate (RENVELA) tab 800 mg  800 mg Oral TID WITH MEALS    labetaloL (NORMODYNE) tablet 300 mg  300 mg Oral TID    famotidine (PEPCID) tablet 20 mg  20 mg Oral DAILY    insulin lispro (HUMALOG) injection 0-10 Units  0-10 Units SubCUTAneous AC&HS    lip protectant (BLISTEX) ointment 1 Each  1 Each Topical PRN    alum-mag hydroxide-simeth (MYLANTA) oral suspension 30 mL  30 mL Oral Q4H PRN    heparin (porcine) injection 5,000 Units  5,000 Units SubCUTAneous Q8H    sodium chloride (NS) flush 5-40 mL  5-40 mL IntraVENous Q8H    sodium chloride (NS) flush 5-40 mL  5-40 mL IntraVENous PRN    acetaminophen (TYLENOL) tablet 650 mg  650 mg Oral Q6H PRN    Or    acetaminophen (TYLENOL) suppository 650 mg  650 mg Rectal Q6H PRN    polyethylene glycol (MIRALAX) packet 17 g  17 g Oral DAILY PRN    promethazine (PHENERGAN) tablet 12.5 mg  12.5 mg Oral Q6H PRN    hydrALAZINE (APRESOLINE) tablet 25 mg  25 mg Oral TID PRN       Exam:  Vitals:    01/01/21 0456 01/04/21 0037 01/04/21 0544 01/04/21 0817   BP:  135/88 (!) 147/91 (!) 160/93   Pulse:  78 76 76   Resp:  18 18 18   Temp:  97.9 °F (36.6 °C) 97.8 °F (36.6 °C) 97.6 °F (36.4 °C)   SpO2:  98% 100% 97%   Weight: 128.2 kg (282 lb 9.6 oz)      Height:           No intake or output data in the 24 hours ending 01/04/21 1137  PE:  GEN - in no distress  CV - regular, no murmur, no rub  Lung - clear bilaterally  Abd - soft, nontender  Ext - no edema    Labs  Recent Labs     01/03/21  0743   WBC 7.8   HGB 9.0*   HCT 29.3*        Recent Labs     01/04/21  0527 01/03/21  0743 01/02/21  1312    137 136   K 4.4 4.0 4.2    101 101   CO2 28 28 28   BUN 36* 29* 33*   CREA 8.69* 7.06* 8.75*   * 130* 129*   CA 9.4 9.2 9.1   PHOS 6.0* 5.0* 6.4*     No results for input(s): PH, PCO2, PO2, PCO2 in the last 72 hours. Problem List:  Patient Active Problem List    Diagnosis Date Noted    Acute renal failure on dialysis [facility]) 12/31/2020    Acute renal failure (ARF) ([facility] [address] 12/24/2020    Elevated procalcitonin 12/24/2020    Obesity 12/24/2020    Hx of essential hypertension 12/24/2020    Microcytic anemia 12/24/2020       Issues Addressed By Nephrology:    Plan:  1. JSOSELIN ? ESRD  2. 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 First dialysis 12/24  4. [address] placed 12/30/21  5. Disposition awaiting outpt slot under JOSSELIN  6.  Next HD in am

## 2021-01-05 LAB
ALBUMIN SERPL-MCNC: 2.1 G/DL (ref 3.5–5)
ANION GAP SERPL CALC-SCNC: 8 MMOL/L (ref 7–16)
BUN SERPL-MCNC: 42 MG/DL (ref 6–23)
CALCIUM SERPL-MCNC: 9.6 MG/DL (ref 8.3–10.4)
CHLORIDE SERPL-SCNC: 103 MMOL/L (ref 98–107)
CO2 SERPL-SCNC: 26 MMOL/L (ref 21–32)
CREAT SERPL-MCNC: 9.82 MG/DL (ref 0.6–1)
GLUCOSE BLD STRIP.AUTO-MCNC: 105 MG/DL (ref 65–100)
GLUCOSE BLD STRIP.AUTO-MCNC: 114 MG/DL (ref 65–100)
GLUCOSE BLD STRIP.AUTO-MCNC: 142 MG/DL (ref 65–100)
GLUCOSE SERPL-MCNC: 122 MG/DL (ref 65–100)
PHOSPHATE SERPL-MCNC: 6.7 MG/DL (ref 2.5–4.5)
POTASSIUM SERPL-SCNC: 4.3 MMOL/L (ref 3.5–5.1)
SODIUM SERPL-SCNC: 137 MMOL/L (ref 136–145)

## 2021-01-05 PROCEDURE — 74011250637 HC RX REV CODE- 250/637: Performed by: HOSPITALIST

## 2021-01-05 PROCEDURE — 74011250637 HC RX REV CODE- 250/637: Performed by: INTERNAL MEDICINE

## 2021-01-05 PROCEDURE — 65660000000 HC RM CCU STEPDOWN

## 2021-01-05 PROCEDURE — 36415 COLL VENOUS BLD VENIPUNCTURE: CPT

## 2021-01-05 PROCEDURE — 74011250637 HC RX REV CODE- 250/637: Performed by: STUDENT IN AN ORGANIZED HEALTH CARE EDUCATION/TRAINING PROGRAM

## 2021-01-05 PROCEDURE — 2709999900 HC NON-CHARGEABLE SUPPLY

## 2021-01-05 PROCEDURE — 90935 HEMODIALYSIS ONE EVALUATION: CPT

## 2021-01-05 PROCEDURE — 74011250636 HC RX REV CODE- 250/636: Performed by: INTERNAL MEDICINE

## 2021-01-05 PROCEDURE — 80069 RENAL FUNCTION PANEL: CPT

## 2021-01-05 PROCEDURE — 82962 GLUCOSE BLOOD TEST: CPT

## 2021-01-05 PROCEDURE — 5A1D90Z PERFORMANCE OF URINARY FILTRATION, CONTINUOUS, GREATER THAN 18 HOURS PER DAY: ICD-10-PCS | Performed by: INTERNAL MEDICINE

## 2021-01-05 PROCEDURE — 90945 DIALYSIS ONE EVALUATION: CPT

## 2021-01-05 PROCEDURE — 74011250636 HC RX REV CODE- 250/636: Performed by: STUDENT IN AN ORGANIZED HEALTH CARE EDUCATION/TRAINING PROGRAM

## 2021-01-05 PROCEDURE — 74011250637 HC RX REV CODE- 250/637: Performed by: NURSE PRACTITIONER

## 2021-01-05 RX ORDER — AMLODIPINE BESYLATE 5 MG/1
5 TABLET ORAL 2 TIMES DAILY
Status: DISCONTINUED | OUTPATIENT
Start: 2021-01-05 | End: 2021-01-11 | Stop reason: HOSPADM

## 2021-01-05 RX ADMIN — FAMOTIDINE 20 MG: 20 TABLET, FILM COATED ORAL at 11:54

## 2021-01-05 RX ADMIN — LABETALOL HYDROCHLORIDE 300 MG: 200 TABLET, FILM COATED ORAL at 11:54

## 2021-01-05 RX ADMIN — HYDRALAZINE HYDROCHLORIDE 50 MG: 50 TABLET, FILM COATED ORAL at 03:49

## 2021-01-05 RX ADMIN — SEVELAMER CARBONATE 800 MG: 800 TABLET, FILM COATED ORAL at 17:16

## 2021-01-05 RX ADMIN — HEPARIN SODIUM 5000 UNITS: 5000 INJECTION INTRAVENOUS; SUBCUTANEOUS at 03:49

## 2021-01-05 RX ADMIN — SEVELAMER CARBONATE 800 MG: 800 TABLET, FILM COATED ORAL at 11:54

## 2021-01-05 RX ADMIN — CLONIDINE HYDROCHLORIDE 0.2 MG: 0.1 TABLET ORAL at 11:54

## 2021-01-05 RX ADMIN — LABETALOL HYDROCHLORIDE 300 MG: 200 TABLET, FILM COATED ORAL at 17:16

## 2021-01-05 RX ADMIN — Medication 10 ML: at 03:50

## 2021-01-05 RX ADMIN — Medication 10 ML: at 14:00

## 2021-01-05 RX ADMIN — HEPARIN SODIUM 5000 UNITS: 5000 INJECTION INTRAVENOUS; SUBCUTANEOUS at 22:18

## 2021-01-05 RX ADMIN — HEPARIN SODIUM 5000 UNITS: 1000 INJECTION INTRAVENOUS; SUBCUTANEOUS at 07:33

## 2021-01-05 RX ADMIN — CLONIDINE HYDROCHLORIDE 0.2 MG: 0.1 TABLET ORAL at 22:18

## 2021-01-05 RX ADMIN — LABETALOL HYDROCHLORIDE 300 MG: 200 TABLET, FILM COATED ORAL at 22:18

## 2021-01-05 RX ADMIN — HEPARIN SODIUM 5000 UNITS: 5000 INJECTION INTRAVENOUS; SUBCUTANEOUS at 14:00

## 2021-01-05 RX ADMIN — AMLODIPINE BESYLATE 5 MG: 5 TABLET ORAL at 11:54

## 2021-01-05 RX ADMIN — Medication 10 ML: at 22:18

## 2021-01-05 RX ADMIN — AMLODIPINE BESYLATE 5 MG: 5 TABLET ORAL at 17:16

## 2021-01-05 NOTE — DIALYSIS
Dialyzer clotted. All blood returned.   New system set up and treatment continued with Heparin increased to 1000 units/hour

## 2021-01-05 NOTE — PROGRESS NOTES
Pt back from dialysis in stable condition. A&O x4 with VSS. No c/o pain, nausea, and no signs of acute distress noted.

## 2021-01-05 NOTE — DIALYSIS
Off dialysis at 1135 and 2 Kg's removed. Patient tolerated well. [facility] perma Catheter flushed with 10 ml NS per protocol. Vital signs  HR=84, BP=160/101. Pt noted alert and oriented. Pt to room after treatment completed.

## 2021-01-05 NOTE — DIABETES MGMT
. Blood glucose yesterday 109-151 with pt receiving a total 4 units of Humalog. Reviewed current insulin regimen: Humalog sliding scale coverage 4x/day ac and hs. Pt verbalizes understanding and has no further questions regarding diabetes management at this time.

## 2021-01-05 NOTE — PROGRESS NOTES
[facility]    Follow-Up on: JOSSELIN on CKD     HPI: Pt seen and examined on HD, dialyzing via right  Qb, UF 2600 no new complaints, tolerating dialysis.      ROS:  General: no fever/chills, appetite ok  CV: no CP  Lung: no SOB, no cough  GI: no N/V/D  Ext: no edema     Current Facility-Administered Medications   Medication Dose Route Frequency    hydrALAZINE (APRESOLINE) tablet 50 mg  50 mg Oral Q6H PRN    senna-docusate (PERICOLACE) 8.6-50 mg per tablet 1 Tab  1 Tab Oral DAILY PRN    heparin (porcine) 1,000 unit/mL injection 5,000 Units  5,000 Units Hemodialysis DIALYSIS PRN    amLODIPine (NORVASC) tablet 5 mg  5 mg Oral DAILY    midazolam (VERSED) injection 0.5-2 mg  0.5-2 mg IntraVENous Multiple    epoetin marianne-epbx (RETACRIT) injection 10,000 Units  10,000 Units SubCUTAneous Q7D    midazolam (VERSED) injection 0.25-2 mg  0.25-2 mg IntraVENous Multiple    diphenhydrAMINE (BENADRYL) injection 50 mg  50 mg IntraVENous Multiple    HYDROcodone-acetaminophen (NORCO)  mg tablet 1 Tab  1 Tab Oral Q6H PRN    labetaloL (NORMODYNE;TRANDATE) injection 20 mg  20 mg IntraVENous Q1H PRN    cloNIDine HCL (CATAPRES) tablet 0.2 mg  0.2 mg Oral BID    sevelamer carbonate (RENVELA) tab 800 mg  800 mg Oral TID WITH MEALS    labetaloL (NORMODYNE) tablet 300 mg  300 mg Oral TID    famotidine (PEPCID) tablet 20 mg  20 mg Oral DAILY    insulin lispro (HUMALOG) injection 0-10 Units  0-10 Units SubCUTAneous AC&HS    lip protectant (BLISTEX) ointment 1 Each  1 Each Topical PRN    alum-mag hydroxide-simeth (MYLANTA) oral suspension 30 mL  30 mL Oral Q4H PRN    heparin (porcine) injection 5,000 Units  5,000 Units SubCUTAneous Q8H    sodium chloride (NS) flush 5-40 mL  5-40 mL IntraVENous Q8H    sodium chloride (NS) flush 5-40 mL  5-40 mL IntraVENous PRN    acetaminophen (TYLENOL) tablet 650 mg  650 mg Oral Q6H PRN    Or    acetaminophen (TYLENOL) suppository 650 mg  650 mg Rectal Q6H PRN    polyethylene glycol (MIRALAX) packet 17 g  17 g Oral DAILY PRN    promethazine (PHENERGAN) tablet 12.5 mg  12.5 mg Oral Q6H PRN       Exam:  Vitals:    01/05/21 0005 01/05/21 0320 01/05/21 0609 01/05/21 0731   BP: (!) 147/93 (!) 158/103  (!) 162/100   Pulse: 79 78  75   Resp: 18 20     Temp: 97.9 °F (36.6 °C) 98.2 °F (36.8 °C)     SpO2: 99%      Weight:   124.7 kg (275 lb)    Height:           No intake or output data in the 24 hours ending 01/05/21 0732  PE:  GEN - in no distress, alert and oriented  CV - regular rate. S1, S2, no murmur, no rub  Lung - clear bilaterally  Abd - soft, nontender  Ext - no edema  Access: right TCC- intact     Labs  Recent Labs     01/03/21  0743   WBC 7.8   HGB 9.0*   HCT 29.3*        Recent Labs     01/05/21  0539 01/04/21  0527 01/03/21  0743    137 137   K 4.3 4.4 4.0    102 101   CO2 26 28 28   BUN 42* 36* 29*   CREA 9.82* 8.69* 7.06*   * 123* 130*   CA 9.6 9.4 9.2   PHOS 6.7* 6.0* 5.0*     No results for input(s): PH, PCO2, PO2, PCO2 in the last 72 hours. Problem List:  Patient Active Problem List    Diagnosis Date Noted    Acute renal failure on dialysis [facility]) 12/31/2020    Acute renal failure (ARF) ([facility] [address] 12/24/2020    Elevated procalcitonin 12/24/2020    Obesity 12/24/2020    Hx of essential hypertension 12/24/2020    Microcytic anemia 12/24/2020       Issues Addressed By Nephrology:    Plan:  1. JOSSELIN ? ESRD- seen on HD, tolerating dialysis   2. 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 First dialysis 12/24  4. [address] placed 12/30/21  5. Disposition awaiting outpt slot under JOSSELIN  6.  Anemia on alie

## 2021-01-05 NOTE — PROGRESS NOTES
TC from patient. She has spoken to her  and she is working with the pt's new insurance company to obtain the new member ID #. She will call SW with the information as soon as it is received. [facility] requires the full insurance information before they can assign a dialysis slot.

## 2021-01-05 NOTE — PROGRESS NOTES
End of shift report. Pt resting quietly in bed with no c/o pain, nausea, and no signs of acute distress noted. Bed locked in lowest position with call light in reach.  Will give report to oncoming RN

## 2021-01-05 NOTE — DIALYSIS
TRANSFER IN - DIALYSIS    Received patient in dialysis unit  from [facility] (unit) for ordered procedure. Consent verified for renal replacement therapy. Patient alert and vital signs stable. /100 P 75    Hemodialysis initiated using Right CVC. Aspirated and flushed both ports without difficulty. Dressing clean, dry and intact. Machine settings per MD order. Heparin 2000 unit bolus and 500 units/hr. Will monitor during treatment.

## 2021-01-05 NOTE — PROGRESS NOTES
Hospitalist Note     Admit Date:  2020 10:57 PM   Name:  Marla Quintero   Age:  37 y.o.  :  1985   MRN:  [id number]   PCP:  Peyton Hogan MD  Treatment Team: Attending Provider: Poli Rocha MD; Consulting Provider: Linwood Shore NP; Utilization Review: Janell Rosas RN; Tech: Bucky [place]; Utilization Review: Mau Luna; Care Manager: Micheal Dumont LMSW; Medical Assistant: Mayela Rivera RN; Utilization Review: Shara Suarez    HPI/Subjective:       Ms. Albino Buenrostro is a 38 yo female with PMH of HTN, DM2, admitted with JOSSELIN. She has been seen by nephrology and required hemodialysis that likely will be needed longterm. TCC placed per IR. Completed course of empiric rocephin 7 days, urine cx skin jae. She is s/p renal biopsy path showing \"inflammation, collapsing glomerulopathy and hypertensive changes\", per nephrology she is not quite end stage disease but high risk. She is pending outpatient HD slot. Discharge is expected to home with mother. 21 ready to go home but still needs slot, some anorexia, no BM change, no dyspnea     1/3/21 patient sitting up in chair this morning. Feeling well. Currently awaiting outpatient hemodialysis slot. No chest pain no palpitations.     21 had HD today  Making urine  Has been counseled ESRD diet    Objective:     Patient Vitals for the past 24 hrs:   Temp Pulse Resp BP SpO2   21 1205 98.5 °F (36.9 °C) 72 16 (!) 156/98 96 %   21 1135 -- 84 -- (!) 160/101 --   21 1059 -- 89 -- (!) 160/100 --   21 1030 -- 97 -- (!) 157/104 --   21 1000 -- 94 -- (!) 155/98 --   21 0927 -- 82 -- (!) 163/97 --   21 0900 -- 77 -- (!) 161/103 --   21 0830 -- 76 -- (!) 164/103 --   21 0800 -- 68 -- (!) 153/99 --   21 0731 -- 75 -- (!) 162/100 --   21 0320 98.2 °F (36.8 °C) 78 20 (!) 158/103 --   21 0005 97.9 °F (36.6 °C) 79 18 (!) 147/93 99 %   21 1938 97 °F (36.1 °C) 72 20 (!) 153/97 99 %   01/04/21 1646 -- 87 -- (!) 138/93 --   01/04/21 1519 -- 80 -- (!) 159/109 --     Oxygen Therapy  O2 Sat (%): 96 % (01/05/21 1205)  Pulse via Oximetry: 84 beats per minute (12/29/20 1133)  O2 Device: Room air (01/05/21 1155)  O2 Flow Rate (L/min): 3 l/min (12/30/20 0840)  ETCO2 (mmHg): 42 mmHg (12/29/20 1054)    Estimated body mass index is 45.76 kg/m² as calculated from the following:    Height as of this encounter: 5' 5\" (1.651 m). Weight as of this encounter: 124.7 kg (275 lb). Intake/Output Summary (Last 24 hours) at 1/5/2021 1431  Last data filed at 1/5/2021 1135  Gross per 24 hour   Intake --   Output 2000 ml   Net -2000 ml       *Note that automatically entered I/Os may not be accurate; dependent on patient compliance with collection and accurate  by techs. General:    Well nourished. Alert. No distress, obese  CV:   RRR. No murmur, rub, or gallop. No edema   Lungs:   CTAB. No wheezing, rhonchi, or rales. Abdomen:   Soft, nontender, nondistended. Obese, present BS  Extremities: Warm and dry. Skin:     No rashes or jaundice.    Neuro:  No gross focal deficits    Data Review:  I have reviewed all labs, meds, and studies from the last 24 hours:  Recent Results (from the past 24 hour(s))   GLUCOSE, POC    Collection Time: 01/04/21  4:13 PM   Result Value Ref Range    Glucose (POC) 151 (H) 65 - 100 mg/dL   GLUCOSE, POC    Collection Time: 01/04/21  9:29 PM   Result Value Ref Range    Glucose (POC) 118 (H) 65 - 100 mg/dL   RENAL FUNCTION PANEL    Collection Time: 01/05/21  5:39 AM   Result Value Ref Range    Sodium 137 136 - 145 mmol/L    Potassium 4.3 3.5 - 5.1 mmol/L    Chloride 103 98 - 107 mmol/L    CO2 26 21 - 32 mmol/L    Anion gap 8 7 - 16 mmol/L    Glucose 122 (H) 65 - 100 mg/dL    BUN 42 (H) 6 - 23 MG/DL    Creatinine 9.82 (H) 0.6 - 1.0 MG/DL    GFR est AA 6 (L) >60 ml/min/1.73m2    GFR est non-AA 5 (L) >60 ml/min/1.73m2    Calcium 9.6 8.3 - 10.4 MG/DL    Phosphorus 6.7 (H) 2.5 - 4.5 MG/DL    Albumin 2.1 (L) 3.5 - 5.0 g/dL   GLUCOSE, POC    Collection Time: 01/05/21 11:55 AM   Result Value Ref Range    Glucose (POC) 114 (H) 65 - 100 mg/dL        Current Meds:  Current Facility-Administered Medications   Medication Dose Route Frequency    hydrALAZINE (APRESOLINE) tablet 50 mg  50 mg Oral Q6H PRN    senna-docusate (PERICOLACE) 8.6-50 mg per tablet 1 Tab  1 Tab Oral DAILY PRN    heparin (porcine) 1,000 unit/mL injection 5,000 Units  5,000 Units Hemodialysis DIALYSIS PRN    amLODIPine (NORVASC) tablet 5 mg  5 mg Oral DAILY    midazolam (VERSED) injection 0.5-2 mg  0.5-2 mg IntraVENous Multiple    epoetin marianne-epbx (RETACRIT) injection 10,000 Units  10,000 Units SubCUTAneous Q7D    midazolam (VERSED) injection 0.25-2 mg  0.25-2 mg IntraVENous Multiple    diphenhydrAMINE (BENADRYL) injection 50 mg  50 mg IntraVENous Multiple    HYDROcodone-acetaminophen (NORCO)  mg tablet 1 Tab  1 Tab Oral Q6H PRN    labetaloL (NORMODYNE;TRANDATE) injection 20 mg  20 mg IntraVENous Q1H PRN    cloNIDine HCL (CATAPRES) tablet 0.2 mg  0.2 mg Oral BID    sevelamer carbonate (RENVELA) tab 800 mg  800 mg Oral TID WITH MEALS    labetaloL (NORMODYNE) tablet 300 mg  300 mg Oral TID    famotidine (PEPCID) tablet 20 mg  20 mg Oral DAILY    insulin lispro (HUMALOG) injection 0-10 Units  0-10 Units SubCUTAneous AC&HS    lip protectant (BLISTEX) ointment 1 Each  1 Each Topical PRN    alum-mag hydroxide-simeth (MYLANTA) oral suspension 30 mL  30 mL Oral Q4H PRN    heparin (porcine) injection 5,000 Units  5,000 Units SubCUTAneous Q8H    sodium chloride (NS) flush 5-40 mL  5-40 mL IntraVENous Q8H    sodium chloride (NS) flush 5-40 mL  5-40 mL IntraVENous PRN    acetaminophen (TYLENOL) tablet 650 mg  650 mg Oral Q6H PRN    Or    acetaminophen (TYLENOL) suppository 650 mg  650 mg Rectal Q6H PRN    polyethylene glycol (MIRALAX) packet 17 g  17 g Oral DAILY PRN  promethazine (PHENERGAN) tablet 12.5 mg  12.5 mg Oral Q6H PRN       Other Studies:  No results found for this visit on 12/23/20. No results found. All Micro Results     Procedure Component Value Units Date/Time    CULTURE, URINE [[id number]] Collected: 12/24/20 0016    Order Status: Completed Specimen: Cath Urine Updated: 12/26/20 0719     Special Requests: NO SPECIAL REQUESTS        Culture result:       >100,000 COLONIES/mL MIXED SKIN AZEEM ISOLATED                  THREE OR MORE TYPES OF ORGANISMS ARE PRESENT. THIS IS INDICATIVE OF CONTAMINATION DUE TO IMPROPER COLLECTION TECHNIQUE. PLEASE REPEAT COLLECTION UNLESS PATIENT HAS STARTED ANTIBIOTIC TREATMENT.                 SARS-CoV-2 Lab Results  \"Novel Coronavirus\" Test: No results found for: COV2NT   \"Emergent Disease\" Test: No results found for: EDPR  \"SARS-COV-2\" Test: No results found for: XGCOVT  Rapid Test:   Lab Results   Component Value Date/Time    COVR Not detected 12/24/2020 12:56 AM            Assessment and Plan:     Hospital Problems as of 1/5/2021 Never Reviewed          Codes Class Noted - Resolved POA    * (Principal) Acute renal failure on dialysis [facility]) ICD-10-CM: N17.9, Z99.2  ICD-9-CM: 584.9, V45.11  12/31/2020 - Present Yes        Acute renal failure (ARF) ([facility] [address] ICD-10-CM: N17.9  ICD-9-CM: 584.9  12/24/2020 - Present Unknown        Elevated procalcitonin ICD-10-CM: R79.89  ICD-9-CM: 790.99  12/24/2020 - Present Unknown        Obesity ICD-10-CM: E66.9  ICD-9-CM: 278.00  12/24/2020 - Present Unknown        Hx of essential hypertension ICD-10-CM: Z86.79  ICD-9-CM: V12.59  12/24/2020 - Present Unknown        Microcytic anemia ICD-10-CM: D50.9  ICD-9-CM: 280.9  12/24/2020 - Present Unknown              Plan:      · New onset JOSSELIN progressed to CKD, high risk for ESRD-  on new hemodialysis:  · can discharge once has HD slot  · TCC in place   · Completed course of empiric rocephin 7 days      · HTN:  · Continued norvasc, clonidine, labetalol      · DM2:  · On SSI  · DM2 education       DC planning/Dispo:   To home once she has outpatient hemodialysis slot     Diet:  DIET RENAL  DVT ppx:  heparin    Signed:  Sofi De MD

## 2021-01-06 LAB
ALBUMIN SERPL-MCNC: 2.1 G/DL (ref 3.5–5)
ALBUMIN/GLOB SERPL: 0.4 {RATIO} (ref 1.2–3.5)
ALP SERPL-CCNC: 103 U/L (ref 50–136)
ALT SERPL-CCNC: 11 U/L (ref 12–65)
ANION GAP SERPL CALC-SCNC: 6 MMOL/L (ref 7–16)
AST SERPL-CCNC: 16 U/L (ref 15–37)
BASOPHILS # BLD: 0.1 K/UL (ref 0–0.2)
BASOPHILS NFR BLD: 2 % (ref 0–2)
BILIRUB SERPL-MCNC: 0.4 MG/DL (ref 0.2–1.1)
BUN SERPL-MCNC: 27 MG/DL (ref 6–23)
CALCIUM SERPL-MCNC: 9 MG/DL (ref 8.3–10.4)
CHLORIDE SERPL-SCNC: 101 MMOL/L (ref 98–107)
CO2 SERPL-SCNC: 29 MMOL/L (ref 21–32)
CREAT SERPL-MCNC: 6.96 MG/DL (ref 0.6–1)
DIFFERENTIAL METHOD BLD: ABNORMAL
EOSINOPHIL # BLD: 0.1 K/UL (ref 0–0.8)
EOSINOPHIL NFR BLD: 2 % (ref 0.5–7.8)
ERYTHROCYTE [DISTWIDTH] IN BLOOD BY AUTOMATED COUNT: 13.9 % (ref 11.9–14.6)
GLOBULIN SER CALC-MCNC: 5.2 G/DL (ref 2.3–3.5)
GLUCOSE BLD STRIP.AUTO-MCNC: 117 MG/DL (ref 65–100)
GLUCOSE BLD STRIP.AUTO-MCNC: 121 MG/DL (ref 65–100)
GLUCOSE BLD STRIP.AUTO-MCNC: 124 MG/DL (ref 65–100)
GLUCOSE BLD STRIP.AUTO-MCNC: 178 MG/DL (ref 65–100)
GLUCOSE SERPL-MCNC: 110 MG/DL (ref 65–100)
HCT VFR BLD AUTO: 28.7 % (ref 35.8–46.3)
HGB BLD-MCNC: 9 G/DL (ref 11.7–15.4)
IMM GRANULOCYTES # BLD AUTO: 0 K/UL (ref 0–0.5)
IMM GRANULOCYTES NFR BLD AUTO: 0 % (ref 0–5)
LYMPHOCYTES # BLD: 1.8 K/UL (ref 0.5–4.6)
LYMPHOCYTES NFR BLD: 28 % (ref 13–44)
MCH RBC QN AUTO: 25.4 PG (ref 26.1–32.9)
MCHC RBC AUTO-ENTMCNC: 31.4 G/DL (ref 31.4–35)
MCV RBC AUTO: 80.8 FL (ref 79.6–97.8)
MONOCYTES # BLD: 0.8 K/UL (ref 0.1–1.3)
MONOCYTES NFR BLD: 13 % (ref 4–12)
NEUTS SEG # BLD: 3.5 K/UL (ref 1.7–8.2)
NEUTS SEG NFR BLD: 56 % (ref 43–78)
NRBC # BLD: 0 K/UL (ref 0–0.2)
PHOSPHATE SERPL-MCNC: 4.9 MG/DL (ref 2.5–4.5)
PLATELET # BLD AUTO: 148 K/UL (ref 150–450)
PMV BLD AUTO: 9.8 FL (ref 9.4–12.3)
POTASSIUM SERPL-SCNC: 3.8 MMOL/L (ref 3.5–5.1)
PROT SERPL-MCNC: 7.3 G/DL (ref 6.3–8.2)
RBC # BLD AUTO: 3.55 M/UL (ref 4.05–5.2)
SODIUM SERPL-SCNC: 136 MMOL/L (ref 136–145)
WBC # BLD AUTO: 6.3 K/UL (ref 4.3–11.1)

## 2021-01-06 PROCEDURE — 84100 ASSAY OF PHOSPHORUS: CPT

## 2021-01-06 PROCEDURE — 85025 COMPLETE CBC W/AUTO DIFF WBC: CPT

## 2021-01-06 PROCEDURE — 65660000000 HC RM CCU STEPDOWN

## 2021-01-06 PROCEDURE — 74011250637 HC RX REV CODE- 250/637: Performed by: STUDENT IN AN ORGANIZED HEALTH CARE EDUCATION/TRAINING PROGRAM

## 2021-01-06 PROCEDURE — 74011250637 HC RX REV CODE- 250/637: Performed by: NURSE PRACTITIONER

## 2021-01-06 PROCEDURE — 74011250636 HC RX REV CODE- 250/636: Performed by: NURSE PRACTITIONER

## 2021-01-06 PROCEDURE — 82962 GLUCOSE BLOOD TEST: CPT

## 2021-01-06 PROCEDURE — 74011250636 HC RX REV CODE- 250/636: Performed by: STUDENT IN AN ORGANIZED HEALTH CARE EDUCATION/TRAINING PROGRAM

## 2021-01-06 PROCEDURE — 36415 COLL VENOUS BLD VENIPUNCTURE: CPT

## 2021-01-06 PROCEDURE — 74011250637 HC RX REV CODE- 250/637: Performed by: INTERNAL MEDICINE

## 2021-01-06 PROCEDURE — 80053 COMPREHEN METABOLIC PANEL: CPT

## 2021-01-06 PROCEDURE — 74011636637 HC RX REV CODE- 636/637: Performed by: STUDENT IN AN ORGANIZED HEALTH CARE EDUCATION/TRAINING PROGRAM

## 2021-01-06 RX ADMIN — FAMOTIDINE 20 MG: 20 TABLET, FILM COATED ORAL at 08:46

## 2021-01-06 RX ADMIN — Medication 10 ML: at 15:14

## 2021-01-06 RX ADMIN — AMLODIPINE BESYLATE 5 MG: 5 TABLET ORAL at 08:46

## 2021-01-06 RX ADMIN — AMLODIPINE BESYLATE 5 MG: 5 TABLET ORAL at 17:21

## 2021-01-06 RX ADMIN — SEVELAMER CARBONATE 800 MG: 800 TABLET, FILM COATED ORAL at 08:46

## 2021-01-06 RX ADMIN — SEVELAMER CARBONATE 800 MG: 800 TABLET, FILM COATED ORAL at 12:14

## 2021-01-06 RX ADMIN — CLONIDINE HYDROCHLORIDE 0.2 MG: 0.1 TABLET ORAL at 21:01

## 2021-01-06 RX ADMIN — LABETALOL HYDROCHLORIDE 300 MG: 200 TABLET, FILM COATED ORAL at 21:01

## 2021-01-06 RX ADMIN — INSULIN LISPRO 2 UNITS: 100 INJECTION, SOLUTION INTRAVENOUS; SUBCUTANEOUS at 12:10

## 2021-01-06 RX ADMIN — HEPARIN SODIUM 5000 UNITS: 5000 INJECTION INTRAVENOUS; SUBCUTANEOUS at 12:14

## 2021-01-06 RX ADMIN — HEPARIN SODIUM 5000 UNITS: 5000 INJECTION INTRAVENOUS; SUBCUTANEOUS at 21:01

## 2021-01-06 RX ADMIN — Medication 10 ML: at 05:19

## 2021-01-06 RX ADMIN — LABETALOL HYDROCHLORIDE 300 MG: 200 TABLET, FILM COATED ORAL at 08:46

## 2021-01-06 RX ADMIN — HEPARIN SODIUM 5000 UNITS: 5000 INJECTION INTRAVENOUS; SUBCUTANEOUS at 05:19

## 2021-01-06 RX ADMIN — EPOETIN ALFA-EPBX 10000 UNITS: 10000 INJECTION, SOLUTION INTRAVENOUS; SUBCUTANEOUS at 15:12

## 2021-01-06 RX ADMIN — SEVELAMER CARBONATE 800 MG: 800 TABLET, FILM COATED ORAL at 17:20

## 2021-01-06 RX ADMIN — Medication 10 ML: at 21:02

## 2021-01-06 RX ADMIN — CLONIDINE HYDROCHLORIDE 0.2 MG: 0.1 TABLET ORAL at 08:46

## 2021-01-06 RX ADMIN — LABETALOL HYDROCHLORIDE 300 MG: 200 TABLET, FILM COATED ORAL at 17:20

## 2021-01-06 NOTE — DIABETES MGMT
Patient admitted with acute renal failure on diaysis. Blood glucose ranged 105-142 yesterday with patient receiving no diabetes medications. Blood glucose this morning was 121. Reviewed patient current regimen: Humalog SSI. Glucose stable on current regimen. Creatinine 6.96. GFR 9. Patient has received diabetes education while hospitalized see chart review notes for details. Pt will need prescription for glucometer and glucometer supplies at discharge so that the patient may obtain the meter covered by their insurance. Patient prefers insulin pens. Patient will need prescription for insulin pens and pen needles at discharge.

## 2021-01-06 NOTE — PROGRESS NOTES
MAINE spoke with Anthony Sarabia of [address] Renal admissions. Informed her that the pt still has not been able to obtain her insurance member number. MAINE requested that they attempt to verify her insurance using her SSN and . She stated she would talk with the insurance verification dept to check. She stated that in order for the pt to be approved through their uninsured program (which could be used until her insurance is verified), she would have to be ESRD. Pt is currently JOSSELIN. SW following.

## 2021-01-06 NOTE — PROGRESS NOTES
[facility]    Follow-Up on: JOSSELIN on CKD     HPI: Pt seen and examined, doing okay.      ROS:  General: no fever/chills, appetite ok  CV: no CP  Lung: no SOB, no cough  GI: no N/V/D  Ext: no edema     Current Facility-Administered Medications   Medication Dose Route Frequency    amLODIPine (NORVASC) tablet 5 mg  5 mg Oral BID    hydrALAZINE (APRESOLINE) tablet 50 mg  50 mg Oral Q6H PRN    senna-docusate (PERICOLACE) 8.6-50 mg per tablet 1 Tab  1 Tab Oral DAILY PRN    heparin (porcine) 1,000 unit/mL injection 5,000 Units  5,000 Units Hemodialysis DIALYSIS PRN    midazolam (VERSED) injection 0.5-2 mg  0.5-2 mg IntraVENous Multiple    epoetin marianne-epbx (RETACRIT) injection 10,000 Units  10,000 Units SubCUTAneous Q7D    midazolam (VERSED) injection 0.25-2 mg  0.25-2 mg IntraVENous Multiple    diphenhydrAMINE (BENADRYL) injection 50 mg  50 mg IntraVENous Multiple    HYDROcodone-acetaminophen (NORCO)  mg tablet 1 Tab  1 Tab Oral Q6H PRN    labetaloL (NORMODYNE;TRANDATE) injection 20 mg  20 mg IntraVENous Q1H PRN    cloNIDine HCL (CATAPRES) tablet 0.2 mg  0.2 mg Oral BID    sevelamer carbonate (RENVELA) tab 800 mg  800 mg Oral TID WITH MEALS    labetaloL (NORMODYNE) tablet 300 mg  300 mg Oral TID    famotidine (PEPCID) tablet 20 mg  20 mg Oral DAILY    insulin lispro (HUMALOG) injection 0-10 Units  0-10 Units SubCUTAneous AC&HS    lip protectant (BLISTEX) ointment 1 Each  1 Each Topical PRN    alum-mag hydroxide-simeth (MYLANTA) oral suspension 30 mL  30 mL Oral Q4H PRN    heparin (porcine) injection 5,000 Units  5,000 Units SubCUTAneous Q8H    sodium chloride (NS) flush 5-40 mL  5-40 mL IntraVENous Q8H    sodium chloride (NS) flush 5-40 mL  5-40 mL IntraVENous PRN    acetaminophen (TYLENOL) tablet 650 mg  650 mg Oral Q6H PRN    Or    acetaminophen (TYLENOL) suppository 650 mg  650 mg Rectal Q6H PRN    polyethylene glycol (MIRALAX) packet 17 g  17 g Oral DAILY PRN    promethazine (PHENERGAN) tablet 12.5 mg  12.5 mg Oral Q6H PRN       Exam:  Vitals:    01/06/21 0117 01/06/21 0517 01/06/21 0735 01/06/21 1155   BP: (!) 153/72 (!) 144/93 (!) 152/88 139/87   Pulse: 77 76 75 76   Resp: 16 16 17 17   Temp: 98 °F (36.7 °C) 98.3 °F (36.8 °C) 98 °F (36.7 °C) 97.9 °F (36.6 °C)   SpO2: 98% 96% 100% 99%   Weight:  124 kg (273 lb 4.8 oz)     Height:           No intake or output data in the 24 hours ending 01/06/21 1206  PE:  GEN - in no distress, alert and oriented  CV - regular rate. S1, S2, no murmur, no rub  Lung - clear bilaterally  Abd - soft, nontender  Ext - no edema  Access: right TCC- intact     Labs  Recent Labs     01/06/21  0541   WBC 6.3   HGB 9.0*   HCT 28.7*   *     Recent Labs     01/06/21  0541 01/05/21  0539 01/04/21  0527    137 137   K 3.8 4.3 4.4    103 102   CO2 29 26 28   BUN 27* 42* 36*   CREA 6.96* 9.82* 8.69*   * 122* 123*   CA 9.0 9.6 9.4   PHOS 4.9* 6.7* 6.0*     No results for input(s): PH, PCO2, PO2, PCO2 in the last 72 hours. Problem List:  Patient Active Problem List    Diagnosis Date Noted    Acute renal failure on dialysis [facility]) 12/31/2020    Acute renal failure (ARF) ([facility] [address] 12/24/2020    Elevated procalcitonin 12/24/2020    Obesity 12/24/2020    Hx of essential hypertension 12/24/2020    Microcytic anemia 12/24/2020       Issues Addressed By Nephrology:    Plan:  1. JOSSELIN /ESRD- Pt continues to get education about dialysis and kidney failure and what to expect. I recommend she read up on dialysis and transplant at [email or web address]. All questions answered. 2. 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 First dialysis 12/24  4. [address] placed 12/30/21  5. Disposition awaiting outpt slot under JOSSELIN  6.  Anemia on alie

## 2021-01-06 NOTE — PROGRESS NOTES
Hospitalist Note     Admit Date:  2020 10:57 PM   Name:  Loulou Woodard   Age:  37 y.o.  :  1985   MRN:  [id number]   PCP:  Ky Orona MD  Treatment Team: Attending Provider: Chun Estes MD; Consulting Provider: Dillan Summers NP; Utilization Review: Julian Vallecillo RN; Tech: Desai ; Utilization Review: Ya Ragsdale; Care Manager: Chase Wing LMSW; Medical Assistant: Sonido Hopkins RN; Utilization Review: Louise Nieto Charge Nurse: Anca Moore    HPI/Subjective:       Ms. Kerry Bryant is a 36 yo female with PMH of HTN, DM2, admitted with JOSSELIN. She has been seen by nephrology and required hemodialysis that likely will be needed longterm. TCC placed per IR. Completed course of empiric rocephin 7 days, urine cx skin jae. She is s/p renal biopsy path showing \"inflammation, collapsing glomerulopathy and hypertensive changes\", per nephrology she is not quite end stage disease but high risk. She is pending outpatient HD slot. Discharge is expected to home with mother. 21 ready to go home but still needs slot, some anorexia, no BM change, no dyspnea     1/3/21 patient sitting up in chair this morning. Feeling well. Currently awaiting outpatient hemodialysis slot. No chest pain no palpitations.   No acute events overnight. Patient clinically stable. Still awaiting chair placement for dialysis.     Objective:     Patient Vitals for the past 24 hrs:   Temp Pulse Resp BP SpO2   21 1155 97.9 °F (36.6 °C) 76 17 139/87 99 %   21 0735 98 °F (36.7 °C) 75 17 (!) 152/88 100 %   21 0517 98.3 °F (36.8 °C) 76 16 (!) 144/93 96 %   21 0117 98 °F (36.7 °C) 77 16 (!) 153/72 98 %   21 1940 97.9 °F (36.6 °C) 77 16 128/88 95 %   21 1618 98.1 °F (36.7 °C) 78 19 118/83 98 %   21 1615 98.4 °F (36.9 °C) 80 18 (!) 150/96 98 %     Oxygen Therapy  O2 Sat (%): 99 % (21 1155)  Pulse via Oximetry: 84 beats per minute (12/29/20 1133)  O2 Device: Room air (01/05/21 1155)  O2 Flow Rate (L/min): 3 l/min (12/30/20 0840)  ETCO2 (mmHg): 42 mmHg (12/29/20 1054)    Estimated body mass index is 45.48 kg/m² as calculated from the following:    Height as of this encounter: 5' 5\" (1.651 m). Weight as of this encounter: 124 kg (273 lb 4.8 oz). No intake or output data in the 24 hours ending 01/06/21 1510    *Note that automatically entered I/Os may not be accurate; dependent on patient compliance with collection and accurate  by techs. General:    Well nourished. Alert. No distress, obese  CV:   RRR. No murmur, rub, or gallop. No edema   Lungs:   CTAB. No wheezing, rhonchi, or rales. Abdomen:   Soft, nontender, nondistended. Obese, present BS  Extremities: Warm and dry. Skin:     No rashes or jaundice. Neuro:  No gross focal deficits    Data Review:  I have reviewed all labs, meds, and studies from the last 24 hours:  Recent Results (from the past 24 hour(s))   GLUCOSE, POC    Collection Time: 01/05/21  4:10 PM   Result Value Ref Range    Glucose (POC) 142 (H) 65 - 100 mg/dL   GLUCOSE, POC    Collection Time: 01/05/21  9:53 PM   Result Value Ref Range    Glucose (POC) 105 (H) 65 - 100 mg/dL   CBC WITH AUTOMATED DIFF    Collection Time: 01/06/21  5:41 AM   Result Value Ref Range    WBC 6.3 4.3 - 11.1 K/uL    RBC 3.55 (L) 4.05 - 5.2 M/uL    HGB 9.0 (L) 11.7 - 15.4 g/dL    HCT 28.7 (L) 35.8 - 46.3 %    MCV 80.8 79.6 - 97.8 FL    MCH 25.4 (L) 26.1 - 32.9 PG    MCHC 31.4 31.4 - 35.0 g/dL    RDW 13.9 11.9 - 14.6 %    PLATELET 993 (L) 949 - 450 K/uL    MPV 9.8 9.4 - 12.3 FL    ABSOLUTE NRBC 0.00 0.0 - 0.2 K/uL    DF AUTOMATED      NEUTROPHILS 56 43 - 78 %    LYMPHOCYTES 28 13 - 44 %    MONOCYTES 13 (H) 4.0 - 12.0 %    EOSINOPHILS 2 0.5 - 7.8 %    BASOPHILS 2 0.0 - 2.0 %    IMMATURE GRANULOCYTES 0 0.0 - 5.0 %    ABS. NEUTROPHILS 3.5 1.7 - 8.2 K/UL    ABS. LYMPHOCYTES 1.8 0.5 - 4.6 K/UL    ABS.  MONOCYTES 0.8 0.1 - 1.3 K/UL    ABS. EOSINOPHILS 0.1 0.0 - 0.8 K/UL    ABS. BASOPHILS 0.1 0.0 - 0.2 K/UL    ABS. IMM. GRANS. 0.0 0.0 - 0.5 K/UL   METABOLIC PANEL, COMPREHENSIVE    Collection Time: 01/06/21  5:41 AM   Result Value Ref Range    Sodium 136 136 - 145 mmol/L    Potassium 3.8 3.5 - 5.1 mmol/L    Chloride 101 98 - 107 mmol/L    CO2 29 21 - 32 mmol/L    Anion gap 6 (L) 7 - 16 mmol/L    Glucose 110 (H) 65 - 100 mg/dL    BUN 27 (H) 6 - 23 MG/DL    Creatinine 6.96 (H) 0.6 - 1.0 MG/DL    GFR est AA 9 (L) >60 ml/min/1.73m2    GFR est non-AA 7 (L) >60 ml/min/1.73m2    Calcium 9.0 8.3 - 10.4 MG/DL    Bilirubin, total 0.4 0.2 - 1.1 MG/DL    ALT (SGPT) 11 (L) 12 - 65 U/L    AST (SGOT) 16 15 - 37 U/L    Alk.  phosphatase 103 50 - 136 U/L    Protein, total 7.3 6.3 - 8.2 g/dL    Albumin 2.1 (L) 3.5 - 5.0 g/dL    Globulin 5.2 (H) 2.3 - 3.5 g/dL    A-G Ratio 0.4 (L) 1.2 - 3.5     PHOSPHORUS    Collection Time: 01/06/21  5:41 AM   Result Value Ref Range    Phosphorus 4.9 (H) 2.5 - 4.5 MG/DL   GLUCOSE, POC    Collection Time: 01/06/21  7:22 AM   Result Value Ref Range    Glucose (POC) 121 (H) 65 - 100 mg/dL   GLUCOSE, POC    Collection Time: 01/06/21 11:37 AM   Result Value Ref Range    Glucose (POC) 178 (H) 65 - 100 mg/dL        Current Meds:  Current Facility-Administered Medications   Medication Dose Route Frequency    amLODIPine (NORVASC) tablet 5 mg  5 mg Oral BID    hydrALAZINE (APRESOLINE) tablet 50 mg  50 mg Oral Q6H PRN    senna-docusate (PERICOLACE) 8.6-50 mg per tablet 1 Tab  1 Tab Oral DAILY PRN    heparin (porcine) 1,000 unit/mL injection 5,000 Units  5,000 Units Hemodialysis DIALYSIS PRN    midazolam (VERSED) injection 0.5-2 mg  0.5-2 mg IntraVENous Multiple    epoetin marianne-epbx (RETACRIT) injection 10,000 Units  10,000 Units SubCUTAneous Q7D    midazolam (VERSED) injection 0.25-2 mg  0.25-2 mg IntraVENous Multiple    diphenhydrAMINE (BENADRYL) injection 50 mg  50 mg IntraVENous Multiple    HYDROcodone-acetaminophen (NORCO)  mg tablet 1 Tab  1 Tab Oral Q6H PRN    labetaloL (NORMODYNE;TRANDATE) injection 20 mg  20 mg IntraVENous Q1H PRN    cloNIDine HCL (CATAPRES) tablet 0.2 mg  0.2 mg Oral BID    sevelamer carbonate (RENVELA) tab 800 mg  800 mg Oral TID WITH MEALS    labetaloL (NORMODYNE) tablet 300 mg  300 mg Oral TID    famotidine (PEPCID) tablet 20 mg  20 mg Oral DAILY    insulin lispro (HUMALOG) injection 0-10 Units  0-10 Units SubCUTAneous AC&HS    lip protectant (BLISTEX) ointment 1 Each  1 Each Topical PRN    alum-mag hydroxide-simeth (MYLANTA) oral suspension 30 mL  30 mL Oral Q4H PRN    heparin (porcine) injection 5,000 Units  5,000 Units SubCUTAneous Q8H    sodium chloride (NS) flush 5-40 mL  5-40 mL IntraVENous Q8H    sodium chloride (NS) flush 5-40 mL  5-40 mL IntraVENous PRN    acetaminophen (TYLENOL) tablet 650 mg  650 mg Oral Q6H PRN    Or    acetaminophen (TYLENOL) suppository 650 mg  650 mg Rectal Q6H PRN    polyethylene glycol (MIRALAX) packet 17 g  17 g Oral DAILY PRN    promethazine (PHENERGAN) tablet 12.5 mg  12.5 mg Oral Q6H PRN       Other Studies:  No results found for this visit on 12/23/20. No results found. All Micro Results     Procedure Component Value Units Date/Time    CULTURE, URINE [[id number]] Collected: 12/24/20 0016    Order Status: Completed Specimen: Cath Urine Updated: 12/26/20 0719     Special Requests: NO SPECIAL REQUESTS        Culture result:       >100,000 COLONIES/mL MIXED SKIN AZEEM ISOLATED                  THREE OR MORE TYPES OF ORGANISMS ARE PRESENT. THIS IS INDICATIVE OF CONTAMINATION DUE TO IMPROPER COLLECTION TECHNIQUE. PLEASE REPEAT COLLECTION UNLESS PATIENT HAS STARTED ANTIBIOTIC TREATMENT.                 SARS-CoV-2 Lab Results  \"Novel Coronavirus\" Test: No results found for: COV2NT   \"Emergent Disease\" Test: No results found for: EDPR  \"SARS-COV-2\" Test: No results found for: XGCOVT  Rapid Test:   Lab Results   Component Value Date/Time    COVR Not detected 12/24/2020 12:56 AM            Assessment and Plan:     Hospital Problems as of 1/6/2021 Never Reviewed          Codes Class Noted - Resolved POA    * (Principal) Acute renal failure on dialysis [facility]) ICD-10-CM: N17.9, Z99.2  ICD-9-CM: 584.9, V45.11  12/31/2020 - Present Yes        Acute renal failure (ARF) ([address] ICD-10-CM: N17.9  ICD-9-CM: 584.9  12/24/2020 - Present Unknown        Elevated procalcitonin ICD-10-CM: R79.89  ICD-9-CM: 790.99  12/24/2020 - Present Unknown        Obesity ICD-10-CM: E66.9  ICD-9-CM: 278.00  12/24/2020 - Present Unknown        Hx of essential hypertension ICD-10-CM: Z86.79  ICD-9-CM: V12.59  12/24/2020 - Present Unknown        Microcytic anemia ICD-10-CM: D50.9  ICD-9-CM: 280.9  12/24/2020 - Present Unknown              Plan:      · New onset JOSSELIN progressed to CKD, high risk for ESRD-  on new hemodialysis:  · can discharge once has HD slot  · TCC in place   · Completed course of empiric rocephin 7 days      · HTN:  · Continued norvasc, clonidine, labetalol      · DM2:  · On SSI  · DM2 education  · Minimal insulin use since admission. A1c is likely falsely elevated secondary to ESRD      DC planning/Dispo:   To home once she has outpatient hemodialysis slot     Diet:  DIET RENAL  DVT ppx:  heparin    Signed:  Mark Robles MD

## 2021-01-06 NOTE — PROGRESS NOTES
Problem: Falls - Risk of  Goal: *Absence of Falls  Description: Document Andi Mcgowan Fall Risk and appropriate interventions in the flowsheet. Outcome: Progressing Towards Goal  Note: Fall Risk Interventions:            Medication Interventions: Teach patient to arise slowly    Elimination Interventions: Call light in reach, Patient to call for help with toileting needs              Problem: Patient Education: Go to Patient Education Activity  Goal: Patient/Family Education  Outcome: Progressing Towards Goal     Problem: Diabetes Self-Management  Goal: *Disease process and treatment process  Description: Define diabetes and identify own type of diabetes; list 3 options for treating diabetes. Outcome: Progressing Towards Goal  Goal: *Incorporating nutritional management into lifestyle  Description: Describe effect of type, amount and timing of food on blood glucose; list 3 methods for planning meals. Outcome: Progressing Towards Goal  Goal: *Incorporating physical activity into lifestyle  Description: State effect of exercise on blood glucose levels. Outcome: Progressing Towards Goal  Goal: *Developing strategies to promote health/change behavior  Description: Define the ABC's of diabetes; identify appropriate screenings, schedule and personal plan for screenings. Outcome: Progressing Towards Goal  Goal: *Using medications safely  Description: State effect of diabetes medications on diabetes; name diabetes medication taking, action and side effects. Outcome: Progressing Towards Goal  Goal: *Monitoring blood glucose, interpreting and using results  Description: Identify recommended blood glucose targets  and personal targets. Outcome: Progressing Towards Goal  Goal: *Prevention, detection, treatment of acute complications  Description: List symptoms of hyper- and hypoglycemia; describe how to treat low blood sugar and actions for lowering  high blood glucose level.   Outcome: Progressing Towards Goal  Goal: *Prevention, detection and treatment of chronic complications  Description: Define the natural course of diabetes and describe the relationship of blood glucose levels to long term complications of diabetes. Outcome: Progressing Towards Goal  Goal: *Developing strategies to address psychosocial issues  Description: Describe feelings about living with diabetes; identify support needed and support network  Outcome: Progressing Towards Goal  Goal: *Insulin pump training  Outcome: Progressing Towards Goal  Goal: *Sick day guidelines  Outcome: Progressing Towards Goal  Goal: *Patient Specific Goal (EDIT GOAL, INSERT TEXT)  Outcome: Progressing Towards Goal     Problem: Patient Education: Go to Patient Education Activity  Goal: Patient/Family Education  Outcome: Progressing Towards Goal     Problem: Acute Renal Failure: Discharge Outcomes  Goal: *Optimal pain control at patient's stated goal  Outcome: Progressing Towards Goal  Goal: *Urinary output within identified parameters  Outcome: Progressing Towards Goal  Goal: *Hemodynamically stable  Outcome: Progressing Towards Goal  Goal: *Tolerating diet  Outcome: Progressing Towards Goal  Goal: *Lab values stabilized  Outcome: Progressing Towards Goal  Goal: *Verbalizes understanding and describes medication purposes and frequencies  Outcome: Progressing Towards Goal  Goal: *Medication reconciliation  Outcome: Progressing Towards Goal     Problem: Pressure Injury - Risk of  Goal: *Prevention of pressure injury  Description: Document Oneal Scale and appropriate interventions in the flowsheet.   Outcome: Progressing Towards Goal     Problem: Patient Education: Go to Patient Education Activity  Goal: Patient/Family Education  Outcome: Progressing Towards Goal

## 2021-01-07 LAB
ALBUMIN SERPL-MCNC: 2.2 G/DL (ref 3.5–5)
ALBUMIN UR ELPH-MCNC: 969.6 MG/DL
ALBUMIN/GLOB SERPL: 0.4 {RATIO} (ref 1.2–3.5)
ALBUMIN/GLOB SERPL: 0.8 {RATIO} (ref 1.1–2.2)
ALP SERPL-CCNC: 103 U/L (ref 50–136)
ALPHA1 GLOB 24H UR ELPH-MCNC: 203 MG/DL
ALPHA2 GLOB SERPL ELPH-MCNC: 204.8 MG/DL
ALT SERPL-CCNC: 10 U/L (ref 12–65)
ANION GAP SERPL CALC-SCNC: 6 MMOL/L (ref 7–16)
AST SERPL-CCNC: 13 U/L (ref 15–37)
B-GLOBULIN UR QL ELPH: 279.8 MG/DL
BASOPHILS # BLD: 0.1 K/UL (ref 0–0.2)
BASOPHILS NFR BLD: 2 % (ref 0–2)
BILIRUB SERPL-MCNC: 0.4 MG/DL (ref 0.2–1.1)
BUN SERPL-MCNC: 34 MG/DL (ref 6–23)
CALCIUM SERPL-MCNC: 9.3 MG/DL (ref 8.3–10.4)
CHLORIDE SERPL-SCNC: 102 MMOL/L (ref 98–107)
CO2 SERPL-SCNC: 28 MMOL/L (ref 21–32)
COLLECT DURATION TIME UR: 24 HR
CREAT SERPL-MCNC: 7.96 MG/DL (ref 0.6–1)
DIFFERENTIAL METHOD BLD: ABNORMAL
EOSINOPHIL # BLD: 0.1 K/UL (ref 0–0.8)
EOSINOPHIL NFR BLD: 2 % (ref 0.5–7.8)
ERYTHROCYTE [DISTWIDTH] IN BLOOD BY AUTOMATED COUNT: 13.9 % (ref 11.9–14.6)
GAMMA GLOB MFR UR ELPH: 458.8 MG/DL
GLOBULIN SER CALC-MCNC: 5 G/DL (ref 2.3–3.5)
GLUCOSE BLD STRIP.AUTO-MCNC: 115 MG/DL (ref 65–100)
GLUCOSE BLD STRIP.AUTO-MCNC: 141 MG/DL (ref 65–100)
GLUCOSE BLD STRIP.AUTO-MCNC: 146 MG/DL (ref 65–100)
GLUCOSE SERPL-MCNC: 125 MG/DL (ref 65–100)
HCT VFR BLD AUTO: 28.5 % (ref 35.8–46.3)
HGB BLD-MCNC: 8.9 G/DL (ref 11.7–15.4)
IMM GRANULOCYTES # BLD AUTO: 0 K/UL (ref 0–0.5)
IMM GRANULOCYTES NFR BLD AUTO: 0 % (ref 0–5)
LYMPHOCYTES # BLD: 1.8 K/UL (ref 0.5–4.6)
LYMPHOCYTES NFR BLD: 29 % (ref 13–44)
M PROTEIN UR-MCNC: ABNORMAL MG/DL
MCH RBC QN AUTO: 25.4 PG (ref 26.1–32.9)
MCHC RBC AUTO-ENTMCNC: 31.2 G/DL (ref 31.4–35)
MCV RBC AUTO: 81.2 FL (ref 79.6–97.8)
MONOCYTES # BLD: 0.7 K/UL (ref 0.1–1.3)
MONOCYTES NFR BLD: 11 % (ref 4–12)
NEUTS SEG # BLD: 3.4 K/UL (ref 1.7–8.2)
NEUTS SEG NFR BLD: 56 % (ref 43–78)
NRBC # BLD: 0 K/UL (ref 0–0.2)
PHOSPHATE SERPL-MCNC: 5.7 MG/DL (ref 2.5–4.5)
PLATELET # BLD AUTO: 140 K/UL (ref 150–450)
PMV BLD AUTO: 9.9 FL (ref 9.4–12.3)
POTASSIUM SERPL-SCNC: 3.9 MMOL/L (ref 3.5–5.1)
PROT 24H UR-MRATE: ABNORMAL MG/24HR
PROT PATTERN UR ELPH-IMP: ABNORMAL
PROT SERPL-MCNC: 7.2 G/DL (ref 6.3–8.2)
PROT UR-MCNC: 2116 MG/DL
RBC # BLD AUTO: 3.51 M/UL (ref 4.05–5.2)
SODIUM SERPL-SCNC: 136 MMOL/L (ref 136–145)
SPECIMEN VOL ?TM UR: 700 ML
WBC # BLD AUTO: 6.1 K/UL (ref 4.3–11.1)

## 2021-01-07 PROCEDURE — 82962 GLUCOSE BLOOD TEST: CPT

## 2021-01-07 PROCEDURE — 74011250637 HC RX REV CODE- 250/637: Performed by: NURSE PRACTITIONER

## 2021-01-07 PROCEDURE — 36415 COLL VENOUS BLD VENIPUNCTURE: CPT

## 2021-01-07 PROCEDURE — 85025 COMPLETE CBC W/AUTO DIFF WBC: CPT

## 2021-01-07 PROCEDURE — 65660000000 HC RM CCU STEPDOWN

## 2021-01-07 PROCEDURE — 84100 ASSAY OF PHOSPHORUS: CPT

## 2021-01-07 PROCEDURE — 2709999900 HC NON-CHARGEABLE SUPPLY

## 2021-01-07 PROCEDURE — 74011250637 HC RX REV CODE- 250/637: Performed by: INTERNAL MEDICINE

## 2021-01-07 PROCEDURE — 80053 COMPREHEN METABOLIC PANEL: CPT

## 2021-01-07 PROCEDURE — 74011250636 HC RX REV CODE- 250/636: Performed by: INTERNAL MEDICINE

## 2021-01-07 PROCEDURE — 90935 HEMODIALYSIS ONE EVALUATION: CPT

## 2021-01-07 PROCEDURE — 74011250637 HC RX REV CODE- 250/637: Performed by: STUDENT IN AN ORGANIZED HEALTH CARE EDUCATION/TRAINING PROGRAM

## 2021-01-07 PROCEDURE — 74011250636 HC RX REV CODE- 250/636: Performed by: STUDENT IN AN ORGANIZED HEALTH CARE EDUCATION/TRAINING PROGRAM

## 2021-01-07 RX ADMIN — LABETALOL HYDROCHLORIDE 300 MG: 200 TABLET, FILM COATED ORAL at 21:44

## 2021-01-07 RX ADMIN — ACETAMINOPHEN 650 MG: 325 TABLET, FILM COATED ORAL at 04:41

## 2021-01-07 RX ADMIN — AMLODIPINE BESYLATE 5 MG: 5 TABLET ORAL at 12:01

## 2021-01-07 RX ADMIN — HEPARIN SODIUM 5000 UNITS: 5000 INJECTION INTRAVENOUS; SUBCUTANEOUS at 04:42

## 2021-01-07 RX ADMIN — AMLODIPINE BESYLATE 5 MG: 5 TABLET ORAL at 17:02

## 2021-01-07 RX ADMIN — LABETALOL HYDROCHLORIDE 300 MG: 200 TABLET, FILM COATED ORAL at 17:02

## 2021-01-07 RX ADMIN — HEPARIN SODIUM 5000 UNITS: 5000 INJECTION INTRAVENOUS; SUBCUTANEOUS at 21:44

## 2021-01-07 RX ADMIN — CLONIDINE HYDROCHLORIDE 0.2 MG: 0.1 TABLET ORAL at 21:44

## 2021-01-07 RX ADMIN — SEVELAMER CARBONATE 800 MG: 800 TABLET, FILM COATED ORAL at 12:01

## 2021-01-07 RX ADMIN — SEVELAMER CARBONATE 800 MG: 800 TABLET, FILM COATED ORAL at 17:02

## 2021-01-07 RX ADMIN — HEPARIN SODIUM 5000 UNITS: 5000 INJECTION INTRAVENOUS; SUBCUTANEOUS at 12:07

## 2021-01-07 RX ADMIN — CLONIDINE HYDROCHLORIDE 0.2 MG: 0.1 TABLET ORAL at 12:01

## 2021-01-07 RX ADMIN — HEPARIN SODIUM 5000 UNITS: 1000 INJECTION INTRAVENOUS; SUBCUTANEOUS at 07:23

## 2021-01-07 RX ADMIN — Medication 10 ML: at 04:43

## 2021-01-07 RX ADMIN — FAMOTIDINE 20 MG: 20 TABLET, FILM COATED ORAL at 12:01

## 2021-01-07 RX ADMIN — Medication 10 ML: at 13:04

## 2021-01-07 RX ADMIN — Medication 10 ML: at 21:45

## 2021-01-07 NOTE — PROGRESS NOTES
[facility]    Follow-Up on: JOSSELIN on CKD     HPI: Pt seen and examined on HD dialyzing via right TCC- 350 Qb, UF 2100 tolerating dialysis no new complaints.      ROS:  General: no fever/chills, appetite ok  CV: no CP  Lung: no SOB, no cough  GI: no N/V/D  Ext: no edema     Current Facility-Administered Medications   Medication Dose Route Frequency    amLODIPine (NORVASC) tablet 5 mg  5 mg Oral BID    hydrALAZINE (APRESOLINE) tablet 50 mg  50 mg Oral Q6H PRN    senna-docusate (PERICOLACE) 8.6-50 mg per tablet 1 Tab  1 Tab Oral DAILY PRN    heparin (porcine) 1,000 unit/mL injection 5,000 Units  5,000 Units Hemodialysis DIALYSIS PRN    midazolam (VERSED) injection 0.5-2 mg  0.5-2 mg IntraVENous Multiple    epoetin marianne-epbx (RETACRIT) injection 10,000 Units  10,000 Units SubCUTAneous Q7D    midazolam (VERSED) injection 0.25-2 mg  0.25-2 mg IntraVENous Multiple    diphenhydrAMINE (BENADRYL) injection 50 mg  50 mg IntraVENous Multiple    HYDROcodone-acetaminophen (NORCO)  mg tablet 1 Tab  1 Tab Oral Q6H PRN    labetaloL (NORMODYNE;TRANDATE) injection 20 mg  20 mg IntraVENous Q1H PRN    cloNIDine HCL (CATAPRES) tablet 0.2 mg  0.2 mg Oral BID    sevelamer carbonate (RENVELA) tab 800 mg  800 mg Oral TID WITH MEALS    labetaloL (NORMODYNE) tablet 300 mg  300 mg Oral TID    famotidine (PEPCID) tablet 20 mg  20 mg Oral DAILY    insulin lispro (HUMALOG) injection 0-10 Units  0-10 Units SubCUTAneous AC&HS    lip protectant (BLISTEX) ointment 1 Each  1 Each Topical PRN    alum-mag hydroxide-simeth (MYLANTA) oral suspension 30 mL  30 mL Oral Q4H PRN    heparin (porcine) injection 5,000 Units  5,000 Units SubCUTAneous Q8H    sodium chloride (NS) flush 5-40 mL  5-40 mL IntraVENous Q8H    sodium chloride (NS) flush 5-40 mL  5-40 mL IntraVENous PRN    acetaminophen (TYLENOL) tablet 650 mg  650 mg Oral Q6H PRN    Or    acetaminophen (TYLENOL) suppository 650 mg  650 mg Rectal Q6H PRN    polyethylene glycol (MIRALAX) packet 17 g  17 g Oral DAILY PRN    promethazine (PHENERGAN) tablet 12.5 mg  12.5 mg Oral Q6H PRN       Exam:  Vitals:    01/07/21 0113 01/07/21 0521 01/07/21 0718 01/07/21 0735   BP: (!) 150/99 (!) 151/99 (!) 155/81 (!) 145/104   Pulse: 72 70 91 86   Resp: 17 16     Temp: 97.6 °F (36.4 °C) 98 °F (36.7 °C)     SpO2: 96% 100%     Weight:       Height:           No intake or output data in the 24 hours ending 01/07/21 0737  PE:  GEN - in no distress, alert and oriented  CV - regular rate. S1, S2, no murmur, no rub  Lung - clear bilaterally  Abd - soft, nontender  Ext - no edema  Access: right TCC- intact     Labs  Recent Labs     01/06/21  0541   WBC 6.3   HGB 9.0*   HCT 28.7*   *     Recent Labs     01/06/21  0541 01/05/21  0539    137   K 3.8 4.3    103   CO2 29 26   BUN 27* 42*   CREA 6.96* 9.82*   * 122*   CA 9.0 9.6   PHOS 4.9* 6.7*     No results for input(s): PH, PCO2, PO2, PCO2 in the last 72 hours. Problem List:  Patient Active Problem List    Diagnosis Date Noted    Acute renal failure on dialysis [facility]) 12/31/2020    Acute renal failure (ARF) [address] [address] 12/24/2020    Elevated procalcitonin 12/24/2020    Obesity 12/24/2020    Hx of essential hypertension 12/24/2020    Microcytic anemia 12/24/2020       Issues Addressed By Nephrology:    Plan:  1. JOSSELIN /ESRD- Pt continues to get education about dialysis and kidney failure and what to expect. recommend she read up on dialysis and transplant at [email or web address].   2. S/p Renal bx c/w collapsing glomerulopathy   3. First dialysis 12/24  4. [address] placed 12/30/21  5. Disposition awaiting outpt slot under JOSSELIN  6.  Anemia on alie

## 2021-01-07 NOTE — DIABETES MGMT
Patient's blood glucose ranged 110-178 yesterday with patient receiving Humalog 2 units. Blood glucose 125 in lab work this morning. Current regimen Humalog SSI. Control stable at this time. Hgb 8.9. Creat 7.96. GFR 7. Pt will need prescription for glucometer and glucometer supplies at discharge so that the patient may obtain the meter covered by their insurance. Patient prefers insulin pens. Patient will need prescription for insulin pens and pen needles at discharge.

## 2021-01-07 NOTE — PROGRESS NOTES
TC from pt. She has received her new insurance member number ([id number]). Information faxed to [address] [place] [address] Nneka Barton at [facility] Admissions. MAINE spoke with her via phone to confirm she has received the information. She has submitted it to their insurance approval office and will notify MAINE once a chair time is assigned. MAINE informed her that the pt is medically ready for dc as soon as this info is provided.

## 2021-01-07 NOTE — DIALYSIS
TRANSFER IN - DIALYSIS    Received patient in dialysis unit  from [facility] for ordered procedure. Consent verified for renal replacement therapy. Patient alert and vital signs stable. /81 P 91    Hemodialysis initiated using CVC, right IJ. Aspirated and flushed both ports without difficulty. Dressing clean, dry and intact. Machine settings per MD order. Heparin 2000 unit bolus and 1000 units/hr. Will monitor during treatment.

## 2021-01-07 NOTE — DIALYSIS
TRANSFER OUT- DIALYSIS    Hemodialysis treatment terminated 40 minutes early due to clotted system. Patient may benefit from additional heparin with dialysis. Patient alert and VS stable. /96  P  79       1.1 Kgs removed. Flushed both ports with 10 mL of NS.  CVC dressing clean, dry, and intact, tego caps intact, bilateral lumens wrapped with 4x4 gauze. Patient to 714 after dialysis.

## 2021-01-07 NOTE — PROGRESS NOTES
Problem: Falls - Risk of  Goal: *Absence of Falls  Description: Document Jose Shove Fall Risk and appropriate interventions in the flowsheet. Outcome: Progressing Towards Goal  Note: Fall Risk Interventions:            Medication Interventions: Patient to call before getting OOB, Teach patient to arise slowly    Elimination Interventions: Call light in reach              Problem: Patient Education: Go to Patient Education Activity  Goal: Patient/Family Education  Outcome: Progressing Towards Goal     Problem: Diabetes Self-Management  Goal: *Disease process and treatment process  Description: Define diabetes and identify own type of diabetes; list 3 options for treating diabetes. Outcome: Progressing Towards Goal  Goal: *Incorporating nutritional management into lifestyle  Description: Describe effect of type, amount and timing of food on blood glucose; list 3 methods for planning meals. Outcome: Progressing Towards Goal  Goal: *Incorporating physical activity into lifestyle  Description: State effect of exercise on blood glucose levels. Outcome: Progressing Towards Goal  Goal: *Developing strategies to promote health/change behavior  Description: Define the ABC's of diabetes; identify appropriate screenings, schedule and personal plan for screenings. Outcome: Progressing Towards Goal  Goal: *Using medications safely  Description: State effect of diabetes medications on diabetes; name diabetes medication taking, action and side effects. Outcome: Progressing Towards Goal  Goal: *Monitoring blood glucose, interpreting and using results  Description: Identify recommended blood glucose targets  and personal targets. Outcome: Progressing Towards Goal  Goal: *Prevention, detection, treatment of acute complications  Description: List symptoms of hyper- and hypoglycemia; describe how to treat low blood sugar and actions for lowering  high blood glucose level.   Outcome: Progressing Towards Goal  Goal: *Prevention, detection and treatment of chronic complications  Description: Define the natural course of diabetes and describe the relationship of blood glucose levels to long term complications of diabetes. Outcome: Progressing Towards Goal  Goal: *Developing strategies to address psychosocial issues  Description: Describe feelings about living with diabetes; identify support needed and support network  Outcome: Progressing Towards Goal  Goal: *Insulin pump training  Outcome: Progressing Towards Goal  Goal: *Sick day guidelines  Outcome: Progressing Towards Goal  Goal: *Patient Specific Goal (EDIT GOAL, INSERT TEXT)  Outcome: Progressing Towards Goal     Problem: Patient Education: Go to Patient Education Activity  Goal: Patient/Family Education  Outcome: Progressing Towards Goal     Problem: Acute Renal Failure: Discharge Outcomes  Goal: *Optimal pain control at patient's stated goal  Outcome: Progressing Towards Goal  Goal: *Urinary output within identified parameters  Outcome: Progressing Towards Goal  Goal: *Hemodynamically stable  Outcome: Progressing Towards Goal  Goal: *Tolerating diet  Outcome: Progressing Towards Goal  Goal: *Lab values stabilized  Outcome: Progressing Towards Goal  Goal: *Verbalizes understanding and describes medication purposes and frequencies  Outcome: Progressing Towards Goal  Goal: *Medication reconciliation  Outcome: Progressing Towards Goal     Problem: Pressure Injury - Risk of  Goal: *Prevention of pressure injury  Description: Document Oneal Scale and appropriate interventions in the flowsheet.   Outcome: Progressing Towards Goal     Problem: Patient Education: Go to Patient Education Activity  Goal: Patient/Family Education  Outcome: Progressing Towards Goal

## 2021-01-08 LAB
ALBUMIN SERPL-MCNC: 2.1 G/DL (ref 3.5–5)
ALBUMIN/GLOB SERPL: 0.4 {RATIO} (ref 1.2–3.5)
ALP SERPL-CCNC: 104 U/L (ref 50–136)
ALT SERPL-CCNC: 12 U/L (ref 12–65)
ANION GAP SERPL CALC-SCNC: 7 MMOL/L (ref 7–16)
AST SERPL-CCNC: 14 U/L (ref 15–37)
BASOPHILS # BLD: 0.1 K/UL (ref 0–0.2)
BASOPHILS NFR BLD: 1 % (ref 0–2)
BILIRUB SERPL-MCNC: 0.3 MG/DL (ref 0.2–1.1)
BUN SERPL-MCNC: 24 MG/DL (ref 6–23)
CALCIUM SERPL-MCNC: 9.1 MG/DL (ref 8.3–10.4)
CHLORIDE SERPL-SCNC: 104 MMOL/L (ref 98–107)
CO2 SERPL-SCNC: 26 MMOL/L (ref 21–32)
CREAT SERPL-MCNC: 6.26 MG/DL (ref 0.6–1)
DIFFERENTIAL METHOD BLD: ABNORMAL
EOSINOPHIL # BLD: 0.2 K/UL (ref 0–0.8)
EOSINOPHIL NFR BLD: 3 % (ref 0.5–7.8)
ERYTHROCYTE [DISTWIDTH] IN BLOOD BY AUTOMATED COUNT: 13.8 % (ref 11.9–14.6)
GLOBULIN SER CALC-MCNC: 5.1 G/DL (ref 2.3–3.5)
GLUCOSE BLD STRIP.AUTO-MCNC: 120 MG/DL (ref 65–100)
GLUCOSE BLD STRIP.AUTO-MCNC: 131 MG/DL (ref 65–100)
GLUCOSE BLD STRIP.AUTO-MCNC: 137 MG/DL (ref 65–100)
GLUCOSE BLD STRIP.AUTO-MCNC: 144 MG/DL (ref 65–100)
GLUCOSE SERPL-MCNC: 132 MG/DL (ref 65–100)
HCT VFR BLD AUTO: 28.9 % (ref 35.8–46.3)
HGB BLD-MCNC: 8.8 G/DL (ref 11.7–15.4)
IMM GRANULOCYTES # BLD AUTO: 0.1 K/UL (ref 0–0.5)
IMM GRANULOCYTES NFR BLD AUTO: 1 % (ref 0–5)
LYMPHOCYTES # BLD: 1.9 K/UL (ref 0.5–4.6)
LYMPHOCYTES NFR BLD: 28 % (ref 13–44)
MCH RBC QN AUTO: 25.2 PG (ref 26.1–32.9)
MCHC RBC AUTO-ENTMCNC: 30.4 G/DL (ref 31.4–35)
MCV RBC AUTO: 82.8 FL (ref 79.6–97.8)
MONOCYTES # BLD: 0.7 K/UL (ref 0.1–1.3)
MONOCYTES NFR BLD: 11 % (ref 4–12)
NEUTS SEG # BLD: 3.7 K/UL (ref 1.7–8.2)
NEUTS SEG NFR BLD: 56 % (ref 43–78)
NRBC # BLD: 0 K/UL (ref 0–0.2)
PHOSPHATE SERPL-MCNC: 4.2 MG/DL (ref 2.5–4.5)
PLATELET # BLD AUTO: 111 K/UL (ref 150–450)
PLATELET COMMENTS,PCOM: ABNORMAL
PMV BLD AUTO: 10.1 FL (ref 9.4–12.3)
POTASSIUM SERPL-SCNC: 4.1 MMOL/L (ref 3.5–5.1)
PROT SERPL-MCNC: 7.2 G/DL (ref 6.3–8.2)
RBC # BLD AUTO: 3.49 M/UL (ref 4.05–5.2)
RBC MORPH BLD: ABNORMAL
SODIUM SERPL-SCNC: 137 MMOL/L (ref 136–145)
WBC # BLD AUTO: 6.7 K/UL (ref 4.3–11.1)
WBC MORPH BLD: ABNORMAL

## 2021-01-08 PROCEDURE — 82962 GLUCOSE BLOOD TEST: CPT

## 2021-01-08 PROCEDURE — 36415 COLL VENOUS BLD VENIPUNCTURE: CPT

## 2021-01-08 PROCEDURE — 84100 ASSAY OF PHOSPHORUS: CPT

## 2021-01-08 PROCEDURE — 74011250637 HC RX REV CODE- 250/637: Performed by: STUDENT IN AN ORGANIZED HEALTH CARE EDUCATION/TRAINING PROGRAM

## 2021-01-08 PROCEDURE — 74011250637 HC RX REV CODE- 250/637: Performed by: INTERNAL MEDICINE

## 2021-01-08 PROCEDURE — 80053 COMPREHEN METABOLIC PANEL: CPT

## 2021-01-08 PROCEDURE — 85025 COMPLETE CBC W/AUTO DIFF WBC: CPT

## 2021-01-08 PROCEDURE — 74011250637 HC RX REV CODE- 250/637: Performed by: NURSE PRACTITIONER

## 2021-01-08 PROCEDURE — 65660000000 HC RM CCU STEPDOWN

## 2021-01-08 PROCEDURE — 74011250636 HC RX REV CODE- 250/636: Performed by: STUDENT IN AN ORGANIZED HEALTH CARE EDUCATION/TRAINING PROGRAM

## 2021-01-08 RX ORDER — LABETALOL 200 MG/1
400 TABLET, FILM COATED ORAL 3 TIMES DAILY
Status: DISCONTINUED | OUTPATIENT
Start: 2021-01-08 | End: 2021-01-11 | Stop reason: HOSPADM

## 2021-01-08 RX ADMIN — Medication 5 ML: at 21:28

## 2021-01-08 RX ADMIN — AMLODIPINE BESYLATE 5 MG: 5 TABLET ORAL at 16:53

## 2021-01-08 RX ADMIN — ACETAMINOPHEN 650 MG: 325 TABLET, FILM COATED ORAL at 12:06

## 2021-01-08 RX ADMIN — HEPARIN SODIUM 5000 UNITS: 5000 INJECTION INTRAVENOUS; SUBCUTANEOUS at 12:06

## 2021-01-08 RX ADMIN — CLONIDINE HYDROCHLORIDE 0.2 MG: 0.1 TABLET ORAL at 21:28

## 2021-01-08 RX ADMIN — FAMOTIDINE 20 MG: 20 TABLET, FILM COATED ORAL at 07:55

## 2021-01-08 RX ADMIN — SEVELAMER CARBONATE 800 MG: 800 TABLET, FILM COATED ORAL at 07:55

## 2021-01-08 RX ADMIN — LABETALOL HYDROCHLORIDE 400 MG: 200 TABLET, FILM COATED ORAL at 21:28

## 2021-01-08 RX ADMIN — HEPARIN SODIUM 5000 UNITS: 5000 INJECTION INTRAVENOUS; SUBCUTANEOUS at 21:28

## 2021-01-08 RX ADMIN — HEPARIN SODIUM 5000 UNITS: 5000 INJECTION INTRAVENOUS; SUBCUTANEOUS at 05:16

## 2021-01-08 RX ADMIN — SEVELAMER CARBONATE 800 MG: 800 TABLET, FILM COATED ORAL at 12:06

## 2021-01-08 RX ADMIN — Medication 10 ML: at 13:51

## 2021-01-08 RX ADMIN — AMLODIPINE BESYLATE 5 MG: 5 TABLET ORAL at 07:56

## 2021-01-08 RX ADMIN — CLONIDINE HYDROCHLORIDE 0.2 MG: 0.1 TABLET ORAL at 07:55

## 2021-01-08 RX ADMIN — LABETALOL HYDROCHLORIDE 400 MG: 200 TABLET, FILM COATED ORAL at 16:53

## 2021-01-08 RX ADMIN — SEVELAMER CARBONATE 800 MG: 800 TABLET, FILM COATED ORAL at 16:53

## 2021-01-08 RX ADMIN — Medication 10 ML: at 05:18

## 2021-01-08 RX ADMIN — LABETALOL HYDROCHLORIDE 300 MG: 200 TABLET, FILM COATED ORAL at 07:54

## 2021-01-08 NOTE — PROGRESS NOTES
TC to [facility] re: status of referral. Ksenia Richards informed SW that the  is incorrect in the new insurance's system. She asked that SW have the pt call the insurance company to correct the information. SW notified pt and asked her to call. She verbalized understanding. 1350:  TC from pt informing SW that she had spoken with the insurance company and her  should now be correct in the system. TC to Tiffany at [facility] and updated her.   She will request that their insurance liaison resubmit the request.

## 2021-01-08 NOTE — PROGRESS NOTES
Reviewed notes for spiritual concerns    [facility]  MOTHER - ALEK    LIVES IN TRAVELERS [place]    ESRD - DIALYSIS  OBESITY            Will continue to follow closely during this admission    STAFF PROVIDING VERY COMPASSIONATE CARE!

## 2021-01-08 NOTE — PROGRESS NOTES
[facility]    Follow-Up on: JOSSELIN on CKD     HPI: Pt seen and examined, no new complaints.      ROS:  General: no fever/chills, appetite ok  CV: no CP  Lung: no SOB, no cough  GI: no N/V/D  Ext: no edema     Current Facility-Administered Medications   Medication Dose Route Frequency    amLODIPine (NORVASC) tablet 5 mg  5 mg Oral BID    hydrALAZINE (APRESOLINE) tablet 50 mg  50 mg Oral Q6H PRN    senna-docusate (PERICOLACE) 8.6-50 mg per tablet 1 Tab  1 Tab Oral DAILY PRN    heparin (porcine) 1,000 unit/mL injection 5,000 Units  5,000 Units Hemodialysis DIALYSIS PRN    midazolam (VERSED) injection 0.5-2 mg  0.5-2 mg IntraVENous Multiple    epoetin marianne-epbx (RETACRIT) injection 10,000 Units  10,000 Units SubCUTAneous Q7D    midazolam (VERSED) injection 0.25-2 mg  0.25-2 mg IntraVENous Multiple    diphenhydrAMINE (BENADRYL) injection 50 mg  50 mg IntraVENous Multiple    HYDROcodone-acetaminophen (NORCO)  mg tablet 1 Tab  1 Tab Oral Q6H PRN    labetaloL (NORMODYNE;TRANDATE) injection 20 mg  20 mg IntraVENous Q1H PRN    cloNIDine HCL (CATAPRES) tablet 0.2 mg  0.2 mg Oral BID    sevelamer carbonate (RENVELA) tab 800 mg  800 mg Oral TID WITH MEALS    labetaloL (NORMODYNE) tablet 300 mg  300 mg Oral TID    famotidine (PEPCID) tablet 20 mg  20 mg Oral DAILY    insulin lispro (HUMALOG) injection 0-10 Units  0-10 Units SubCUTAneous AC&HS    lip protectant (BLISTEX) ointment 1 Each  1 Each Topical PRN    alum-mag hydroxide-simeth (MYLANTA) oral suspension 30 mL  30 mL Oral Q4H PRN    heparin (porcine) injection 5,000 Units  5,000 Units SubCUTAneous Q8H    sodium chloride (NS) flush 5-40 mL  5-40 mL IntraVENous Q8H    sodium chloride (NS) flush 5-40 mL  5-40 mL IntraVENous PRN    acetaminophen (TYLENOL) tablet 650 mg  650 mg Oral Q6H PRN    Or    acetaminophen (TYLENOL) suppository 650 mg  650 mg Rectal Q6H PRN    polyethylene glycol (MIRALAX) packet 17 g  17 g Oral DAILY PRN    promethazine (PHENERGAN) tablet 12.5 mg  12.5 mg Oral Q6H PRN       Exam:  Vitals:    01/08/21 0000 01/08/21 0407 01/08/21 0725 01/08/21 1208   BP: 118/77 135/72 (!) 159/98 (!) 145/94   Pulse: 78 94 70 71   Resp: 19 20 19 18   Temp: 98.4 °F (36.9 °C) 98.1 °F (36.7 °C) 98.3 °F (36.8 °C) 98.2 °F (36.8 °C)   SpO2: 99% 97% 96% 98%   Weight:  123.4 kg (272 lb)     Height:           No intake or output data in the 24 hours ending 01/08/21 1401  PE:  GEN - in no distress, alert and oriented  CV - regular rate. S1, S2, no murmur, no rub  Lung - clear bilaterally  Abd - soft, nontender  Ext - no edema  Access: right TCC- intact     Labs  Recent Labs     01/08/21  0455 01/07/21  0605 01/06/21  0541   WBC 6.7 6.1 6.3   HGB 8.8* 8.9* 9.0*   HCT 28.9* 28.5* 28.7*   * 140* 148*     Recent Labs     01/08/21  0455 01/07/21  0605 01/06/21  0541    136 136   K 4.1 3.9 3.8    102 101   CO2 26 28 29   BUN 24* 34* 27*   CREA 6.26* 7.96* 6.96*   * 125* 110*   CA 9.1 9.3 9.0   PHOS 4.2 5.7* 4.9*     No results for input(s): PH, PCO2, PO2, PCO2 in the last 72 hours. Problem List:  Patient Active Problem List    Diagnosis Date Noted    Acute renal failure on dialysis [facility]) 12/31/2020    Acute renal failure (ARF) ([facility] [address] 12/24/2020    Elevated procalcitonin 12/24/2020    Obesity 12/24/2020    Hx of essential hypertension 12/24/2020    Microcytic anemia 12/24/2020       Issues Addressed By Nephrology:    Plan:  1. JOSSELIN /ESRD- Pt continues to get education about dialysis and kidney failure and what to expect. recommend she read up on dialysis and transplant at [email or web address].   2. S/p Renal bx c/w collapsing glomerulopathy FSGS; would try some high dose prednisone once pt agrees to the side-effects and risk benefit ratio  3. First dialysis 12/24  4. Marizol Medina 85 placed 12/30/21  5. Disposition awaiting outpt slot under JOSSELIN  6.  Anemia on alie

## 2021-01-08 NOTE — PROGRESS NOTES
Hospitalist Note     Admit Date:  2020 10:57 PM   Name:  Loulou Woodard   Age:  37 y.o.  :  1985   MRN:  [id number]   PCP:  Ky Orona MD  Treatment Team: Attending Provider: Chun Estes MD; Consulting Provider: Dillan Summers NP; Utilization Review: Julian Vallecillo RN; Tech: Desai ; Utilization Review: Ya Ragsdale; Care Manager: Chase Wing LMSW; Medical Assistant: Sonido Hopkins RN; Utilization Review: Louise Nieto Charge Nurse: Anca Moore Student Nurse: Toni Perez RN    HPI/Subjective:       Ms. Kerry Bryant is a 38 yo female with PMH of HTN, DM2, admitted with JOSSELIN. She has been seen by nephrology and required hemodialysis that likely will be needed longterm. TCC placed per IR. Completed course of empiric rocephin 7 days, urine cx skin jae. She is s/p renal biopsy path showing \"inflammation, collapsing glomerulopathy and hypertensive changes\", per nephrology she is not quite end stage disease but high risk. She is pending outpatient HD slot. Discharge is expected to home with mother. 21 ready to go home but still needs slot, some anorexia, no BM change, no dyspnea     1/3/21 patient sitting up in chair this morning. Feeling well. Currently awaiting outpatient hemodialysis slot. No chest pain no palpitations.   No acute events overnight. Patient clinically stable. Still awaiting chair placement for dialysis.     Objective:     Patient Vitals for the past 24 hrs:   Temp Pulse Resp BP SpO2   21 1550 97.8 °F (36.6 °C) 68 18 (!) 143/88 100 %   21 1208 98.2 °F (36.8 °C) 71 18 (!) 145/94 98 %   21 0725 98.3 °F (36.8 °C) 70 19 (!) 159/98 96 %   21 0407 98.1 °F (36.7 °C) 94 20 135/72 97 %   21 0000 98.4 °F (36.9 °C) 78 19 118/77 99 %   21 2049 98.5 °F (36.9 °C) 85 19 (!) 145/82 97 %     Oxygen Therapy  O2 Sat (%): 100 % (21 1550)  Pulse via Oximetry: 84 beats per minute (12/29/20 1133)  O2 Device: Room air (01/05/21 1155)  O2 Flow Rate (L/min): 3 l/min (12/30/20 0840)  ETCO2 (mmHg): 42 mmHg (12/29/20 1054)    Estimated body mass index is 45.26 kg/m² as calculated from the following:    Height as of this encounter: 5' 5\" (1.651 m). Weight as of this encounter: 123.4 kg (272 lb). No intake or output data in the 24 hours ending 01/08/21 1709    *Note that automatically entered I/Os may not be accurate; dependent on patient compliance with collection and accurate  by techs. General:    Well nourished. Alert. No distress, obese  CV:   RRR. No murmur, rub, or gallop. No edema   Lungs:   CTAB. No wheezing, rhonchi, or rales. Abdomen:   Soft, nontender, nondistended. Obese, present BS  Extremities: Warm and dry. Skin:     No rashes or jaundice. Neuro:  No gross focal deficits    Data Review:  I have reviewed all labs, meds, and studies from the last 24 hours:  Recent Results (from the past 24 hour(s))   GLUCOSE, POC    Collection Time: 01/07/21  8:51 PM   Result Value Ref Range    Glucose (POC) 146 (H) 65 - 100 mg/dL   CBC WITH AUTOMATED DIFF    Collection Time: 01/08/21  4:55 AM   Result Value Ref Range    WBC 6.7 4.3 - 11.1 K/uL    RBC 3.49 (L) 4.05 - 5.2 M/uL    HGB 8.8 (L) 11.7 - 15.4 g/dL    HCT 28.9 (L) 35.8 - 46.3 %    MCV 82.8 79.6 - 97.8 FL    MCH 25.2 (L) 26.1 - 32.9 PG    MCHC 30.4 (L) 31.4 - 35.0 g/dL    RDW 13.8 11.9 - 14.6 %    PLATELET 987 (L) 911 - 450 K/uL    MPV 10.1 9.4 - 12.3 FL    ABSOLUTE NRBC 0.00 0.0 - 0.2 K/uL    NEUTROPHILS 56 43 - 78 %    LYMPHOCYTES 28 13 - 44 %    MONOCYTES 11 4.0 - 12.0 %    EOSINOPHILS 3 0.5 - 7.8 %    BASOPHILS 1 0.0 - 2.0 %    IMMATURE GRANULOCYTES 1 0.0 - 5.0 %    ABS. NEUTROPHILS 3.7 1.7 - 8.2 K/UL    ABS. LYMPHOCYTES 1.9 0.5 - 4.6 K/UL    ABS. MONOCYTES 0.7 0.1 - 1.3 K/UL    ABS. EOSINOPHILS 0.2 0.0 - 0.8 K/UL    ABS. BASOPHILS 0.1 0.0 - 0.2 K/UL    ABS. IMM.  GRANS. 0.1 0.0 - 0.5 K/UL    RBC COMMENTS NORMOCYTIC/NORMOCHROMIC      WBC COMMENTS Result Confirmed By Smear      PLATELET COMMENTS DECREASED      DF AUTOMATED     METABOLIC PANEL, COMPREHENSIVE    Collection Time: 01/08/21  4:55 AM   Result Value Ref Range    Sodium 137 136 - 145 mmol/L    Potassium 4.1 3.5 - 5.1 mmol/L    Chloride 104 98 - 107 mmol/L    CO2 26 21 - 32 mmol/L    Anion gap 7 7 - 16 mmol/L    Glucose 132 (H) 65 - 100 mg/dL    BUN 24 (H) 6 - 23 MG/DL    Creatinine 6.26 (H) 0.6 - 1.0 MG/DL    GFR est AA 10 (L) >60 ml/min/1.73m2    GFR est non-AA 8 (L) >60 ml/min/1.73m2    Calcium 9.1 8.3 - 10.4 MG/DL    Bilirubin, total 0.3 0.2 - 1.1 MG/DL    ALT (SGPT) 12 12 - 65 U/L    AST (SGOT) 14 (L) 15 - 37 U/L    Alk.  phosphatase 104 50 - 136 U/L    Protein, total 7.2 6.3 - 8.2 g/dL    Albumin 2.1 (L) 3.5 - 5.0 g/dL    Globulin 5.1 (H) 2.3 - 3.5 g/dL    A-G Ratio 0.4 (L) 1.2 - 3.5     PHOSPHORUS    Collection Time: 01/08/21  4:55 AM   Result Value Ref Range    Phosphorus 4.2 2.5 - 4.5 MG/DL   GLUCOSE, POC    Collection Time: 01/08/21  7:21 AM   Result Value Ref Range    Glucose (POC) 144 (H) 65 - 100 mg/dL   GLUCOSE, POC    Collection Time: 01/08/21 11:00 AM   Result Value Ref Range    Glucose (POC) 137 (H) 65 - 100 mg/dL   GLUCOSE, POC    Collection Time: 01/08/21  4:04 PM   Result Value Ref Range    Glucose (POC) 131 (H) 65 - 100 mg/dL        Current Meds:  Current Facility-Administered Medications   Medication Dose Route Frequency    labetaloL (NORMODYNE) tablet 400 mg  400 mg Oral TID    amLODIPine (NORVASC) tablet 5 mg  5 mg Oral BID    hydrALAZINE (APRESOLINE) tablet 50 mg  50 mg Oral Q6H PRN    senna-docusate (PERICOLACE) 8.6-50 mg per tablet 1 Tab  1 Tab Oral DAILY PRN    heparin (porcine) 1,000 unit/mL injection 5,000 Units  5,000 Units Hemodialysis DIALYSIS PRN    midazolam (VERSED) injection 0.5-2 mg  0.5-2 mg IntraVENous Multiple    epoetin marianne-epbx (RETACRIT) injection 10,000 Units  10,000 Units SubCUTAneous Q7D    midazolam (VERSED) injection 0.25-2 mg  0.25-2 mg IntraVENous Multiple    diphenhydrAMINE (BENADRYL) injection 50 mg  50 mg IntraVENous Multiple    HYDROcodone-acetaminophen (NORCO)  mg tablet 1 Tab  1 Tab Oral Q6H PRN    labetaloL (NORMODYNE;TRANDATE) injection 20 mg  20 mg IntraVENous Q1H PRN    cloNIDine HCL (CATAPRES) tablet 0.2 mg  0.2 mg Oral BID    sevelamer carbonate (RENVELA) tab 800 mg  800 mg Oral TID WITH MEALS    famotidine (PEPCID) tablet 20 mg  20 mg Oral DAILY    insulin lispro (HUMALOG) injection 0-10 Units  0-10 Units SubCUTAneous AC&HS    lip protectant (BLISTEX) ointment 1 Each  1 Each Topical PRN    alum-mag hydroxide-simeth (MYLANTA) oral suspension 30 mL  30 mL Oral Q4H PRN    heparin (porcine) injection 5,000 Units  5,000 Units SubCUTAneous Q8H    sodium chloride (NS) flush 5-40 mL  5-40 mL IntraVENous Q8H    sodium chloride (NS) flush 5-40 mL  5-40 mL IntraVENous PRN    acetaminophen (TYLENOL) tablet 650 mg  650 mg Oral Q6H PRN    Or    acetaminophen (TYLENOL) suppository 650 mg  650 mg Rectal Q6H PRN    polyethylene glycol (MIRALAX) packet 17 g  17 g Oral DAILY PRN    promethazine (PHENERGAN) tablet 12.5 mg  12.5 mg Oral Q6H PRN       Other Studies:  No results found for this visit on 12/23/20. No results found. All Micro Results     Procedure Component Value Units Date/Time    CULTURE, URINE [[id number]] Collected: 12/24/20 0016    Order Status: Completed Specimen: Cath Urine Updated: 12/26/20 0719     Special Requests: NO SPECIAL REQUESTS        Culture result:       >100,000 COLONIES/mL MIXED SKIN AZEEM ISOLATED                  THREE OR MORE TYPES OF ORGANISMS ARE PRESENT. THIS IS INDICATIVE OF CONTAMINATION DUE TO IMPROPER COLLECTION TECHNIQUE. PLEASE REPEAT COLLECTION UNLESS PATIENT HAS STARTED ANTIBIOTIC TREATMENT.                 SARS-CoV-2 Lab Results  \"Novel Coronavirus\" Test: No results found for: COV2NT   \"Emergent Disease\" Test: No results found for: EDPR  \"SARS-COV-2\" Test: No results found for: XGCOVT  Rapid Test:   Lab Results   Component Value Date/Time    COVR Not detected 12/24/2020 12:56 AM            Assessment and Plan:     Hospital Problems as of 1/8/2021 Never Reviewed          Codes Class Noted - Resolved POA    * (Principal) Acute renal failure on dialysis [facility]) ICD-10-CM: N17.9, Z99.2  ICD-9-CM: 584.9, V45.11  12/31/2020 - Present Yes        Acute renal failure (ARF) ([facility] [address] ICD-10-CM: N17.9  ICD-9-CM: 584.9  12/24/2020 - Present Unknown        Elevated procalcitonin ICD-10-CM: R79.89  ICD-9-CM: 790.99  12/24/2020 - Present Unknown        Obesity ICD-10-CM: E66.9  ICD-9-CM: 278.00  12/24/2020 - Present Unknown        Hx of essential hypertension ICD-10-CM: Z86.79  ICD-9-CM: V12.59  12/24/2020 - Present Unknown        Microcytic anemia ICD-10-CM: D50.9  ICD-9-CM: 280.9  12/24/2020 - Present Unknown              Plan:      · New onset JOSSELIN progressed to CKD, high risk for ESRD-  on new hemodialysis:  · can discharge once has HD slot  · TCC in place   · Completed course of empiric rocephin 7 days      · HTN:  · Continued norvasc, clonidine, labetalol      · DM2:  · On SSI  · DM2 education  · Minimal insulin use since admission. A1c is likely falsely elevated secondary to ESRD      DC planning/Dispo:   To home once she has outpatient hemodialysis slot     Diet:  DIET RENAL  DVT ppx:  heparin    Signed:  Wil Austin MD

## 2021-01-08 NOTE — PROGRESS NOTES
Hospitalist Note     Admit Date:  2020 10:57 PM   Name:  Geovanna Merchant   Age:  37 y.o.  :  1985   MRN:  [id number]   PCP:  Davin Bro MD  Treatment Team: Attending Provider: Lorelei Barrientos MD; Consulting Provider: Arianna Chavez NP; Utilization Review: Ania Quintana RN; Tech: Fortino Kamila; Utilization Review: Tracy Cameron; Care Manager: Donovan Mayer LM; Medical Assistant: Naa Ryan RN; Utilization Review: Eulalia Nieto Charge Nurse: [place] Student Nurse: Ronald Clark RN    HPI/Subjective:       Ms. Gissell Renee is a 38 yo female with PMH of HTN, DM2, admitted with JOSSELIN. She has been seen by nephrology and required hemodialysis that likely will be needed longterm. TCC placed per IR. Completed course of empiric rocephin 7 days, urine cx skin jae. She is s/p renal biopsy path showing \"inflammation, collapsing glomerulopathy and hypertensive changes\", per nephrology she is not quite end stage disease but high risk. She is pending outpatient HD slot. Discharge is expected to home with mother. 21 ready to go home but still needs slot, some anorexia, no BM change, no dyspnea     1/3/21 patient sitting up in chair this morning. Feeling well. Currently awaiting outpatient hemodialysis slot. No chest pain no palpitations.   No acute events overnight. Patient clinically stable. Still awaiting chair placement for dialysis. BP elevated    Objective:     Patient Vitals for the past 24 hrs:   Temp Pulse Resp BP SpO2   21 1550 97.8 °F (36.6 °C) 68 18 (!) 143/88 100 %   21 1208 98.2 °F (36.8 °C) 71 18 (!) 145/94 98 %   21 0725 98.3 °F (36.8 °C) 70 19 (!) 159/98 96 %   21 0407 98.1 °F (36.7 °C) 94 20 135/72 97 %   21 0000 98.4 °F (36.9 °C) 78 19 118/77 99 %   21 2049 98.5 °F (36.9 °C) 85 19 (!) 145/82 97 %     Oxygen Therapy  O2 Sat (%): 100 % (21 1550)  Pulse via Oximetry: 84 beats per minute (12/29/20 1133)  O2 Device: Room air (01/05/21 1155)  O2 Flow Rate (L/min): 3 l/min (12/30/20 0840)  ETCO2 (mmHg): 42 mmHg (12/29/20 1054)    Estimated body mass index is 45.26 kg/m² as calculated from the following:    Height as of this encounter: 5' 5\" (1.651 m). Weight as of this encounter: 123.4 kg (272 lb). No intake or output data in the 24 hours ending 01/08/21 1710    *Note that automatically entered I/Os may not be accurate; dependent on patient compliance with collection and accurate  by techs. General:    Well nourished. Alert. No distress, obese  CV:   RRR. No murmur, rub, or gallop. No edema   Lungs:   CTAB. No wheezing, rhonchi, or rales. Abdomen:   Soft, nontender, nondistended. Obese, present BS  Extremities: Warm and dry. Skin:     No rashes or jaundice. Neuro:  No gross focal deficits    Data Review:  I have reviewed all labs, meds, and studies from the last 24 hours:  Recent Results (from the past 24 hour(s))   GLUCOSE, POC    Collection Time: 01/07/21  8:51 PM   Result Value Ref Range    Glucose (POC) 146 (H) 65 - 100 mg/dL   CBC WITH AUTOMATED DIFF    Collection Time: 01/08/21  4:55 AM   Result Value Ref Range    WBC 6.7 4.3 - 11.1 K/uL    RBC 3.49 (L) 4.05 - 5.2 M/uL    HGB 8.8 (L) 11.7 - 15.4 g/dL    HCT 28.9 (L) 35.8 - 46.3 %    MCV 82.8 79.6 - 97.8 FL    MCH 25.2 (L) 26.1 - 32.9 PG    MCHC 30.4 (L) 31.4 - 35.0 g/dL    RDW 13.8 11.9 - 14.6 %    PLATELET 112 (L) 992 - 450 K/uL    MPV 10.1 9.4 - 12.3 FL    ABSOLUTE NRBC 0.00 0.0 - 0.2 K/uL    NEUTROPHILS 56 43 - 78 %    LYMPHOCYTES 28 13 - 44 %    MONOCYTES 11 4.0 - 12.0 %    EOSINOPHILS 3 0.5 - 7.8 %    BASOPHILS 1 0.0 - 2.0 %    IMMATURE GRANULOCYTES 1 0.0 - 5.0 %    ABS. NEUTROPHILS 3.7 1.7 - 8.2 K/UL    ABS. LYMPHOCYTES 1.9 0.5 - 4.6 K/UL    ABS. MONOCYTES 0.7 0.1 - 1.3 K/UL    ABS. EOSINOPHILS 0.2 0.0 - 0.8 K/UL    ABS. BASOPHILS 0.1 0.0 - 0.2 K/UL    ABS. IMM.  GRANS. 0.1 0.0 - 0.5 K/UL    RBC COMMENTS NORMOCYTIC/NORMOCHROMIC      WBC COMMENTS Result Confirmed By Smear      PLATELET COMMENTS DECREASED      DF AUTOMATED     METABOLIC PANEL, COMPREHENSIVE    Collection Time: 01/08/21  4:55 AM   Result Value Ref Range    Sodium 137 136 - 145 mmol/L    Potassium 4.1 3.5 - 5.1 mmol/L    Chloride 104 98 - 107 mmol/L    CO2 26 21 - 32 mmol/L    Anion gap 7 7 - 16 mmol/L    Glucose 132 (H) 65 - 100 mg/dL    BUN 24 (H) 6 - 23 MG/DL    Creatinine 6.26 (H) 0.6 - 1.0 MG/DL    GFR est AA 10 (L) >60 ml/min/1.73m2    GFR est non-AA 8 (L) >60 ml/min/1.73m2    Calcium 9.1 8.3 - 10.4 MG/DL    Bilirubin, total 0.3 0.2 - 1.1 MG/DL    ALT (SGPT) 12 12 - 65 U/L    AST (SGOT) 14 (L) 15 - 37 U/L    Alk.  phosphatase 104 50 - 136 U/L    Protein, total 7.2 6.3 - 8.2 g/dL    Albumin 2.1 (L) 3.5 - 5.0 g/dL    Globulin 5.1 (H) 2.3 - 3.5 g/dL    A-G Ratio 0.4 (L) 1.2 - 3.5     PHOSPHORUS    Collection Time: 01/08/21  4:55 AM   Result Value Ref Range    Phosphorus 4.2 2.5 - 4.5 MG/DL   GLUCOSE, POC    Collection Time: 01/08/21  7:21 AM   Result Value Ref Range    Glucose (POC) 144 (H) 65 - 100 mg/dL   GLUCOSE, POC    Collection Time: 01/08/21 11:00 AM   Result Value Ref Range    Glucose (POC) 137 (H) 65 - 100 mg/dL   GLUCOSE, POC    Collection Time: 01/08/21  4:04 PM   Result Value Ref Range    Glucose (POC) 131 (H) 65 - 100 mg/dL        Current Meds:  Current Facility-Administered Medications   Medication Dose Route Frequency    labetaloL (NORMODYNE) tablet 400 mg  400 mg Oral TID    amLODIPine (NORVASC) tablet 5 mg  5 mg Oral BID    hydrALAZINE (APRESOLINE) tablet 50 mg  50 mg Oral Q6H PRN    senna-docusate (PERICOLACE) 8.6-50 mg per tablet 1 Tab  1 Tab Oral DAILY PRN    heparin (porcine) 1,000 unit/mL injection 5,000 Units  5,000 Units Hemodialysis DIALYSIS PRN    midazolam (VERSED) injection 0.5-2 mg  0.5-2 mg IntraVENous Multiple    epoetin marianne-epbx (RETACRIT) injection 10,000 Units  10,000 Units SubCUTAneous Q7D    midazolam (VERSED) injection 0.25-2 mg  0.25-2 mg IntraVENous Multiple    diphenhydrAMINE (BENADRYL) injection 50 mg  50 mg IntraVENous Multiple    HYDROcodone-acetaminophen (NORCO)  mg tablet 1 Tab  1 Tab Oral Q6H PRN    labetaloL (NORMODYNE;TRANDATE) injection 20 mg  20 mg IntraVENous Q1H PRN    cloNIDine HCL (CATAPRES) tablet 0.2 mg  0.2 mg Oral BID    sevelamer carbonate (RENVELA) tab 800 mg  800 mg Oral TID WITH MEALS    famotidine (PEPCID) tablet 20 mg  20 mg Oral DAILY    insulin lispro (HUMALOG) injection 0-10 Units  0-10 Units SubCUTAneous AC&HS    lip protectant (BLISTEX) ointment 1 Each  1 Each Topical PRN    alum-mag hydroxide-simeth (MYLANTA) oral suspension 30 mL  30 mL Oral Q4H PRN    heparin (porcine) injection 5,000 Units  5,000 Units SubCUTAneous Q8H    sodium chloride (NS) flush 5-40 mL  5-40 mL IntraVENous Q8H    sodium chloride (NS) flush 5-40 mL  5-40 mL IntraVENous PRN    acetaminophen (TYLENOL) tablet 650 mg  650 mg Oral Q6H PRN    Or    acetaminophen (TYLENOL) suppository 650 mg  650 mg Rectal Q6H PRN    polyethylene glycol (MIRALAX) packet 17 g  17 g Oral DAILY PRN    promethazine (PHENERGAN) tablet 12.5 mg  12.5 mg Oral Q6H PRN       Other Studies:  No results found for this visit on 12/23/20. No results found. All Micro Results     Procedure Component Value Units Date/Time    CULTURE, URINE [[id number]] Collected: 12/24/20 0016    Order Status: Completed Specimen: Cath Urine Updated: 12/26/20 0719     Special Requests: NO SPECIAL REQUESTS        Culture result:       >100,000 COLONIES/mL MIXED SKIN AZEEM ISOLATED                  THREE OR MORE TYPES OF ORGANISMS ARE PRESENT. THIS IS INDICATIVE OF CONTAMINATION DUE TO IMPROPER COLLECTION TECHNIQUE. PLEASE REPEAT COLLECTION UNLESS PATIENT HAS STARTED ANTIBIOTIC TREATMENT.                 SARS-CoV-2 Lab Results  \"Novel Coronavirus\" Test: No results found for: COV2NT   \"Emergent Disease\" Test: No results found for: EDPR  \"SARS-COV-2\" Test: No results found for: XGCOVT  Rapid Test:   Lab Results   Component Value Date/Time    COVR Not detected 12/24/2020 12:56 AM            Assessment and Plan:     Hospital Problems as of 1/8/2021 Never Reviewed          Codes Class Noted - Resolved POA    * (Principal) Acute renal failure on dialysis [facility]) ICD-10-CM: N17.9, Z99.2  ICD-9-CM: 584.9, V45.11  12/31/2020 - Present Yes        Acute renal failure (ARF) ([facility] [address] ICD-10-CM: N17.9  ICD-9-CM: 584.9  12/24/2020 - Present Unknown        Elevated procalcitonin ICD-10-CM: R79.89  ICD-9-CM: 790.99  12/24/2020 - Present Unknown        Obesity ICD-10-CM: E66.9  ICD-9-CM: 278.00  12/24/2020 - Present Unknown        Hx of essential hypertension ICD-10-CM: Z86.79  ICD-9-CM: V12.59  12/24/2020 - Present Unknown        Microcytic anemia ICD-10-CM: D50.9  ICD-9-CM: 280.9  12/24/2020 - Present Unknown              Plan:      · New onset JOSSELIN progressed to CKD, high risk for ESRD-  on new hemodialysis:  · can discharge once has HD slot  · TCC in place   · Completed course of empiric rocephin 7 days      · HTN:  · Continued norvasc, clonidine, labetalol. Increase labetalol to 400mg tid      · DM2:  · On SSI  · DM2 education  · Minimal insulin use since admission. A1c is likely falsely elevated secondary to ESRD  · Needs Rx for glucometer/strips  · Check fructosamine      DC planning/Dispo:   To home once she has outpatient hemodialysis slot     Diet:  DIET RENAL  DVT ppx:  heparin    Signed:  Cliff Vieyra MD

## 2021-01-08 NOTE — PROGRESS NOTES
Problem: Falls - Risk of  Goal: *Absence of Falls  Description: Document Matias Lopez Fall Risk and appropriate interventions in the flowsheet. Outcome: Progressing Towards Goal  Note: Fall Risk Interventions:            Medication Interventions: Patient to call before getting OOB, Teach patient to arise slowly    Elimination Interventions: Call light in reach              Problem: Patient Education: Go to Patient Education Activity  Goal: Patient/Family Education  Outcome: Progressing Towards Goal     Problem: Diabetes Self-Management  Goal: *Disease process and treatment process  Description: Define diabetes and identify own type of diabetes; list 3 options for treating diabetes. Outcome: Progressing Towards Goal  Goal: *Incorporating nutritional management into lifestyle  Description: Describe effect of type, amount and timing of food on blood glucose; list 3 methods for planning meals. Outcome: Progressing Towards Goal  Goal: *Incorporating physical activity into lifestyle  Description: State effect of exercise on blood glucose levels. Outcome: Progressing Towards Goal  Goal: *Developing strategies to promote health/change behavior  Description: Define the ABC's of diabetes; identify appropriate screenings, schedule and personal plan for screenings. Outcome: Progressing Towards Goal  Goal: *Using medications safely  Description: State effect of diabetes medications on diabetes; name diabetes medication taking, action and side effects. Outcome: Progressing Towards Goal  Goal: *Monitoring blood glucose, interpreting and using results  Description: Identify recommended blood glucose targets  and personal targets. Outcome: Progressing Towards Goal  Goal: *Prevention, detection, treatment of acute complications  Description: List symptoms of hyper- and hypoglycemia; describe how to treat low blood sugar and actions for lowering  high blood glucose level.   Outcome: Progressing Towards Goal  Goal: *Prevention, detection and treatment of chronic complications  Description: Define the natural course of diabetes and describe the relationship of blood glucose levels to long term complications of diabetes. Outcome: Progressing Towards Goal  Goal: *Developing strategies to address psychosocial issues  Description: Describe feelings about living with diabetes; identify support needed and support network  Outcome: Progressing Towards Goal  Goal: *Insulin pump training  Outcome: Progressing Towards Goal  Goal: *Sick day guidelines  Outcome: Progressing Towards Goal  Goal: *Patient Specific Goal (EDIT GOAL, INSERT TEXT)  Outcome: Progressing Towards Goal     Problem: Patient Education: Go to Patient Education Activity  Goal: Patient/Family Education  Outcome: Progressing Towards Goal     Problem: Acute Renal Failure: Discharge Outcomes  Goal: *Optimal pain control at patient's stated goal  Outcome: Progressing Towards Goal  Goal: *Urinary output within identified parameters  Outcome: Progressing Towards Goal  Goal: *Hemodynamically stable  Outcome: Progressing Towards Goal  Goal: *Tolerating diet  Outcome: Progressing Towards Goal  Goal: *Lab values stabilized  Outcome: Progressing Towards Goal  Goal: *Verbalizes understanding and describes medication purposes and frequencies  Outcome: Progressing Towards Goal  Goal: *Medication reconciliation  Outcome: Progressing Towards Goal     Problem: Pressure Injury - Risk of  Goal: *Prevention of pressure injury  Description: Document Oneal Scale and appropriate interventions in the flowsheet.   Outcome: Progressing Towards Goal     Problem: Patient Education: Go to Patient Education Activity  Goal: Patient/Family Education  Outcome: Progressing Towards Goal

## 2021-01-09 LAB
ALBUMIN SERPL-MCNC: 2.2 G/DL (ref 3.5–5)
ALBUMIN/GLOB SERPL: 0.5 {RATIO} (ref 1.2–3.5)
ALP SERPL-CCNC: 119 U/L (ref 50–136)
ALT SERPL-CCNC: 13 U/L (ref 12–65)
ANION GAP SERPL CALC-SCNC: 7 MMOL/L (ref 7–16)
AST SERPL-CCNC: 14 U/L (ref 15–37)
BASOPHILS # BLD: 0.1 K/UL (ref 0–0.2)
BASOPHILS NFR BLD: 2 % (ref 0–2)
BILIRUB SERPL-MCNC: 0.4 MG/DL (ref 0.2–1.1)
BUN SERPL-MCNC: 26 MG/DL (ref 6–23)
CALCIUM SERPL-MCNC: 9 MG/DL (ref 8.3–10.4)
CHLORIDE SERPL-SCNC: 106 MMOL/L (ref 98–107)
CO2 SERPL-SCNC: 26 MMOL/L (ref 21–32)
CREAT SERPL-MCNC: 7.19 MG/DL (ref 0.6–1)
DIFFERENTIAL METHOD BLD: ABNORMAL
EOSINOPHIL # BLD: 0.2 K/UL (ref 0–0.8)
EOSINOPHIL NFR BLD: 3 % (ref 0.5–7.8)
ERYTHROCYTE [DISTWIDTH] IN BLOOD BY AUTOMATED COUNT: 13.7 % (ref 11.9–14.6)
GLOBULIN SER CALC-MCNC: 4.5 G/DL (ref 2.3–3.5)
GLUCOSE BLD STRIP.AUTO-MCNC: 114 MG/DL (ref 65–100)
GLUCOSE BLD STRIP.AUTO-MCNC: 115 MG/DL (ref 65–100)
GLUCOSE BLD STRIP.AUTO-MCNC: 135 MG/DL (ref 65–100)
GLUCOSE BLD STRIP.AUTO-MCNC: 162 MG/DL (ref 65–100)
GLUCOSE SERPL-MCNC: 126 MG/DL (ref 65–100)
HCT VFR BLD AUTO: 30.9 % (ref 35.8–46.3)
HGB BLD-MCNC: 9.4 G/DL (ref 11.7–15.4)
IMM GRANULOCYTES # BLD AUTO: 0 K/UL (ref 0–0.5)
IMM GRANULOCYTES NFR BLD AUTO: 0 % (ref 0–5)
LYMPHOCYTES # BLD: 1.8 K/UL (ref 0.5–4.6)
LYMPHOCYTES NFR BLD: 30 % (ref 13–44)
MCH RBC QN AUTO: 25.3 PG (ref 26.1–32.9)
MCHC RBC AUTO-ENTMCNC: 30.4 G/DL (ref 31.4–35)
MCV RBC AUTO: 83.1 FL (ref 79.6–97.8)
MONOCYTES # BLD: 0.7 K/UL (ref 0.1–1.3)
MONOCYTES NFR BLD: 11 % (ref 4–12)
NEUTS SEG # BLD: 3.3 K/UL (ref 1.7–8.2)
NEUTS SEG NFR BLD: 54 % (ref 43–78)
NRBC # BLD: 0 K/UL (ref 0–0.2)
PHOSPHATE SERPL-MCNC: 5 MG/DL (ref 2.5–4.5)
PLATELET # BLD AUTO: 115 K/UL (ref 150–450)
PMV BLD AUTO: 10 FL (ref 9.4–12.3)
POTASSIUM SERPL-SCNC: 4.2 MMOL/L (ref 3.5–5.1)
PROT SERPL-MCNC: 6.7 G/DL (ref 6.3–8.2)
RBC # BLD AUTO: 3.72 M/UL (ref 4.05–5.2)
SODIUM SERPL-SCNC: 139 MMOL/L (ref 136–145)
WBC # BLD AUTO: 6 K/UL (ref 4.3–11.1)

## 2021-01-09 PROCEDURE — 82985 ASSAY OF GLYCATED PROTEIN: CPT

## 2021-01-09 PROCEDURE — 36415 COLL VENOUS BLD VENIPUNCTURE: CPT

## 2021-01-09 PROCEDURE — 65660000000 HC RM CCU STEPDOWN

## 2021-01-09 PROCEDURE — 74011250637 HC RX REV CODE- 250/637: Performed by: INTERNAL MEDICINE

## 2021-01-09 PROCEDURE — 80053 COMPREHEN METABOLIC PANEL: CPT

## 2021-01-09 PROCEDURE — 74011250637 HC RX REV CODE- 250/637: Performed by: NURSE PRACTITIONER

## 2021-01-09 PROCEDURE — 85025 COMPLETE CBC W/AUTO DIFF WBC: CPT

## 2021-01-09 PROCEDURE — 74011250636 HC RX REV CODE- 250/636: Performed by: STUDENT IN AN ORGANIZED HEALTH CARE EDUCATION/TRAINING PROGRAM

## 2021-01-09 PROCEDURE — 74011250636 HC RX REV CODE- 250/636: Performed by: INTERNAL MEDICINE

## 2021-01-09 PROCEDURE — 84100 ASSAY OF PHOSPHORUS: CPT

## 2021-01-09 PROCEDURE — 74011250637 HC RX REV CODE- 250/637: Performed by: STUDENT IN AN ORGANIZED HEALTH CARE EDUCATION/TRAINING PROGRAM

## 2021-01-09 PROCEDURE — 82962 GLUCOSE BLOOD TEST: CPT

## 2021-01-09 PROCEDURE — 90935 HEMODIALYSIS ONE EVALUATION: CPT

## 2021-01-09 RX ADMIN — CLONIDINE HYDROCHLORIDE 0.2 MG: 0.1 TABLET ORAL at 21:40

## 2021-01-09 RX ADMIN — HEPARIN SODIUM 5000 UNITS: 5000 INJECTION INTRAVENOUS; SUBCUTANEOUS at 12:18

## 2021-01-09 RX ADMIN — SEVELAMER CARBONATE 800 MG: 800 TABLET, FILM COATED ORAL at 12:20

## 2021-01-09 RX ADMIN — HEPARIN SODIUM 5000 UNITS: 5000 INJECTION INTRAVENOUS; SUBCUTANEOUS at 21:40

## 2021-01-09 RX ADMIN — HEPARIN SODIUM 5000 UNITS: 5000 INJECTION INTRAVENOUS; SUBCUTANEOUS at 05:45

## 2021-01-09 RX ADMIN — Medication 5 ML: at 14:32

## 2021-01-09 RX ADMIN — Medication 5 ML: at 21:48

## 2021-01-09 RX ADMIN — SEVELAMER CARBONATE 800 MG: 800 TABLET, FILM COATED ORAL at 16:43

## 2021-01-09 RX ADMIN — AMLODIPINE BESYLATE 5 MG: 5 TABLET ORAL at 17:49

## 2021-01-09 RX ADMIN — ACETAMINOPHEN 650 MG: 325 TABLET, FILM COATED ORAL at 12:18

## 2021-01-09 RX ADMIN — Medication 10 ML: at 05:45

## 2021-01-09 RX ADMIN — HEPARIN SODIUM 5000 UNITS: 1000 INJECTION INTRAVENOUS; SUBCUTANEOUS at 07:46

## 2021-01-09 RX ADMIN — LABETALOL HYDROCHLORIDE 400 MG: 200 TABLET, FILM COATED ORAL at 16:43

## 2021-01-09 RX ADMIN — LABETALOL HYDROCHLORIDE 400 MG: 200 TABLET, FILM COATED ORAL at 21:40

## 2021-01-09 NOTE — PROGRESS NOTES
[facility]    Follow-Up on: JOSSELIN on CKD more likely ESRD    HPI: Pt seen and examined, on HD tolerating it well no new complaints.      ROS:  General: no fever/chills, appetite ok  CV: no CP  Lung: no SOB, no cough  GI: no N/V/D  Ext: no edema     Current Facility-Administered Medications   Medication Dose Route Frequency    labetaloL (NORMODYNE) tablet 400 mg  400 mg Oral TID    amLODIPine (NORVASC) tablet 5 mg  5 mg Oral BID    hydrALAZINE (APRESOLINE) tablet 50 mg  50 mg Oral Q6H PRN    senna-docusate (PERICOLACE) 8.6-50 mg per tablet 1 Tab  1 Tab Oral DAILY PRN    heparin (porcine) 1,000 unit/mL injection 5,000 Units  5,000 Units Hemodialysis DIALYSIS PRN    midazolam (VERSED) injection 0.5-2 mg  0.5-2 mg IntraVENous Multiple    epoetin marianne-epbx (RETACRIT) injection 10,000 Units  10,000 Units SubCUTAneous Q7D    midazolam (VERSED) injection 0.25-2 mg  0.25-2 mg IntraVENous Multiple    diphenhydrAMINE (BENADRYL) injection 50 mg  50 mg IntraVENous Multiple    HYDROcodone-acetaminophen (NORCO)  mg tablet 1 Tab  1 Tab Oral Q6H PRN    labetaloL (NORMODYNE;TRANDATE) injection 20 mg  20 mg IntraVENous Q1H PRN    cloNIDine HCL (CATAPRES) tablet 0.2 mg  0.2 mg Oral BID    sevelamer carbonate (RENVELA) tab 800 mg  800 mg Oral TID WITH MEALS    famotidine (PEPCID) tablet 20 mg  20 mg Oral DAILY    insulin lispro (HUMALOG) injection 0-10 Units  0-10 Units SubCUTAneous AC&HS    lip protectant (BLISTEX) ointment 1 Each  1 Each Topical PRN    alum-mag hydroxide-simeth (MYLANTA) oral suspension 30 mL  30 mL Oral Q4H PRN    heparin (porcine) injection 5,000 Units  5,000 Units SubCUTAneous Q8H    sodium chloride (NS) flush 5-40 mL  5-40 mL IntraVENous Q8H    sodium chloride (NS) flush 5-40 mL  5-40 mL IntraVENous PRN    acetaminophen (TYLENOL) tablet 650 mg  650 mg Oral Q6H PRN    Or    acetaminophen (TYLENOL) suppository 650 mg  650 mg Rectal Q6H PRN    polyethylene glycol (MIRALAX) packet 17 g  17 g Oral DAILY PRN    promethazine (PHENERGAN) tablet 12.5 mg  12.5 mg Oral Q6H PRN       Exam:  Vitals:    01/09/21 0901 01/09/21 1033 01/09/21 1059 01/09/21 1121   BP: (!) 168/96 (!) 145/90 (!) 157/94 (!) 143/91   Pulse: 78 89 89 94   Resp:       Temp:       SpO2:       Weight:       Height:             Intake/Output Summary (Last 24 hours) at 1/9/2021 1246  Last data filed at 1/9/2021 1121  Gross per 24 hour   Intake --   Output 1500 ml   Net -1500 ml     PE:  GEN - in no distress, alert and oriented  CV - regular rate. S1, S2, no murmur, no rub  Lung - clear bilaterally  Abd - soft, nontender  Ext - no edema  Access: right TCC- intact     Labs  Recent Labs     01/09/21  0551 01/08/21  0455 01/07/21  0605   WBC 6.0 6.7 6.1   HGB 9.4* 8.8* 8.9*   HCT 30.9* 28.9* 28.5*   * 111* 140*     Recent Labs     01/09/21  0551 01/08/21  0455 01/07/21  0605    137 136   K 4.2 4.1 3.9    104 102   CO2 26 26 28   BUN 26* 24* 34*   CREA 7.19* 6.26* 7.96*   * 132* 125*   CA 9.0 9.1 9.3   PHOS 5.0* 4.2 5.7*     No results for input(s): PH, PCO2, PO2, PCO2 in the last 72 hours. Problem List:  Patient Active Problem List    Diagnosis Date Noted    Acute renal failure on dialysis [facility]) 12/31/2020    Acute renal failure (ARF) ([address] 12/24/2020    Elevated procalcitonin 12/24/2020    Obesity 12/24/2020    Hx of essential hypertension 12/24/2020    Microcytic anemia 12/24/2020       Issues Addressed By Nephrology:    Plan:  1. JOSSELIN /ESRD-seen on HD   Pt continues to get education about dialysis and kidney failure and what to expect. recommend she read up on dialysis and transplant at [email or web address].   2. S/p Renal bx c/w collapsing glomerulopathy FSGS; would like to try some high dose prednisone once pt agrees to the side-effects and risk benefit ratio  3. First dialysis 12/24  4. Marizol Medina 85 placed 12/30/21  5. Disposition awaiting outpt slot under JOSSELIN  6.  Anemia on alie   HTN start BP meds

## 2021-01-09 NOTE — PROGRESS NOTES
Problem: Falls - Risk of  Goal: *Absence of Falls  Description: Document Ana Lilia Kramer Fall Risk and appropriate interventions in the flowsheet. Outcome: Progressing Towards Goal  Note: Fall Risk Interventions:            Medication Interventions: Evaluate medications/consider consulting pharmacy, Teach patient to arise slowly    Elimination Interventions: Call light in reach              Problem: Patient Education: Go to Patient Education Activity  Goal: Patient/Family Education  Outcome: Progressing Towards Goal     Problem: Diabetes Self-Management  Goal: *Disease process and treatment process  Description: Define diabetes and identify own type of diabetes; list 3 options for treating diabetes. Outcome: Progressing Towards Goal  Goal: *Incorporating nutritional management into lifestyle  Description: Describe effect of type, amount and timing of food on blood glucose; list 3 methods for planning meals. Outcome: Progressing Towards Goal  Goal: *Incorporating physical activity into lifestyle  Description: State effect of exercise on blood glucose levels. Outcome: Progressing Towards Goal  Goal: *Developing strategies to promote health/change behavior  Description: Define the ABC's of diabetes; identify appropriate screenings, schedule and personal plan for screenings. Outcome: Progressing Towards Goal  Goal: *Using medications safely  Description: State effect of diabetes medications on diabetes; name diabetes medication taking, action and side effects. Outcome: Progressing Towards Goal  Goal: *Monitoring blood glucose, interpreting and using results  Description: Identify recommended blood glucose targets  and personal targets. Outcome: Progressing Towards Goal  Goal: *Prevention, detection, treatment of acute complications  Description: List symptoms of hyper- and hypoglycemia; describe how to treat low blood sugar and actions for lowering  high blood glucose level.   Outcome: Progressing Towards Goal  Goal: *Prevention, detection and treatment of chronic complications  Description: Define the natural course of diabetes and describe the relationship of blood glucose levels to long term complications of diabetes. Outcome: Progressing Towards Goal  Goal: *Developing strategies to address psychosocial issues  Description: Describe feelings about living with diabetes; identify support needed and support network  Outcome: Progressing Towards Goal     Problem: Patient Education: Go to Patient Education Activity  Goal: Patient/Family Education  Outcome: Progressing Towards Goal     Problem: Acute Renal Failure: Discharge Outcomes  Goal: *Optimal pain control at patient's stated goal  Outcome: Progressing Towards Goal  Goal: *Urinary output within identified parameters  Outcome: Progressing Towards Goal  Goal: *Hemodynamically stable  Outcome: Progressing Towards Goal  Goal: *Tolerating diet  Outcome: Progressing Towards Goal  Goal: *Lab values stabilized  Outcome: Progressing Towards Goal  Goal: *Verbalizes understanding and describes medication purposes and frequencies  Outcome: Progressing Towards Goal  Goal: *Medication reconciliation  Outcome: Progressing Towards Goal     Problem: Pressure Injury - Risk of  Goal: *Prevention of pressure injury  Description: Document Oneal Scale and appropriate interventions in the flowsheet.   Outcome: Progressing Towards Goal  Note: Pressure Injury Interventions:  Sensory Interventions: Assess changes in LOC, Avoid rigorous massage over bony prominences    Moisture Interventions: Absorbent underpads, Check for incontinence Q2 hours and as needed    Activity Interventions: Increase time out of bed, Pressure redistribution bed/mattress(bed type)    Mobility Interventions: Pressure redistribution bed/mattress (bed type)    Nutrition Interventions: Document food/fluid/supplement intake    Friction and Shear Interventions: HOB 30 degrees or less                Problem: Patient Education: Go to Patient Education Activity  Goal: Patient/Family Education  Outcome: Progressing Towards Goal

## 2021-01-09 NOTE — PROGRESS NOTES
TRANSFER IN - DIALYSIS    Received patient in dialysis unit  from [facility] (unit) for ordered procedure. Consent verified for renal replacement therapy. Patient alert and vital signs stable. /95 P77    Hemodialysis initiated using Right PC. Aspirated and flushed both ports without difficulty. Dressing clean, dry and intact. Machine settings per MD order. Heparin 2000 unit bolus and 1000 units/hr. Will monitor during treatment.

## 2021-01-10 LAB
ALBUMIN SERPL-MCNC: 2.1 G/DL (ref 3.5–5)
ALBUMIN/GLOB SERPL: 0.4 {RATIO} (ref 1.2–3.5)
ALP SERPL-CCNC: 114 U/L (ref 50–136)
ALT SERPL-CCNC: 13 U/L (ref 12–65)
ANION GAP SERPL CALC-SCNC: 6 MMOL/L (ref 7–16)
AST SERPL-CCNC: 10 U/L (ref 15–37)
BASOPHILS # BLD: 0.1 K/UL (ref 0–0.2)
BASOPHILS NFR BLD: 1 % (ref 0–2)
BILIRUB SERPL-MCNC: 0.3 MG/DL (ref 0.2–1.1)
BUN SERPL-MCNC: 20 MG/DL (ref 6–23)
CALCIUM SERPL-MCNC: 9 MG/DL (ref 8.3–10.4)
CHLORIDE SERPL-SCNC: 103 MMOL/L (ref 98–107)
CO2 SERPL-SCNC: 28 MMOL/L (ref 21–32)
CREAT SERPL-MCNC: 5.28 MG/DL (ref 0.6–1)
DIFFERENTIAL METHOD BLD: ABNORMAL
EOSINOPHIL # BLD: 0.3 K/UL (ref 0–0.8)
EOSINOPHIL NFR BLD: 5 % (ref 0.5–7.8)
ERYTHROCYTE [DISTWIDTH] IN BLOOD BY AUTOMATED COUNT: 13.8 % (ref 11.9–14.6)
FRUCTOSAMINE SERPL-SCNC: 186 UMOL/L (ref 0–285)
GLOBULIN SER CALC-MCNC: 5.2 G/DL (ref 2.3–3.5)
GLUCOSE BLD STRIP.AUTO-MCNC: 119 MG/DL (ref 65–100)
GLUCOSE BLD STRIP.AUTO-MCNC: 143 MG/DL (ref 65–100)
GLUCOSE BLD STRIP.AUTO-MCNC: 149 MG/DL (ref 65–100)
GLUCOSE BLD STRIP.AUTO-MCNC: 196 MG/DL (ref 65–100)
GLUCOSE SERPL-MCNC: 121 MG/DL (ref 65–100)
HCT VFR BLD AUTO: 31.6 % (ref 35.8–46.3)
HGB BLD-MCNC: 9.7 G/DL (ref 11.7–15.4)
IMM GRANULOCYTES # BLD AUTO: 0 K/UL (ref 0–0.5)
IMM GRANULOCYTES NFR BLD AUTO: 1 % (ref 0–5)
LYMPHOCYTES # BLD: 2.1 K/UL (ref 0.5–4.6)
LYMPHOCYTES NFR BLD: 33 % (ref 13–44)
MCH RBC QN AUTO: 25.3 PG (ref 26.1–32.9)
MCHC RBC AUTO-ENTMCNC: 30.7 G/DL (ref 31.4–35)
MCV RBC AUTO: 82.5 FL (ref 79.6–97.8)
MONOCYTES # BLD: 0.7 K/UL (ref 0.1–1.3)
MONOCYTES NFR BLD: 12 % (ref 4–12)
NEUTS SEG # BLD: 3.1 K/UL (ref 1.7–8.2)
NEUTS SEG NFR BLD: 49 % (ref 43–78)
NRBC # BLD: 0 K/UL (ref 0–0.2)
PHOSPHATE SERPL-MCNC: 3.5 MG/DL (ref 2.5–4.5)
PLATELET # BLD AUTO: 60 K/UL (ref 150–450)
PMV BLD AUTO: 10.6 FL (ref 9.4–12.3)
POTASSIUM SERPL-SCNC: 3.9 MMOL/L (ref 3.5–5.1)
PROT SERPL-MCNC: 7.3 G/DL (ref 6.3–8.2)
RBC # BLD AUTO: 3.83 M/UL (ref 4.05–5.2)
SODIUM SERPL-SCNC: 137 MMOL/L (ref 136–145)
WBC # BLD AUTO: 6.3 K/UL (ref 4.3–11.1)

## 2021-01-10 PROCEDURE — 74011250637 HC RX REV CODE- 250/637: Performed by: NURSE PRACTITIONER

## 2021-01-10 PROCEDURE — 65660000000 HC RM CCU STEPDOWN

## 2021-01-10 PROCEDURE — 74011250637 HC RX REV CODE- 250/637: Performed by: INTERNAL MEDICINE

## 2021-01-10 PROCEDURE — 74011250637 HC RX REV CODE- 250/637: Performed by: STUDENT IN AN ORGANIZED HEALTH CARE EDUCATION/TRAINING PROGRAM

## 2021-01-10 PROCEDURE — 80053 COMPREHEN METABOLIC PANEL: CPT

## 2021-01-10 PROCEDURE — 74011250636 HC RX REV CODE- 250/636: Performed by: STUDENT IN AN ORGANIZED HEALTH CARE EDUCATION/TRAINING PROGRAM

## 2021-01-10 PROCEDURE — 82962 GLUCOSE BLOOD TEST: CPT

## 2021-01-10 PROCEDURE — 85025 COMPLETE CBC W/AUTO DIFF WBC: CPT

## 2021-01-10 PROCEDURE — 84100 ASSAY OF PHOSPHORUS: CPT

## 2021-01-10 PROCEDURE — 74011636637 HC RX REV CODE- 636/637: Performed by: INTERNAL MEDICINE

## 2021-01-10 PROCEDURE — 36415 COLL VENOUS BLD VENIPUNCTURE: CPT

## 2021-01-10 PROCEDURE — 74011636637 HC RX REV CODE- 636/637: Performed by: STUDENT IN AN ORGANIZED HEALTH CARE EDUCATION/TRAINING PROGRAM

## 2021-01-10 RX ORDER — PREDNISONE 20 MG/1
60 TABLET ORAL
Status: DISCONTINUED | OUTPATIENT
Start: 2021-01-10 | End: 2021-01-11 | Stop reason: HOSPADM

## 2021-01-10 RX ADMIN — LABETALOL HYDROCHLORIDE 400 MG: 200 TABLET, FILM COATED ORAL at 17:15

## 2021-01-10 RX ADMIN — PREDNISONE 60 MG: 20 TABLET ORAL at 17:15

## 2021-01-10 RX ADMIN — HEPARIN SODIUM 5000 UNITS: 5000 INJECTION INTRAVENOUS; SUBCUTANEOUS at 13:23

## 2021-01-10 RX ADMIN — Medication 10 ML: at 13:24

## 2021-01-10 RX ADMIN — INSULIN LISPRO 2 UNITS: 100 INJECTION, SOLUTION INTRAVENOUS; SUBCUTANEOUS at 17:16

## 2021-01-10 RX ADMIN — LABETALOL HYDROCHLORIDE 400 MG: 200 TABLET, FILM COATED ORAL at 21:21

## 2021-01-10 RX ADMIN — Medication 5 ML: at 21:22

## 2021-01-10 RX ADMIN — LABETALOL HYDROCHLORIDE 400 MG: 200 TABLET, FILM COATED ORAL at 08:20

## 2021-01-10 RX ADMIN — Medication 5 ML: at 06:37

## 2021-01-10 RX ADMIN — SEVELAMER CARBONATE 800 MG: 800 TABLET, FILM COATED ORAL at 11:34

## 2021-01-10 RX ADMIN — FAMOTIDINE 20 MG: 20 TABLET, FILM COATED ORAL at 08:21

## 2021-01-10 RX ADMIN — HEPARIN SODIUM 5000 UNITS: 5000 INJECTION INTRAVENOUS; SUBCUTANEOUS at 06:35

## 2021-01-10 RX ADMIN — CLONIDINE HYDROCHLORIDE 0.2 MG: 0.1 TABLET ORAL at 21:21

## 2021-01-10 RX ADMIN — AMLODIPINE BESYLATE 5 MG: 5 TABLET ORAL at 08:20

## 2021-01-10 RX ADMIN — SEVELAMER CARBONATE 800 MG: 800 TABLET, FILM COATED ORAL at 08:20

## 2021-01-10 RX ADMIN — HEPARIN SODIUM 5000 UNITS: 5000 INJECTION INTRAVENOUS; SUBCUTANEOUS at 21:22

## 2021-01-10 RX ADMIN — SEVELAMER CARBONATE 800 MG: 800 TABLET, FILM COATED ORAL at 17:15

## 2021-01-10 RX ADMIN — CLONIDINE HYDROCHLORIDE 0.2 MG: 0.1 TABLET ORAL at 08:21

## 2021-01-10 RX ADMIN — AMLODIPINE BESYLATE 5 MG: 5 TABLET ORAL at 17:15

## 2021-01-10 NOTE — PROGRESS NOTES
Problem: Falls - Risk of  Goal: *Absence of Falls  Description: Document Geena Cazares Fall Risk and appropriate interventions in the flowsheet. Outcome: Progressing Towards Goal  Note: Fall Risk Interventions:            Medication Interventions: Bed/chair exit alarm    Elimination Interventions: Call light in reach, Bed/chair exit alarm              Problem: Patient Education: Go to Patient Education Activity  Goal: Patient/Family Education  Outcome: Progressing Towards Goal     Problem: Diabetes Self-Management  Goal: *Disease process and treatment process  Description: Define diabetes and identify own type of diabetes; list 3 options for treating diabetes. Outcome: Progressing Towards Goal  Goal: *Incorporating nutritional management into lifestyle  Description: Describe effect of type, amount and timing of food on blood glucose; list 3 methods for planning meals. Outcome: Progressing Towards Goal  Goal: *Incorporating physical activity into lifestyle  Description: State effect of exercise on blood glucose levels. Outcome: Progressing Towards Goal  Goal: *Developing strategies to promote health/change behavior  Description: Define the ABC's of diabetes; identify appropriate screenings, schedule and personal plan for screenings. Outcome: Progressing Towards Goal  Goal: *Using medications safely  Description: State effect of diabetes medications on diabetes; name diabetes medication taking, action and side effects. Outcome: Progressing Towards Goal  Goal: *Monitoring blood glucose, interpreting and using results  Description: Identify recommended blood glucose targets  and personal targets. Outcome: Progressing Towards Goal  Goal: *Prevention, detection, treatment of acute complications  Description: List symptoms of hyper- and hypoglycemia; describe how to treat low blood sugar and actions for lowering  high blood glucose level.   Outcome: Progressing Towards Goal  Goal: *Prevention, detection and treatment of chronic complications  Description: Define the natural course of diabetes and describe the relationship of blood glucose levels to long term complications of diabetes. Outcome: Progressing Towards Goal  Goal: *Developing strategies to address psychosocial issues  Description: Describe feelings about living with diabetes; identify support needed and support network  Outcome: Progressing Towards Goal  Goal: *Insulin pump training  Outcome: Progressing Towards Goal  Goal: *Sick day guidelines  Outcome: Progressing Towards Goal  Goal: *Patient Specific Goal (EDIT GOAL, INSERT TEXT)  Outcome: Progressing Towards Goal     Problem: Patient Education: Go to Patient Education Activity  Goal: Patient/Family Education  Outcome: Progressing Towards Goal     Problem: Acute Renal Failure: Discharge Outcomes  Goal: *Optimal pain control at patient's stated goal  Outcome: Progressing Towards Goal  Goal: *Urinary output within identified parameters  Outcome: Progressing Towards Goal  Goal: *Hemodynamically stable  Outcome: Progressing Towards Goal  Goal: *Tolerating diet  Outcome: Progressing Towards Goal  Goal: *Lab values stabilized  Outcome: Progressing Towards Goal  Goal: *Verbalizes understanding and describes medication purposes and frequencies  Outcome: Progressing Towards Goal  Goal: *Medication reconciliation  Outcome: Progressing Towards Goal     Problem: Pressure Injury - Risk of  Goal: *Prevention of pressure injury  Description: Document Oneal Scale and appropriate interventions in the flowsheet.   Outcome: Progressing Towards Goal  Note: Pressure Injury Interventions:  Sensory Interventions: Assess changes in LOC, Avoid rigorous massage over bony prominences    Moisture Interventions: Absorbent underpads, Check for incontinence Q2 hours and as needed    Activity Interventions: Increase time out of bed, Pressure redistribution bed/mattress(bed type)    Mobility Interventions: Pressure redistribution bed/mattress (bed type), PT/OT evaluation    Nutrition Interventions: Offer support with meals,snacks and hydration    Friction and Shear Interventions: Foam dressings/transparent film/skin sealants                Problem: Patient Education: Go to Patient Education Activity  Goal: Patient/Family Education  Outcome: Progressing Towards Goal

## 2021-01-10 NOTE — PROGRESS NOTES
[facility]    Follow-Up on: JOSSELIN on CKD more likely ESRD    HPI: Pt seen and examined, no new complaints.      ROS:  General: no fever/chills, appetite ok  CV: no CP  Lung: no SOB, no cough  GI: no N/V/D  Ext: no edema     Current Facility-Administered Medications   Medication Dose Route Frequency    labetaloL (NORMODYNE) tablet 400 mg  400 mg Oral TID    amLODIPine (NORVASC) tablet 5 mg  5 mg Oral BID    hydrALAZINE (APRESOLINE) tablet 50 mg  50 mg Oral Q6H PRN    senna-docusate (PERICOLACE) 8.6-50 mg per tablet 1 Tab  1 Tab Oral DAILY PRN    heparin (porcine) 1,000 unit/mL injection 5,000 Units  5,000 Units Hemodialysis DIALYSIS PRN    midazolam (VERSED) injection 0.5-2 mg  0.5-2 mg IntraVENous Multiple    epoetin marianne-epbx (RETACRIT) injection 10,000 Units  10,000 Units SubCUTAneous Q7D    midazolam (VERSED) injection 0.25-2 mg  0.25-2 mg IntraVENous Multiple    diphenhydrAMINE (BENADRYL) injection 50 mg  50 mg IntraVENous Multiple    HYDROcodone-acetaminophen (NORCO)  mg tablet 1 Tab  1 Tab Oral Q6H PRN    labetaloL (NORMODYNE;TRANDATE) injection 20 mg  20 mg IntraVENous Q1H PRN    cloNIDine HCL (CATAPRES) tablet 0.2 mg  0.2 mg Oral BID    sevelamer carbonate (RENVELA) tab 800 mg  800 mg Oral TID WITH MEALS    famotidine (PEPCID) tablet 20 mg  20 mg Oral DAILY    insulin lispro (HUMALOG) injection 0-10 Units  0-10 Units SubCUTAneous AC&HS    lip protectant (BLISTEX) ointment 1 Each  1 Each Topical PRN    alum-mag hydroxide-simeth (MYLANTA) oral suspension 30 mL  30 mL Oral Q4H PRN    heparin (porcine) injection 5,000 Units  5,000 Units SubCUTAneous Q8H    sodium chloride (NS) flush 5-40 mL  5-40 mL IntraVENous Q8H    sodium chloride (NS) flush 5-40 mL  5-40 mL IntraVENous PRN    acetaminophen (TYLENOL) tablet 650 mg  650 mg Oral Q6H PRN    Or    acetaminophen (TYLENOL) suppository 650 mg  650 mg Rectal Q6H PRN    polyethylene glycol (MIRALAX) packet 17 g  17 g Oral DAILY PRN  promethazine (PHENERGAN) tablet 12.5 mg  12.5 mg Oral Q6H PRN       Exam:  Vitals:    01/10/21 0415 01/10/21 0724 01/10/21 1135 01/10/21 1521   BP: 138/89 (!) 147/79 (!) 142/84 (!) 134/91   Pulse: 79 86 78 77   Resp: 18 18 18 18   Temp: 98.3 °F (36.8 °C) 98.5 °F (36.9 °C) 98.2 °F (36.8 °C) 98.3 °F (36.8 °C)   SpO2: 96% 97% 95% 100%   Weight:       Height:           No intake or output data in the 24 hours ending 01/10/21 1648  PE:  GEN - in no distress, alert and oriented  CV - regular rate. S1, S2, no murmur, no rub  Lung - clear bilaterally  Abd - soft, nontender  Ext - no edema  Access: right TCC- intact     Labs  Recent Labs     01/10/21  0539 01/09/21  0551 01/08/21  0455   WBC 6.3 6.0 6.7   HGB 9.7* 9.4* 8.8*   HCT 31.6* 30.9* 28.9*   PLT 60* 115* 111*     Recent Labs     01/10/21  0539 01/09/21  0551 01/08/21  0455    139 137   K 3.9 4.2 4.1    106 104   CO2 28 26 26   BUN 20 26* 24*   CREA 5.28* 7.19* 6.26*   * 126* 132*   CA 9.0 9.0 9.1   PHOS 3.5 5.0* 4.2     No results for input(s): PH, PCO2, PO2, PCO2 in the last 72 hours. Problem List:  Patient Active Problem List    Diagnosis Date Noted    Acute renal failure on dialysis [facility]) 12/31/2020    Acute renal failure (ARF) ([facility] [address] 12/24/2020    Elevated procalcitonin 12/24/2020    Obesity 12/24/2020    Hx of essential hypertension 12/24/2020    Microcytic anemia 12/24/2020       Issues Addressed By Nephrology:    Plan:  1. JOSSELIN /ESRD-seen on HD   Pt continues to get education about dialysis and kidney failure and what to expect. recommend she read up on dialysis and transplant at [email or web address].   2. S/p Renal bx c/w collapsing glomerulopathy FSGS; would like to try some high dose prednisone once pt agrees to the side-effects and risk benefit ratio  3. First dialysis 12/24  4. Marizol Medina 85 placed 12/30/21  5. Disposition awaiting outpt slot under JOSSELIN  6.  Anemia on alie   HTN start BP meds  D/W pt potentially a 3 month trial of prednisone to see if any reversible component to her renal failure. She is willing to give it a try. I will start today and she will need a taper out pt. As wellas meds for DM. Pt voices understanding and wishes to proceed.

## 2021-01-11 VITALS
WEIGHT: 272 LBS | TEMPERATURE: 97.7 F | DIASTOLIC BLOOD PRESSURE: 81 MMHG | OXYGEN SATURATION: 99 % | SYSTOLIC BLOOD PRESSURE: 127 MMHG | BODY MASS INDEX: 45.32 KG/M2 | HEIGHT: 65 IN | RESPIRATION RATE: 18 BRPM | HEART RATE: 80 BPM

## 2021-01-11 LAB
ALBUMIN SERPL-MCNC: 2.3 G/DL (ref 3.5–5)
ALBUMIN/GLOB SERPL: 0.4 {RATIO} (ref 1.2–3.5)
ALP SERPL-CCNC: 128 U/L (ref 50–136)
ALT SERPL-CCNC: 14 U/L (ref 12–65)
ANION GAP SERPL CALC-SCNC: 6 MMOL/L (ref 7–16)
AST SERPL-CCNC: 13 U/L (ref 15–37)
BASOPHILS # BLD: 0.1 K/UL (ref 0–0.2)
BASOPHILS NFR BLD: 1 % (ref 0–2)
BILIRUB SERPL-MCNC: 0.4 MG/DL (ref 0.2–1.1)
BUN SERPL-MCNC: 27 MG/DL (ref 6–23)
CALCIUM SERPL-MCNC: 9.8 MG/DL (ref 8.3–10.4)
CHLORIDE SERPL-SCNC: 103 MMOL/L (ref 98–107)
CO2 SERPL-SCNC: 26 MMOL/L (ref 21–32)
COVID-19 RAPID TEST, COVR: NOT DETECTED
CREAT SERPL-MCNC: 6.23 MG/DL (ref 0.6–1)
DIFFERENTIAL METHOD BLD: ABNORMAL
EOSINOPHIL # BLD: 0 K/UL (ref 0–0.8)
EOSINOPHIL NFR BLD: 0 % (ref 0.5–7.8)
ERYTHROCYTE [DISTWIDTH] IN BLOOD BY AUTOMATED COUNT: 13.6 % (ref 11.9–14.6)
GLOBULIN SER CALC-MCNC: 5.8 G/DL (ref 2.3–3.5)
GLUCOSE BLD STRIP.AUTO-MCNC: 131 MG/DL (ref 65–100)
GLUCOSE BLD STRIP.AUTO-MCNC: 152 MG/DL (ref 65–100)
GLUCOSE BLD STRIP.AUTO-MCNC: 162 MG/DL (ref 65–100)
GLUCOSE SERPL-MCNC: 149 MG/DL (ref 65–100)
HCT VFR BLD AUTO: 32 % (ref 35.8–46.3)
HGB BLD-MCNC: 10 G/DL (ref 11.7–15.4)
IMM GRANULOCYTES # BLD AUTO: 0.1 K/UL (ref 0–0.5)
IMM GRANULOCYTES NFR BLD AUTO: 1 % (ref 0–5)
LYMPHOCYTES # BLD: 1.1 K/UL (ref 0.5–4.6)
LYMPHOCYTES NFR BLD: 16 % (ref 13–44)
MCH RBC QN AUTO: 25.1 PG (ref 26.1–32.9)
MCHC RBC AUTO-ENTMCNC: 31.3 G/DL (ref 31.4–35)
MCV RBC AUTO: 80.2 FL (ref 79.6–97.8)
MONOCYTES # BLD: 0.2 K/UL (ref 0.1–1.3)
MONOCYTES NFR BLD: 3 % (ref 4–12)
NEUTS SEG # BLD: 5.6 K/UL (ref 1.7–8.2)
NEUTS SEG NFR BLD: 79 % (ref 43–78)
NRBC # BLD: 0 K/UL (ref 0–0.2)
PHOSPHATE SERPL-MCNC: 3.5 MG/DL (ref 2.5–4.5)
PLATELET # BLD AUTO: 116 K/UL (ref 150–450)
PMV BLD AUTO: 10.4 FL (ref 9.4–12.3)
POTASSIUM SERPL-SCNC: 4.6 MMOL/L (ref 3.5–5.1)
PROT SERPL-MCNC: 8.1 G/DL (ref 6.3–8.2)
RBC # BLD AUTO: 3.99 M/UL (ref 4.05–5.2)
SARS COV-2, XPGCVT: NEGATIVE
SODIUM SERPL-SCNC: 135 MMOL/L (ref 136–145)
SOURCE, COVRS: NORMAL
WBC # BLD AUTO: 7.1 K/UL (ref 4.3–11.1)

## 2021-01-11 PROCEDURE — 74011250637 HC RX REV CODE- 250/637: Performed by: STUDENT IN AN ORGANIZED HEALTH CARE EDUCATION/TRAINING PROGRAM

## 2021-01-11 PROCEDURE — 74011250637 HC RX REV CODE- 250/637: Performed by: HOSPITALIST

## 2021-01-11 PROCEDURE — 85025 COMPLETE CBC W/AUTO DIFF WBC: CPT

## 2021-01-11 PROCEDURE — 80053 COMPREHEN METABOLIC PANEL: CPT

## 2021-01-11 PROCEDURE — 74011250637 HC RX REV CODE- 250/637: Performed by: INTERNAL MEDICINE

## 2021-01-11 PROCEDURE — 74011250636 HC RX REV CODE- 250/636: Performed by: STUDENT IN AN ORGANIZED HEALTH CARE EDUCATION/TRAINING PROGRAM

## 2021-01-11 PROCEDURE — 82962 GLUCOSE BLOOD TEST: CPT

## 2021-01-11 PROCEDURE — 74011250637 HC RX REV CODE- 250/637: Performed by: NURSE PRACTITIONER

## 2021-01-11 PROCEDURE — 87635 SARS-COV-2 COVID-19 AMP PRB: CPT

## 2021-01-11 PROCEDURE — 84100 ASSAY OF PHOSPHORUS: CPT

## 2021-01-11 PROCEDURE — 36415 COLL VENOUS BLD VENIPUNCTURE: CPT

## 2021-01-11 PROCEDURE — 74011636637 HC RX REV CODE- 636/637: Performed by: STUDENT IN AN ORGANIZED HEALTH CARE EDUCATION/TRAINING PROGRAM

## 2021-01-11 RX ORDER — CLONIDINE HYDROCHLORIDE 0.2 MG/1
0.2 TABLET ORAL 2 TIMES DAILY
Qty: 60 TAB | Refills: 1 | Status: SHIPPED | OUTPATIENT
Start: 2021-01-11 | End: 2021-02-16 | Stop reason: SDUPTHER

## 2021-01-11 RX ORDER — HYDRALAZINE HYDROCHLORIDE 50 MG/1
50 TABLET, FILM COATED ORAL ONCE
Status: COMPLETED | OUTPATIENT
Start: 2021-01-11 | End: 2021-01-11

## 2021-01-11 RX ORDER — LABETALOL 200 MG/1
400 TABLET, FILM COATED ORAL 3 TIMES DAILY
Qty: 180 TAB | Refills: 0 | Status: SHIPPED | OUTPATIENT
Start: 2021-01-11 | End: 2021-02-16 | Stop reason: SDUPTHER

## 2021-01-11 RX ORDER — INSULIN LISPRO 100 [IU]/ML
INJECTION, SOLUTION INTRAVENOUS; SUBCUTANEOUS
Qty: 1 PACKAGE | Refills: 1 | Status: SHIPPED | OUTPATIENT
Start: 2021-01-11 | End: 2021-01-12 | Stop reason: SDUPTHER

## 2021-01-11 RX ORDER — AMLODIPINE BESYLATE 10 MG/1
10 TABLET ORAL DAILY
Qty: 30 TAB | Refills: 1 | Status: SHIPPED | OUTPATIENT
Start: 2021-01-11 | End: 2021-02-16 | Stop reason: SDUPTHER

## 2021-01-11 RX ORDER — INSULIN PUMP SYRINGE, 3 ML
EACH MISCELLANEOUS
Qty: 1 KIT | Refills: 1 | Status: SHIPPED | OUTPATIENT
Start: 2021-01-11

## 2021-01-11 RX ORDER — SEVELAMER CARBONATE 800 MG/1
800 TABLET, FILM COATED ORAL
Qty: 90 TAB | Refills: 1 | Status: SHIPPED | OUTPATIENT
Start: 2021-01-11 | End: 2021-02-16 | Stop reason: SDUPTHER

## 2021-01-11 RX ADMIN — INSULIN LISPRO 2 UNITS: 100 INJECTION, SOLUTION INTRAVENOUS; SUBCUTANEOUS at 08:14

## 2021-01-11 RX ADMIN — Medication 5 ML: at 04:29

## 2021-01-11 RX ADMIN — LABETALOL HYDROCHLORIDE 400 MG: 200 TABLET, FILM COATED ORAL at 08:14

## 2021-01-11 RX ADMIN — CLONIDINE HYDROCHLORIDE 0.2 MG: 0.1 TABLET ORAL at 08:13

## 2021-01-11 RX ADMIN — SEVELAMER CARBONATE 800 MG: 800 TABLET, FILM COATED ORAL at 08:13

## 2021-01-11 RX ADMIN — HYDRALAZINE HYDROCHLORIDE 50 MG: 50 TABLET, FILM COATED ORAL at 04:28

## 2021-01-11 RX ADMIN — HEPARIN SODIUM 5000 UNITS: 5000 INJECTION INTRAVENOUS; SUBCUTANEOUS at 04:28

## 2021-01-11 RX ADMIN — FAMOTIDINE 20 MG: 20 TABLET, FILM COATED ORAL at 08:13

## 2021-01-11 RX ADMIN — HEPARIN SODIUM 5000 UNITS: 5000 INJECTION INTRAVENOUS; SUBCUTANEOUS at 12:01

## 2021-01-11 RX ADMIN — AMLODIPINE BESYLATE 5 MG: 5 TABLET ORAL at 08:13

## 2021-01-11 RX ADMIN — Medication 10 ML: at 14:00

## 2021-01-11 RX ADMIN — SEVELAMER CARBONATE 800 MG: 800 TABLET, FILM COATED ORAL at 12:01

## 2021-01-11 RX ADMIN — INSULIN LISPRO 2 UNITS: 100 INJECTION, SOLUTION INTRAVENOUS; SUBCUTANEOUS at 12:01

## 2021-01-11 NOTE — PROGRESS NOTES
01/11/21 0359   Vitals   Temp 97.8 °F (36.6 °C)   Temp Source Oral   Pulse (Heart Rate) 84   Heart Rate Source Monitor   Resp Rate 18   O2 Sat (%) 96 %   Level of Consciousness Alert   BP (!) 169/64   MAP (Calculated) 99   BP 1 Location Right arm   BP 1 Method Automatic   BP Patient Position At rest   MEWS Score 1     Pt hypertensive. Pt has PRN IV labetalol for SBP >150. Hospitalist notified. Orders received for 50 mg hydralazine PO once.

## 2021-01-11 NOTE — PROGRESS NOTES
Problem: Falls - Risk of  Goal: *Absence of Falls  Description: Document Vahid Naranjo Fall Risk and appropriate interventions in the flowsheet. Outcome: Progressing Towards Goal  Note: Fall Risk Interventions:            Medication Interventions: Evaluate medications/consider consulting pharmacy, Patient to call before getting OOB, Teach patient to arise slowly    Elimination Interventions: Call light in reach              Problem: Patient Education: Go to Patient Education Activity  Goal: Patient/Family Education  Outcome: Progressing Towards Goal     Problem: Diabetes Self-Management  Goal: *Disease process and treatment process  Description: Define diabetes and identify own type of diabetes; list 3 options for treating diabetes. Outcome: Progressing Towards Goal  Goal: *Incorporating nutritional management into lifestyle  Description: Describe effect of type, amount and timing of food on blood glucose; list 3 methods for planning meals. Outcome: Progressing Towards Goal  Goal: *Incorporating physical activity into lifestyle  Description: State effect of exercise on blood glucose levels. Outcome: Progressing Towards Goal  Goal: *Developing strategies to promote health/change behavior  Description: Define the ABC's of diabetes; identify appropriate screenings, schedule and personal plan for screenings. Outcome: Progressing Towards Goal  Goal: *Using medications safely  Description: State effect of diabetes medications on diabetes; name diabetes medication taking, action and side effects. Outcome: Progressing Towards Goal  Goal: *Monitoring blood glucose, interpreting and using results  Description: Identify recommended blood glucose targets  and personal targets. Outcome: Progressing Towards Goal  Goal: *Prevention, detection, treatment of acute complications  Description: List symptoms of hyper- and hypoglycemia; describe how to treat low blood sugar and actions for lowering  high blood glucose level.   Outcome: Progressing Towards Goal  Goal: *Prevention, detection and treatment of chronic complications  Description: Define the natural course of diabetes and describe the relationship of blood glucose levels to long term complications of diabetes. Outcome: Progressing Towards Goal  Goal: *Developing strategies to address psychosocial issues  Description: Describe feelings about living with diabetes; identify support needed and support network  Outcome: Progressing Towards Goal     Problem: Patient Education: Go to Patient Education Activity  Goal: Patient/Family Education  Outcome: Progressing Towards Goal     Problem: Acute Renal Failure: Discharge Outcomes  Goal: *Optimal pain control at patient's stated goal  Outcome: Progressing Towards Goal  Goal: *Urinary output within identified parameters  Outcome: Progressing Towards Goal  Goal: *Hemodynamically stable  Outcome: Progressing Towards Goal  Goal: *Tolerating diet  Outcome: Progressing Towards Goal  Goal: *Lab values stabilized  Outcome: Progressing Towards Goal  Goal: *Verbalizes understanding and describes medication purposes and frequencies  Outcome: Progressing Towards Goal  Goal: *Medication reconciliation  Outcome: Progressing Towards Goal     Problem: Pressure Injury - Risk of  Goal: *Prevention of pressure injury  Description: Document Oneal Scale and appropriate interventions in the flowsheet.   Outcome: Progressing Towards Goal  Note: Pressure Injury Interventions:  Sensory Interventions: Assess changes in LOC, Avoid rigorous massage over bony prominences    Moisture Interventions: Absorbent underpads, Check for incontinence Q2 hours and as needed    Activity Interventions: Increase time out of bed, Pressure redistribution bed/mattress(bed type), PT/OT evaluation    Mobility Interventions: Pressure redistribution bed/mattress (bed type), PT/OT evaluation    Nutrition Interventions: Document food/fluid/supplement intake    Friction and Shear Interventions: Foam dressings/transparent film/skin sealants                Problem: Patient Education: Go to Patient Education Activity  Goal: Patient/Family Education  Outcome: Progressing Towards Goal

## 2021-01-11 NOTE — PROGRESS NOTES
TC to [facility].  They have a slot for the pt but she will need an updated COVID test.  They will accept a rapid which has now been ordered. The slot is a MWF slot (which they just now notified this writer it is all that is available). SW sent Perfect [employer] message to Dr. Gabby Sorenson with an update so that a decision can be reached if the pt needs to dialyze here today and start there on Wednesday or if she is able to wait until Wednesday to dialyze again. Awaiting a response. 1140:  Message sent to Dr. Cintia Hall who is covering today. 1201:  COVID result faxed to  [facility].    1202:  Per Dr. Cintia Hall, it is not necessary for the pt to dialyze tomorrow and can have her next run on Wednesday at the outpatient clinic. Dr. Hoang Arroyo notified. 1220: Outpt HD slot obtained. [facility] on MWF at 1:30pm.  Information placed in pt's AVS.  Pt is instructed to arrive at 1:15pm this Wednesday to complete paperwork prior to her first appointment. 1710:  Pt is medically cleared for dc to home today. Care Management Interventions  PCP Verified by CM: Yes(pt goes to [facility] or [facility]gent Care for needs)  Mode of Transport at Discharge: Other (see comment)(family)  Transition of Care Consult (CM Consult): Other(Outpt HD slot)  Discharge Durable Medical Equipment: No  Physical Therapy Consult: No  Occupational Therapy Consult: No  Speech Therapy Consult: No  Current Support Network: Own Home, Family Lives Nearby  Confirm Follow Up Transport: Self  The Plan for Transition of Care is Related to the Following Treatment Goals : Outpatient dialysis services.   The Patient and/or Patient Representative was Provided with a Choice of Provider and Agrees with the Discharge Plan?: Yes  Discharge Location  Discharge Placement: Home with family assistance

## 2021-01-11 NOTE — PROGRESS NOTES
[facility]    Follow-Up on: JOSSELIN on CKD more likely ESRD    HPI: Pt seen and examined, no new complaints.      ROS:  General: no fever/chills, appetite ok  CV: no CP  Lung: no SOB, no cough  GI: no N/V/D  Ext: no edema     Current Facility-Administered Medications   Medication Dose Route Frequency    predniSONE (DELTASONE) tablet 60 mg  60 mg Oral DAILY WITH BREAKFAST    labetaloL (NORMODYNE) tablet 400 mg  400 mg Oral TID    amLODIPine (NORVASC) tablet 5 mg  5 mg Oral BID    hydrALAZINE (APRESOLINE) tablet 50 mg  50 mg Oral Q6H PRN    senna-docusate (PERICOLACE) 8.6-50 mg per tablet 1 Tab  1 Tab Oral DAILY PRN    heparin (porcine) 1,000 unit/mL injection 5,000 Units  5,000 Units Hemodialysis DIALYSIS PRN    midazolam (VERSED) injection 0.5-2 mg  0.5-2 mg IntraVENous Multiple    epoetin marianne-epbx (RETACRIT) injection 10,000 Units  10,000 Units SubCUTAneous Q7D    midazolam (VERSED) injection 0.25-2 mg  0.25-2 mg IntraVENous Multiple    diphenhydrAMINE (BENADRYL) injection 50 mg  50 mg IntraVENous Multiple    HYDROcodone-acetaminophen (NORCO)  mg tablet 1 Tab  1 Tab Oral Q6H PRN    labetaloL (NORMODYNE;TRANDATE) injection 20 mg  20 mg IntraVENous Q1H PRN    cloNIDine HCL (CATAPRES) tablet 0.2 mg  0.2 mg Oral BID    sevelamer carbonate (RENVELA) tab 800 mg  800 mg Oral TID WITH MEALS    famotidine (PEPCID) tablet 20 mg  20 mg Oral DAILY    insulin lispro (HUMALOG) injection 0-10 Units  0-10 Units SubCUTAneous AC&HS    lip protectant (BLISTEX) ointment 1 Each  1 Each Topical PRN    alum-mag hydroxide-simeth (MYLANTA) oral suspension 30 mL  30 mL Oral Q4H PRN    heparin (porcine) injection 5,000 Units  5,000 Units SubCUTAneous Q8H    sodium chloride (NS) flush 5-40 mL  5-40 mL IntraVENous Q8H    sodium chloride (NS) flush 5-40 mL  5-40 mL IntraVENous PRN    acetaminophen (TYLENOL) tablet 650 mg  650 mg Oral Q6H PRN    Or    acetaminophen (TYLENOL) suppository 650 mg  650 mg Rectal Q6H PRN    polyethylene glycol (MIRALAX) packet 17 g  17 g Oral DAILY PRN    promethazine (PHENERGAN) tablet 12.5 mg  12.5 mg Oral Q6H PRN       Exam:  Vitals:    01/11/21 0014 01/11/21 0359 01/11/21 0715 01/11/21 1045   BP: (!) 142/91 (!) 169/64 (!) 167/77 130/75   Pulse: 75 84 84 87   Resp: 18 18 20 20   Temp: 97.6 °F (36.4 °C) 97.8 °F (36.6 °C) 97.7 °F (36.5 °C) 98.2 °F (36.8 °C)   SpO2: 97% 96% 99% 98%   Weight:       Height:           No intake or output data in the 24 hours ending 01/11/21 1149  PE:  GEN - in no distress, alert and oriented  CV - regular rate. S1, S2, no murmur, no rub  Lung - clear bilaterally  Abd - soft, nontender  Ext - no edema  Access: right TCC- intact     Labs  Recent Labs     01/11/21  0624 01/10/21  0539 01/09/21  0551   WBC 7.1 6.3 6.0   HGB 10.0* 9.7* 9.4*   HCT 32.0* 31.6* 30.9*   * 60* 115*     Recent Labs     01/11/21  0624 01/10/21  0539 01/09/21  0551   * 137 139   K 4.6 3.9 4.2    103 106   CO2 26 28 26   BUN 27* 20 26*   CREA 6.23* 5.28* 7.19*   * 121* 126*   CA 9.8 9.0 9.0   PHOS 3.5 3.5 5.0*     No results for input(s): PH, PCO2, PO2, PCO2 in the last 72 hours. Problem List:  Patient Active Problem List    Diagnosis Date Noted    Acute renal failure on dialysis [facility]) 12/31/2020    Acute renal failure (ARF) ([facility] [address] 12/24/2020    Elevated procalcitonin 12/24/2020    Obesity 12/24/2020    Hx of essential hypertension 12/24/2020    Microcytic anemia 12/24/2020       Issues Addressed By Nephrology:    Plan:  1. JOSSELIN /ESRD-HD in am if not discharged   2. S/p Renal bx c/w collapsing glomerulopathy FSGS; would like to try some high dose prednisone once pt agrees to the side-effects and risk benefit ratio  3. First dialysis 12/24  4. Marizol [address] placed 12/30/21  5. Disposition awaiting outpt slot under JOSSELIN  6. Anemia on alie   HTN start BP meds  D/W pt potentially a 3 month trial of prednisone to see if any reversible component to her renal failure.

## 2021-01-11 NOTE — DIABETES MGMT
Patient admitted with acute renal failure on dialysis. Blood glucose ranged 119-196 yesterday with patient receiving Humalog 2 units and Prednisone 60mg. Blood glucose this morning was 162. Reviewed patient current regimen: Humalog SSI and Prednisone 60mg daily. Glucose overall stable on current regimen. Per nephrology note patient to potentially be on a 3 month trial of prednisone. Will continue to follow along loosely as steroid therapy may increase patient insulin needs. Pt will need prescription for glucometer and glucometer supplies at discharge so that the patient may obtain the meter covered by their insurance. Patient prefers insulin pens. Patient will need prescription for insulin pens and pen needles at discharge.

## 2021-01-12 RX ORDER — INSULIN LISPRO 100 [IU]/ML
INJECTION, SOLUTION INTRAVENOUS; SUBCUTANEOUS
Qty: 1 PACKAGE | Refills: 1 | Status: SHIPPED | OUTPATIENT
Start: 2021-01-12 | End: 2021-01-12 | Stop reason: SDUPTHER

## 2021-01-12 RX ORDER — INSULIN LISPRO 100 [IU]/ML
INJECTION, SOLUTION INTRAVENOUS; SUBCUTANEOUS
Qty: 1 PACKAGE | Refills: 1 | Status: SHIPPED | OUTPATIENT
Start: 2021-01-12

## 2021-01-12 NOTE — PROGRESS NOTES
Patient given discharge instructions with time to ask questions. Patient discharged with belonging in hand and entered car safely.

## 2021-01-12 NOTE — PROGRESS NOTES
Hospitalist Note     Admit Date:  2020 10:57 PM   Name:  Kellen Santos   Age:  37 y.o.  :  1985   MRN:  [id number]   PCP:  Cameron Han MD  Treatment Team: Consulting Provider: David Larios NP; Utilization Review: Donna Nieves RN; Tech: Armando Shoemaker; Utilization Review: Franco Santos; Care Manager: Preethi Bates [facility]; Medical Assistant: Armando Coffey RN; Utilization Review: Lucien Nieto Charge Nurse: Jd Navas Utilization Review: Gerald Jordan    HPI/Subjective:       Ms. Bonnie Kaplan is a 36 yo female with PMH of HTN, DM2, admitted with JOSSELIN. She has been seen by nephrology and required hemodialysis that likely will be needed longterm. TCC placed per IR. Completed course of empiric rocephin 7 days, urine cx skin jae. She is s/p renal biopsy path showing \"inflammation, collapsing glomerulopathy and hypertensive changes\", per nephrology she is not quite end stage disease but high risk. She is pending outpatient HD slot. Discharge is expected to home with mother. 21 ready to go home but still needs slot, some anorexia, no BM change, no dyspnea     1/3/21 patient sitting up in chair this morning. Feeling well. Currently awaiting outpatient hemodialysis slot. No chest pain no palpitations. 1/10  No acute events overnight. Patient clinically stable. Still awaiting chair placement for dialysis. BP elevated    Objective:     Patient Vitals for the past 24 hrs:   Temp Pulse Resp BP SpO2   21 1544 97.7 °F (36.5 °C) 80 18 127/81 99 %   21 1045 98.2 °F (36.8 °C) 87 20 130/75 98 %   21 0715 97.7 °F (36.5 °C) 84 20 (!) 167/77 99 %   21 0359 97.8 °F (36.6 °C) 84 18 (!) 169/64 96 %   21 0014 97.6 °F (36.4 °C) 75 18 (!) 142/91 97 %   01/10/21 1951 98.1 °F (36.7 °C) 75 17 (!) 143/87 99 %     Oxygen Therapy  O2 Sat (%): 99 % (21 1544)  Pulse via Oximetry: 84 beats per minute (20 1133)  O2 Device: Room air (01/05/21 1155)  O2 Flow Rate (L/min): 3 l/min (12/30/20 0840)  ETCO2 (mmHg): 42 mmHg (12/29/20 1054)    Estimated body mass index is 45.26 kg/m² as calculated from the following:    Height as of this encounter: 5' 5\" (1.651 m). Weight as of this encounter: 123.4 kg (272 lb). No intake or output data in the 24 hours ending 01/11/21 1923    *Note that automatically entered I/Os may not be accurate; dependent on patient compliance with collection and accurate  by techs. General:    Well nourished. Alert. No distress, obese  CV:   RRR. No murmur, rub, or gallop. No edema   Lungs:   CTAB. No wheezing, rhonchi, or rales. Abdomen:   Soft, nontender, nondistended. Obese, present BS  Extremities: Warm and dry. Skin:     No rashes or jaundice. Neuro:  No gross focal deficits    Data Review:  I have reviewed all labs, meds, and studies from the last 24 hours:  Recent Results (from the past 24 hour(s))   GLUCOSE, POC    Collection Time: 01/10/21  8:43 PM   Result Value Ref Range    Glucose (POC) 119 (H) 65 - 100 mg/dL   CBC WITH AUTOMATED DIFF    Collection Time: 01/11/21  6:24 AM   Result Value Ref Range    WBC 7.1 4.3 - 11.1 K/uL    RBC 3.99 (L) 4.05 - 5.2 M/uL    HGB 10.0 (L) 11.7 - 15.4 g/dL    HCT 32.0 (L) 35.8 - 46.3 %    MCV 80.2 79.6 - 97.8 FL    MCH 25.1 (L) 26.1 - 32.9 PG    MCHC 31.3 (L) 31.4 - 35.0 g/dL    RDW 13.6 11.9 - 14.6 %    PLATELET 678 (L) 614 - 450 K/uL    MPV 10.4 9.4 - 12.3 FL    ABSOLUTE NRBC 0.00 0.0 - 0.2 K/uL    NEUTROPHILS 79 (H) 43 - 78 %    LYMPHOCYTES 16 13 - 44 %    MONOCYTES 3 (L) 4.0 - 12.0 %    EOSINOPHILS 0 (L) 0.5 - 7.8 %    BASOPHILS 1 0.0 - 2.0 %    IMMATURE GRANULOCYTES 1 0.0 - 5.0 %    ABS. NEUTROPHILS 5.6 1.7 - 8.2 K/UL    ABS. LYMPHOCYTES 1.1 0.5 - 4.6 K/UL    ABS. MONOCYTES 0.2 0.1 - 1.3 K/UL    ABS. EOSINOPHILS 0.0 0.0 - 0.8 K/UL    ABS. BASOPHILS 0.1 0.0 - 0.2 K/UL    ABS. IMM.  GRANS. 0.1 0.0 - 0.5 K/UL    DF AUTOMATED     METABOLIC PANEL, COMPREHENSIVE    Collection Time: 01/11/21  6:24 AM   Result Value Ref Range    Sodium 135 (L) 136 - 145 mmol/L    Potassium 4.6 3.5 - 5.1 mmol/L    Chloride 103 98 - 107 mmol/L    CO2 26 21 - 32 mmol/L    Anion gap 6 (L) 7 - 16 mmol/L    Glucose 149 (H) 65 - 100 mg/dL    BUN 27 (H) 6 - 23 MG/DL    Creatinine 6.23 (H) 0.6 - 1.0 MG/DL    GFR est AA 10 (L) >60 ml/min/1.73m2    GFR est non-AA 8 (L) >60 ml/min/1.73m2    Calcium 9.8 8.3 - 10.4 MG/DL    Bilirubin, total 0.4 0.2 - 1.1 MG/DL    ALT (SGPT) 14 12 - 65 U/L    AST (SGOT) 13 (L) 15 - 37 U/L    Alk.  phosphatase 128 50 - 136 U/L    Protein, total 8.1 6.3 - 8.2 g/dL    Albumin 2.3 (L) 3.5 - 5.0 g/dL    Globulin 5.8 (H) 2.3 - 3.5 g/dL    A-G Ratio 0.4 (L) 1.2 - 3.5     PHOSPHORUS    Collection Time: 01/11/21  6:24 AM   Result Value Ref Range    Phosphorus 3.5 2.5 - 4.5 MG/DL   GLUCOSE, POC    Collection Time: 01/11/21  7:17 AM   Result Value Ref Range    Glucose (POC) 162 (H) 65 - 100 mg/dL   GLUCOSE, POC    Collection Time: 01/11/21 10:44 AM   Result Value Ref Range    Glucose (POC) 152 (H) 65 - 100 mg/dL   SARS-COV-2    Collection Time: 01/11/21 11:08 AM   Result Value Ref Range    Specimen source Nasopharyngeal      COVID-19 rapid test Not detected NOTD      SARS CoV-2 PENDING    GLUCOSE, POC    Collection Time: 01/11/21  3:43 PM   Result Value Ref Range    Glucose (POC) 131 (H) 65 - 100 mg/dL        Current Meds:  Current Facility-Administered Medications   Medication Dose Route Frequency    predniSONE (DELTASONE) tablet 60 mg  60 mg Oral DAILY WITH BREAKFAST    labetaloL (NORMODYNE) tablet 400 mg  400 mg Oral TID    amLODIPine (NORVASC) tablet 5 mg  5 mg Oral BID    hydrALAZINE (APRESOLINE) tablet 50 mg  50 mg Oral Q6H PRN    senna-docusate (PERICOLACE) 8.6-50 mg per tablet 1 Tab  1 Tab Oral DAILY PRN    heparin (porcine) 1,000 unit/mL injection 5,000 Units  5,000 Units Hemodialysis DIALYSIS PRN    epoetin marianne-epbx (RETACRIT) injection 10,000 Units 10,000 Units SubCUTAneous Q7D    diphenhydrAMINE (BENADRYL) injection 50 mg  50 mg IntraVENous Multiple    HYDROcodone-acetaminophen (NORCO)  mg tablet 1 Tab  1 Tab Oral Q6H PRN    labetaloL (NORMODYNE;TRANDATE) injection 20 mg  20 mg IntraVENous Q1H PRN    cloNIDine HCL (CATAPRES) tablet 0.2 mg  0.2 mg Oral BID    sevelamer carbonate (RENVELA) tab 800 mg  800 mg Oral TID WITH MEALS    famotidine (PEPCID) tablet 20 mg  20 mg Oral DAILY    insulin lispro (HUMALOG) injection 0-10 Units  0-10 Units SubCUTAneous AC&HS    lip protectant (BLISTEX) ointment 1 Each  1 Each Topical PRN    alum-mag hydroxide-simeth (MYLANTA) oral suspension 30 mL  30 mL Oral Q4H PRN    heparin (porcine) injection 5,000 Units  5,000 Units SubCUTAneous Q8H    sodium chloride (NS) flush 5-40 mL  5-40 mL IntraVENous Q8H    sodium chloride (NS) flush 5-40 mL  5-40 mL IntraVENous PRN    acetaminophen (TYLENOL) tablet 650 mg  650 mg Oral Q6H PRN    Or    acetaminophen (TYLENOL) suppository 650 mg  650 mg Rectal Q6H PRN    polyethylene glycol (MIRALAX) packet 17 g  17 g Oral DAILY PRN    promethazine (PHENERGAN) tablet 12.5 mg  12.5 mg Oral Q6H PRN     Current Outpatient Medications   Medication Sig    amLODIPine (NORVASC) 10 mg tablet Take 1 Tab by mouth daily.  cloNIDine HCL (CATAPRES) 0.2 mg tablet Take 1 Tab by mouth two (2) times a day.  sevelamer carbonate (RENVELA) 800 mg tab tab Take 1 Tab by mouth three (3) times daily (with meals).  labetaloL (NORMODYNE) 200 mg tablet Take 2 Tabs by mouth three (3) times daily.  insulin lispro (HUMALOG) 100 unit/mL kwikpen Less than 150 =   0 units           150 -199 =   2 units  200 -249 =   4 units  250 -299 =   6 units  300 -349 =   8 units  350 and above = 10 units        Other Studies:  No results found for this visit on 12/23/20. No results found.     All Micro Results     Procedure Component Value Units Date/Time    CULTURE, URINE [[id number]] Collected: 12/24/20 0016    Order Status: Completed Specimen: Cath Urine Updated: 12/26/20 0719     Special Requests: NO SPECIAL REQUESTS        Culture result:       >100,000 COLONIES/mL MIXED SKIN AZEEM ISOLATED                  THREE OR MORE TYPES OF ORGANISMS ARE PRESENT. THIS IS INDICATIVE OF CONTAMINATION DUE TO IMPROPER COLLECTION TECHNIQUE. PLEASE REPEAT COLLECTION UNLESS PATIENT HAS STARTED ANTIBIOTIC TREATMENT. SARS-CoV-2 Lab Results  \"Novel Coronavirus\" Test: No results found for: COV2NT   \"Emergent Disease\" Test: No results found for: EDPR  \"SARS-COV-2\" Test: No results found for: XGCOVT  Rapid Test:   Lab Results   Component Value Date/Time    COVR Not detected 01/11/2021 11:08 AM            Assessment and Plan:     Hospital Problems as of 1/11/2021 Never Reviewed          Codes Class Noted - Resolved POA    * (Principal) Acute renal failure on dialysis [facility]) ICD-10-CM: N17.9, Z99.2  ICD-9-CM: 584.9, V45.11  12/31/2020 - Present Yes        Acute renal failure (ARF) ([facility] [address] ICD-10-CM: N17.9  ICD-9-CM: 584.9  12/24/2020 - Present Unknown        Elevated procalcitonin ICD-10-CM: R79.89  ICD-9-CM: 790.99  12/24/2020 - Present Unknown        Obesity ICD-10-CM: E66.9  ICD-9-CM: 278.00  12/24/2020 - Present Unknown        Hx of essential hypertension ICD-10-CM: Z86.79  ICD-9-CM: V12.59  12/24/2020 - Present Unknown        Microcytic anemia ICD-10-CM: D50.9  ICD-9-CM: 280.9  12/24/2020 - Present Unknown              Plan:      · New onset JOSSELIN progressed to CKD, high risk for ESRD-  on new hemodialysis:  · can discharge once has HD slot  · TCC in place   · Completed course of empiric rocephin 7 days      · HTN:  · Continued norvasc, clonidine, labetalol. Increase labetalol to 400mg tid      · DM2:  · On SSI  · DM2 education  · Minimal insulin use since admission. A1c is likely falsely elevated secondary to ESRD  · Needs Rx for glucometer/strips  · Check fructosamine      DC planning/Dispo:   To home once she has outpatient hemodialysis slot     Diet:  DIET RENAL  DVT ppx:  heparin    Signed:  Apurva Yang MD

## 2021-01-12 NOTE — DISCHARGE SUMMARY
Hospitalist Discharge Summary     Admit Date:  2020 10:57 PM   Name:  Adia Pollard   Age:  37 y.o.  :  1985   MRN:  [id number]   PCP:  Shreyas Cobos MD  Treatment Team: Consulting Provider: Ward Mcallister NP; Utilization Review: Carolyn Bolivar RN; Tech: Yogesh Mcgrath; Utilization Review: Naresh Tom; Care Manager: Gerhardt Duran, LMSW; Medical Assistant: Adelina Sierra RN; Utilization Review: Catherine John; Charge Nurse: Kalia Galvez; Utilization Review: Mark Altman    Problem List for this Hospitalization:  Hospital Problems as of 2021 Never Reviewed          Codes Class Noted - Resolved POA    * (Principal) Acute renal failure on dialysis [facility]) ICD-10-CM: N17.9, Z99.2  ICD-9-CM: 584.9, V45.11  2020 - Present Yes        Acute renal failure (ARF) ([facility] [address] ICD-10-CM: N17.9  ICD-9-CM: 584.9  2020 - Present Unknown        Elevated procalcitonin ICD-10-CM: R79.89  ICD-9-CM: 790.99  2020 - Present Unknown        Obesity ICD-10-CM: E66.9  ICD-9-CM: 278.00  2020 - Present Unknown        Hx of essential hypertension ICD-10-CM: Z86.79  ICD-9-CM: V12.59  2020 - Present Unknown        Microcytic anemia ICD-10-CM: D50.9  ICD-9-CM: 280.9  2020 - Present Unknown                  Hospital Course:  Ms. Paul Du is a 36 yo female with PMH of HTN, DM2, admitted with JOSSELIN. She has been seen by nephrology and required hemodialysis that likely will be needed longterm. TCC placed per IR. Renal bx c/w collapsing glomerulopathy FSGS. Pt declined trial of prednisone. Completed course of empiric rocephin 7 days, urine cx skin jae. Per nephrology she is not quite end stage disease but high risk. Her A1c was diabetic range 10.2 but may be falsely elevated in renal disease. She required minimal insulin and discharged with SSI. She was discharged with BP meds. She obtained an outpt HD slot.   US Renal [place] on MWF at 1:30pm.  She was instructed to follow up OP with nephro at HD on Wednesday and PMD. I answered all of her questions. Return precautions given. Disposition: Home or Self Care  Activity: Activity as tolerated  Diet: DIET RENAL Regular; Consistent Carb 1500-1600kcal  Code Status: Full Code    Follow up instructions, discharge meds at bottom of this note. Plan was discussed with patient. All questions answered. Patient was stable at time of discharge. Patient will call a physician or return if any concerns. Discharge summary was sent to PCP electronically via \"Comm Mgt\" link in [facility], if possible. Diagnostic Imaging/Tests:   Ir Insert Tunl Cvc W/o Port Over 5 Yr    Result Date: 12/30/2020  Title: Number temporary to tunneled hemodialysis catheter. Indication:  Acute renal failure. Catheter exchange requested. :  Gray Ramos PA-C Supervising Physician: Casandra Shore M.D. Consent: Informed written and oral consent was obtained from the patient after explanation of benefits and risks (including, but not limited to: Infection, hemorrhage, loss of access requiring new catheter placement). The patient's questions were answered to her satisfaction. She stated understanding and requested that we proceed. Procedure:  Maximal sterile barrier technique employed utilizing hat, facemask, hand hygiene, cutaneous antisepsis, sterile gown, sterile gloves and a large sterile drape. Following routine prep and drape of the RIGHT neck and chest, a local field block with lidocaine was achieved. Using fluoroscopy, the existing catheter was removed over a wire. A peel-away sheath was placed. The new catheter was pulled through subcutaneous tunnel and passed down the peel-away sheath. Both lumens aspirated easily and were filled with heparinized saline. The catheter was secured with a nonabsorbable suture. The skin venotomy site was approximated with nonabsorbable suture. Complications: None.  Radiation dose: Fluoroscopy time: 24 seconds. Reference air kerma (mGy): 9 Kerma area product (cGy.cm2):  253 Fluoroscopic images: 1 Contrast:  0 milliliters. Medications:  Under the direction and supervision of Taylor Godinez M.D., 26 minutes of moderate sedation was provided using fentanyl and Versed. Continuous pulse oximetry, CO2, heart rate and blood pressure monitored by a trained independent observer present. Findings:  New catheter tip is in the proximal right atrium. Impression: Technically exchange of temporary to tunneled hemodialysis catheter. Plan: Recover for 1 hour. Catheter is ready for use. Suture should be removed in 2 weeks. Us Retroperitoneum Comp    Result Date: 12/24/2020  Renal ultrasound, 12/24/2020 History: Acute kidney insufficiency. Comparison: None. Technique: Grayscale and doppler images of the kidneys and bladder were obtained using a 3-5 MHz linear transducer. Findings: The right kidney measures 15.3 cm, and the left kidney measures 14.9 cm in greatest longitudinal length. No stones or evidence of hydronephrosis is seen. Renal parenchymal echogenicity is increased consistent with chronic medical renal changes. The urinary bladder is collapsed about a Graff catheter and therefore not well assessed. The IVC is patent. The aorta is obscured by bowel gas and therefore not well assessed. IMPRESSION:   1. No hydronephrosis of the kidneys to suggest acute obstruction. Only bilateral enlargement and increased parenchymal echogenicity are seen which are likely chronic. Xr Chest Port    Result Date: 12/23/2020  EXAM: XR CHEST PORT INDICATION: dyspnea COMPARISON: None. FINDINGS: A portable AP radiograph of the chest was obtained at 2322 hours. The patient is on a cardiac monitor. The lungs are clear. The cardiac and mediastinal contours and pulmonary vascularity are normal.  The bones and soft tissues are grossly within normal limits.      IMPRESSION: Normal chest.    Ir Insert Non Lei Cvc Over 5 Yrs    Result Date: 12/24/2020  Title:  Temporary hemodialysis catheter placement. Ultrasound guidance for vascular access. Indication:  Acute renal failure. Consent: Informed written and oral consent was obtained from the patient after explanation of benefits and risks (including, but not limited to: Infection, hemorrhage, pneumothorax). The patient's questions were answered to their satisfaction. The patient stated understanding and requested that we proceed. Procedure:  Maximal sterile barrier technique (including:  cap, mask, sterile gown, sterile gloves, sterile sheet, hand hygiene, chlorhexidene for antiseptic skin preparation, sterile ultrasound techniques including sterile gel and sterile ultrasound probe cover) was used. Following routine prep and drape of the right neck, a local field block with lidocaine was achieved. Ultrasound evaluation of all potential access sites was performed due to lack of a palpable vein. No veins were palpable due to overlying adipose tissue. Using real-time ultrasound guidance, with appropriate image recording and visualization of vascular needle entry, the patent right internal jugular vein was accessed using micropuncture technique. Using fluoroscopy, a triple lumen temporary hemodialysis catheter was advanced over the wire positioning the tip in the right atrium. A permanent image was recorded. All 3 lumens aspirated easily and were filled with heparinized saline. The catheter was secured with nonabsorbable suture. Sterile dressings were applied. Complications: None. Radiation Exposure Indices: Cumulative dose: 3 mGy Contrast: None. Medications: Lidocaine for local analgesia. Findings:  Patent right internal jugular vein. Catheter tip in the proximal right atrium. Impression: Successful temporary hemodialysis catheter placement. Catheter is ready for use.      Ct Bx Renal Rt    Result Date: 12/29/2020  PROCEDURE: CT-guided left kidney core biopsy. Procedural Personnel Attending physician(s): Aung Velazquez M.D. Pre-procedure diagnosis: Pleasant 74-year-old woman with acute renal failure. Hypertension. Diabetes mellitus. Post-procedure diagnosis: Same Indication: Organ dysfunction Previous biopsy of same target (QCDR): No Additional clinical history: None Complications: No immediate complications. IMPRESSION:  CT-guided Core biopsy of inferior pole, left kidney. Plan:  Specimen(s) sent to the Pathology department for evaluation. 3 hours bedrest with close blood pressure monitoring. _______________________________________________________________ Technique: All CT scans at this facility are performed using dose reduction/dose modulation techniques, as appropriate to the performed exam, including the following:  Automated Exposure Control; Adjustment of the MA and/or kF according to patient size (this includes techniques or standardized protocols for targeted exams where dose is matched to indication/reason for exam); and Use of Iterative Reconstruction Technique. PROCEDURE SUMMARY: - Percutaneous CT-guided Coaxial Core Needle Biopsy - Additional procedure(s): Gelfoam administration PROCEDURE DETAILS: Pre-procedure Reference imaging for biopsy target: None Consent:  Informed written and oral consent for the procedure was obtained after explanation of risks (including, but not limitted to:  hemorrhage, infection, visceral injury, nondiagnostic biopsy) benefits and alternatives. The patient's questions were answered to their satisfaction. They stated understanding and requested that we proceed. Final verification:  A time-out identifying the patient and planned procedure was performed prior to this procedure. Preparation: (MIPS) Maximal sterile barrier technique (including:  cap, mask, sterile gown, sterile gloves, sterile sheet, hand hygiene, and cutaneous antisepsis) was used.   Anesthesia/sedation Level of anesthesia/sedation: Moderate sedation (conscious sedation) Anesthesia/sedation administered by: Independent trained observer under attending supervision with continuous monitoring of the patient?s level of consciousness and physiologic status Total intra-service sedation time (minutes): 23 Imaging prior to biopsy The patient was positioned prone. Initial imaging was performed using noncontrast CT. Biopsy target: - Maximal diameter (cm): 3.2 - Location: Inferior pole, left kidney Other findings: None Biopsy Local anesthesia was administered. Under CT guidance, the coaxial biopsy system was advanced to the target and Core biopsy was performed. Coaxial needle: 17 gauge Core needle biopsy device: Arrow Secure Command Core needle size: 18 gauge Number of core specimens: 5 Fine needle aspiration device: Not applicable Fine needle size: Not applicable Number of FNA specimens: Not applicable On-site biopsy touch preparation: No  Additional sampling recommendations: Not applicable Preliminary assessment of sample adequacy: Not applicable Needle removal The biopsy needle was removed and a sterile dressing was applied. Tract embolization: Gelfoam slurry Imaging following biopsy Immediate post-biopsy imaging was performed. Imaging modality: noncontrast CT. Post-biopsy imaging findings: No evidence of hemorrhage. Gas from the Gelfoam administration follows the needle path. Contrast Contrast agent: None Contrast volume (mL): 0 Radiation Dose CT dose length product (mGy-cm):  1010.72 Additional Details Additional description of procedure: None Equipment details: None Specimens removed: Pathology Estimated blood loss (mL): Less than 10 Standardized report: [email or web address] Attestation Signer name: Georgiana Douglas M.D. I attest that I personally performed the entire procedure. I reviewed the stored images and agree with the report as written. Echocardiogram results:  No results found for this visit on 12/23/20.     Procedures done this admission:  * No surgery found *    All Micro Results     Procedure Component Value Units Date/Time    CULTURE, URINE [[id number]] Collected: 12/24/20 0016    Order Status: Completed Specimen: Cath Urine Updated: 12/26/20 0719     Special Requests: NO SPECIAL REQUESTS        Culture result:       >100,000 COLONIES/mL MIXED SKIN AZEEM ISOLATED                  THREE OR MORE TYPES OF ORGANISMS ARE PRESENT. THIS IS INDICATIVE OF CONTAMINATION DUE TO IMPROPER COLLECTION TECHNIQUE. PLEASE REPEAT COLLECTION UNLESS PATIENT HAS STARTED ANTIBIOTIC TREATMENT.                 Labs: Results:       BMP, Mg, Phos Recent Labs     01/11/21  0624 01/10/21  0539 01/09/21  0551   * 137 139   K 4.6 3.9 4.2    103 106   CO2 26 28 26   AGAP 6* 6* 7   BUN 27* 20 26*   CREA 6.23* 5.28* 7.19*   CA 9.8 9.0 9.0   * 121* 126*   PHOS 3.5 3.5 5.0*      CBC Recent Labs     01/11/21  0624 01/10/21  0539 01/09/21  0551   WBC 7.1 6.3 6.0   RBC 3.99* 3.83* 3.72*   HGB 10.0* 9.7* 9.4*   HCT 32.0* 31.6* 30.9*   * 60* 115*   GRANS 79* 49 54   LYMPH 16 33 30   EOS 0* 5 3   MONOS 3* 12 11   BASOS 1 1 2   IG 1 1 0   ANEU 5.6 3.1 3.3   ABL 1.1 2.1 1.8   FRANC 0.0 0.3 0.2   ABM 0.2 0.7 0.7   ABB 0.1 0.1 0.1   AIG 0.1 0.0 0.0      LFT Recent Labs     01/11/21  0624 01/10/21  0539 01/09/21  0551   ALT 14 13 13    114 119   TP 8.1 7.3 6.7   ALB 2.3* 2.1* 2.2*   GLOB 5.8* 5.2* 4.5*   AGRAT 0.4* 0.4* 0.5*      Cardiac Testing Lab Results   Component Value Date/Time     (H) 12/24/2020 12:20 AM    CK - MB 4.3 (H) 12/24/2020 12:20 AM    CK-MB Index 0.8 12/24/2020 12:20 AM      Coagulation Tests Lab Results   Component Value Date/Time    Prothrombin time 13.0 12/28/2020 01:10 PM    INR 1.0 12/28/2020 01:10 PM    aPTT 37.2 (H) 12/28/2020 01:10 PM      A1c Lab Results   Component Value Date/Time    Hemoglobin A1c 10.2 (H) 12/26/2020 05:50 AM      Lipid Panel No results found for: CHOL, CHOLPOCT, CHOLX, CHLST, CHOLV, 962582, HDL, HDLP, LDL, LDLC, DLDLP, 620634, VLDLC, VLDL, TGLX, TRIGL, TRIGP, TGLPOCT, [facility], [facility]   Thyroid Panel Lab Results   Component Value Date/Time    TSH 0.843 12/24/2020 06:44 AM        Most Recent UA Lab Results   Component Value Date/Time    Color ORANGE 12/24/2020 12:16 AM    Appearance HAZY 12/24/2020 12:16 AM    pH (UA) 5.5 12/24/2020 12:16 AM    Protein >300 (A) 12/24/2020 12:16 AM    Glucose 100 (A) 12/24/2020 12:16 AM    Ketone Negative 12/24/2020 12:16 AM    Bilirubin SMALL (A) 12/24/2020 12:16 AM    Blood LARGE (A) 12/24/2020 12:16 AM    Urobilinogen 0.2 12/24/2020 12:16 AM    Nitrites Negative 12/24/2020 12:16 AM    Leukocyte Esterase Negative 12/24/2020 12:16 AM    WBC 5-10 12/24/2020 12:16 AM    RBC >100 12/24/2020 12:16 AM    Epithelial cells 0-3 12/24/2020 12:16 AM    Bacteria 1+ (H) 12/24/2020 12:16 AM        No Known Allergies  Immunization History   Administered Date(s) Administered    TB Skin Test (PPD) Intradermal 12/31/2020       All Labs from Last 24 Hrs:  Recent Results (from the past 24 hour(s))   GLUCOSE, POC    Collection Time: 01/10/21  8:43 PM   Result Value Ref Range    Glucose (POC) 119 (H) 65 - 100 mg/dL   CBC WITH AUTOMATED DIFF    Collection Time: 01/11/21  6:24 AM   Result Value Ref Range    WBC 7.1 4.3 - 11.1 K/uL    RBC 3.99 (L) 4.05 - 5.2 M/uL    HGB 10.0 (L) 11.7 - 15.4 g/dL    HCT 32.0 (L) 35.8 - 46.3 %    MCV 80.2 79.6 - 97.8 FL    MCH 25.1 (L) 26.1 - 32.9 PG    MCHC 31.3 (L) 31.4 - 35.0 g/dL    RDW 13.6 11.9 - 14.6 %    PLATELET 035 (L) 560 - 450 K/uL    MPV 10.4 9.4 - 12.3 FL    ABSOLUTE NRBC 0.00 0.0 - 0.2 K/uL    NEUTROPHILS 79 (H) 43 - 78 %    LYMPHOCYTES 16 13 - 44 %    MONOCYTES 3 (L) 4.0 - 12.0 %    EOSINOPHILS 0 (L) 0.5 - 7.8 %    BASOPHILS 1 0.0 - 2.0 %    IMMATURE GRANULOCYTES 1 0.0 - 5.0 %    ABS. NEUTROPHILS 5.6 1.7 - 8.2 K/UL    ABS. LYMPHOCYTES 1.1 0.5 - 4.6 K/UL    ABS. MONOCYTES 0.2 0.1 - 1.3 K/UL    ABS. EOSINOPHILS 0.0 0.0 - 0.8 K/UL    ABS. BASOPHILS 0.1 0.0 - 0.2 K/UL    ABS. IMM. [place]. 0.1 0.0 - 0.5 K/UL    DF AUTOMATED     METABOLIC PANEL, COMPREHENSIVE    Collection Time: 01/11/21  6:24 AM   Result Value Ref Range    Sodium 135 (L) 136 - 145 mmol/L    Potassium 4.6 3.5 - 5.1 mmol/L    Chloride 103 98 - 107 mmol/L    CO2 26 21 - 32 mmol/L    Anion gap 6 (L) 7 - 16 mmol/L    Glucose 149 (H) 65 - 100 mg/dL    BUN 27 (H) 6 - 23 MG/DL    Creatinine 6.23 (H) 0.6 - 1.0 MG/DL    GFR est AA 10 (L) >60 ml/min/1.73m2    GFR est non-AA 8 (L) >60 ml/min/1.73m2    Calcium 9.8 8.3 - 10.4 MG/DL    Bilirubin, total 0.4 0.2 - 1.1 MG/DL    ALT (SGPT) 14 12 - 65 U/L    AST (SGOT) 13 (L) 15 - 37 U/L    Alk.  phosphatase 128 50 - 136 U/L    Protein, total 8.1 6.3 - 8.2 g/dL    Albumin 2.3 (L) 3.5 - 5.0 g/dL    Globulin 5.8 (H) 2.3 - 3.5 g/dL    A-G Ratio 0.4 (L) 1.2 - 3.5     PHOSPHORUS    Collection Time: 01/11/21  6:24 AM   Result Value Ref Range    Phosphorus 3.5 2.5 - 4.5 MG/DL   GLUCOSE, POC    Collection Time: 01/11/21  7:17 AM   Result Value Ref Range    Glucose (POC) 162 (H) 65 - 100 mg/dL   GLUCOSE, POC    Collection Time: 01/11/21 10:44 AM   Result Value Ref Range    Glucose (POC) 152 (H) 65 - 100 mg/dL   SARS-COV-2    Collection Time: 01/11/21 11:08 AM   Result Value Ref Range    Specimen source Nasopharyngeal      COVID-19 rapid test Not detected NOTD      SARS CoV-2 PENDING    GLUCOSE, POC    Collection Time: 01/11/21  3:43 PM   Result Value Ref Range    Glucose (POC) 131 (H) 65 - 100 mg/dL       Current Med List in Hospital:   Current Facility-Administered Medications   Medication Dose Route Frequency    predniSONE (DELTASONE) tablet 60 mg  60 mg Oral DAILY WITH BREAKFAST    labetaloL (NORMODYNE) tablet 400 mg  400 mg Oral TID    amLODIPine (NORVASC) tablet 5 mg  5 mg Oral BID    hydrALAZINE (APRESOLINE) tablet 50 mg  50 mg Oral Q6H PRN    senna-docusate (PERICOLACE) 8.6-50 mg per tablet 1 Tab  1 Tab Oral DAILY PRN    heparin (porcine) 1,000 unit/mL injection 5,000 Units  5,000 Units Hemodialysis DIALYSIS PRN    epoetin marianne-epbx (RETACRIT) injection 10,000 Units  10,000 Units SubCUTAneous Q7D    diphenhydrAMINE (BENADRYL) injection 50 mg  50 mg IntraVENous Multiple    HYDROcodone-acetaminophen (NORCO)  mg tablet 1 Tab  1 Tab Oral Q6H PRN    labetaloL (NORMODYNE;TRANDATE) injection 20 mg  20 mg IntraVENous Q1H PRN    cloNIDine HCL (CATAPRES) tablet 0.2 mg  0.2 mg Oral BID    sevelamer carbonate (RENVELA) tab 800 mg  800 mg Oral TID WITH MEALS    famotidine (PEPCID) tablet 20 mg  20 mg Oral DAILY    insulin lispro (HUMALOG) injection 0-10 Units  0-10 Units SubCUTAneous AC&HS    lip protectant (BLISTEX) ointment 1 Each  1 Each Topical PRN    alum-mag hydroxide-simeth (MYLANTA) oral suspension 30 mL  30 mL Oral Q4H PRN    heparin (porcine) injection 5,000 Units  5,000 Units SubCUTAneous Q8H    sodium chloride (NS) flush 5-40 mL  5-40 mL IntraVENous Q8H    sodium chloride (NS) flush 5-40 mL  5-40 mL IntraVENous PRN    acetaminophen (TYLENOL) tablet 650 mg  650 mg Oral Q6H PRN    Or    acetaminophen (TYLENOL) suppository 650 mg  650 mg Rectal Q6H PRN    polyethylene glycol (MIRALAX) packet 17 g  17 g Oral DAILY PRN    promethazine (PHENERGAN) tablet 12.5 mg  12.5 mg Oral Q6H PRN     Current Outpatient Medications   Medication Sig    amLODIPine (NORVASC) 10 mg tablet Take 1 Tab by mouth daily.  cloNIDine HCL (CATAPRES) 0.2 mg tablet Take 1 Tab by mouth two (2) times a day.  sevelamer carbonate (RENVELA) 800 mg tab tab Take 1 Tab by mouth three (3) times daily (with meals).  labetaloL (NORMODYNE) 200 mg tablet Take 2 Tabs by mouth three (3) times daily.     insulin lispro (HUMALOG) 100 unit/mL kwikpen Less than 150 =   0 units           150 -199 =   2 units  200 -249 =   4 units  250 -299 =   6 units  300 -349 =   8 units  350 and above = 10 units        Discharge Exam:  Patient Vitals for the past 24 hrs:   Temp Pulse Resp BP SpO2   01/11/21 1544 97.7 °F (36.5 °C) 80 18 127/81 99 %   01/11/21 1045 98.2 °F (36.8 °C) 87 20 130/75 98 %   01/11/21 0715 97.7 °F (36.5 °C) 84 20 (!) 167/77 99 %   01/11/21 0359 97.8 °F (36.6 °C) 84 18 (!) 169/64 96 %   01/11/21 0014 97.6 °F (36.4 °C) 75 18 (!) 142/91 97 %   01/10/21 1951 98.1 °F (36.7 °C) 75 17 (!) 143/87 99 %     Oxygen Therapy  O2 Sat (%): 99 % (01/11/21 1544)  Pulse via Oximetry: 84 beats per minute (12/29/20 1133)  O2 Device: Room air (01/05/21 1155)  O2 Flow Rate (L/min): 3 l/min (12/30/20 0840)  ETCO2 (mmHg): 42 mmHg (12/29/20 1054)    Estimated body mass index is 45.26 kg/m² as calculated from the following:    Height as of this encounter: 5' 5\" (1.651 m). Weight as of this encounter: 123.4 kg (272 lb). No intake or output data in the 24 hours ending 01/11/21 1923    *Note that automatically entered I/Os may not be accurate; dependent on patient compliance with collection and accurate  by assistants. PE:  GEN - in no distress, alert and oriented  CV - regular rate. S1, S2, no murmur, no rub  Lung - clear bilaterally  Abd - soft, nontender  Ext - no edema  Access: right TCC- intact t. Discharge Info:   Discharge Medication List as of 1/11/2021  6:46 PM      START taking these medications    Details   amLODIPine (NORVASC) 10 mg tablet Take 1 Tab by mouth daily. , Normal, Disp-30 Tab, R-1      cloNIDine HCL (CATAPRES) 0.2 mg tablet Take 1 Tab by mouth two (2) times a day., Normal, Disp-60 Tab, R-1      sevelamer carbonate (RENVELA) 800 mg tab tab Take 1 Tab by mouth three (3) times daily (with meals). , Normal, Disp-90 Tab, R-1      labetaloL (NORMODYNE) 200 mg tablet Take 2 Tabs by mouth three (3) times daily. , Normal, Disp-180 Tab, R-0      insulin lispro (HUMALOG) 100 unit/mL kwikpen Less than 150 =   0 units           150 -199 =   2 units  200 -249 =   4 units  250 -299 =   6 units  300 -349 =   8 units  350 and above = 10 units , Print, Disp-1 Package, R-1             Follow Up Orders:   Follow-up Appointments   Procedures    FOLLOW UP VISIT Appointment in: One Week Follow up with nephrology on Wednesday     Follow up with nephrology on Wednesday     Standing Status:   Standing     Number of Occurrences:   1     Standing Expiration Date:   1/12/2021     Order Specific Question:   Appointment in     Answer: One Week       Follow-up Information     Follow up With Specialties Details Why Sebastian Barba  [facility] dialysis slot at 1:30pm.  On 1/13/2021, please arrive at 1:15pm to complete paperwork. [address] [facility] [id number]  [phone]    Other, MD Shayna    Patient can only remember the practice name and not the physician            Time spent in patient discharge planning and coordination 35 minutes.     Signed:  Apurva Yang MD

## 2021-01-12 NOTE — PROGRESS NOTES
Hospitalist Note     Admit Date:  2020 10:57 PM   Name:  Garcia Lyn   Age:  37 y.o.  :  1985   MRN:  [id number]   PCP:  Jerod Kirkpatrick MD  Treatment Team: Consulting Provider: Janis Casey NP; Utilization Review: Merly Rene RN; Tech: Caren Jones; Utilization Review: Johana Dumont; Care Manager: Jose Valenzuela LMSW; Medical Assistant: Kelsey Perrin RN; Utilization Review: Hema Nieto Charge Nurse: Hillary Epley Utilization Review: Vernon Fuentes    HPI/Subjective:       Ms. Ginger Robles is a 36 yo female with PMH of HTN, DM2, admitted with JOSSELIN. She has been seen by nephrology and required hemodialysis that likely will be needed longterm. TCC placed per IR. Completed course of empiric rocephin 7 days, urine cx skin jae. She is s/p renal biopsy path showing \"inflammation, collapsing glomerulopathy and hypertensive changes\", per nephrology she is not quite end stage disease but high risk. She is pending outpatient HD slot. Discharge is expected to home with mother. 23 ready to go home but still needs slot, some anorexia, no BM change, no dyspnea     1/3/21 patient sitting up in chair this morning. Feeling well. Currently awaiting outpatient hemodialysis slot. No chest pain no palpitations.   No acute events overnight. Patient clinically stable. Still awaiting chair placement for dialysis. BP elevated    Objective:     Patient Vitals for the past 24 hrs:   Temp Pulse Resp BP SpO2   21 1544 97.7 °F (36.5 °C) 80 18 127/81 99 %   21 1045 98.2 °F (36.8 °C) 87 20 130/75 98 %   21 0715 97.7 °F (36.5 °C) 84 20 (!) 167/77 99 %   21 0359 97.8 °F (36.6 °C) 84 18 (!) 169/64 96 %   21 0014 97.6 °F (36.4 °C) 75 18 (!) 142/91 97 %   01/10/21 1951 98.1 °F (36.7 °C) 75 17 (!) 143/87 99 %     Oxygen Therapy  O2 Sat (%): 99 % (21 1544)  Pulse via Oximetry: 84 beats per minute (20 1133)  O2 Device: Room air (01/05/21 1155)  O2 Flow Rate (L/min): 3 l/min (12/30/20 0840)  ETCO2 (mmHg): 42 mmHg (12/29/20 1054)    Estimated body mass index is 45.26 kg/m² as calculated from the following:    Height as of this encounter: 5' 5\" (1.651 m). Weight as of this encounter: 123.4 kg (272 lb). No intake or output data in the 24 hours ending 01/11/21 1922    *Note that automatically entered I/Os may not be accurate; dependent on patient compliance with collection and accurate  by techs. General:    Well nourished. Alert. No distress, obese  CV:   RRR. No murmur, rub, or gallop. No edema   Lungs:   CTAB. No wheezing, rhonchi, or rales. Abdomen:   Soft, nontender, nondistended. Obese, present BS  Extremities: Warm and dry. Skin:     No rashes or jaundice. Neuro:  No gross focal deficits    Data Review:  I have reviewed all labs, meds, and studies from the last 24 hours:  Recent Results (from the past 24 hour(s))   GLUCOSE, POC    Collection Time: 01/10/21  8:43 PM   Result Value Ref Range    Glucose (POC) 119 (H) 65 - 100 mg/dL   CBC WITH AUTOMATED DIFF    Collection Time: 01/11/21  6:24 AM   Result Value Ref Range    WBC 7.1 4.3 - 11.1 K/uL    RBC 3.99 (L) 4.05 - 5.2 M/uL    HGB 10.0 (L) 11.7 - 15.4 g/dL    HCT 32.0 (L) 35.8 - 46.3 %    MCV 80.2 79.6 - 97.8 FL    MCH 25.1 (L) 26.1 - 32.9 PG    MCHC 31.3 (L) 31.4 - 35.0 g/dL    RDW 13.6 11.9 - 14.6 %    PLATELET 315 (L) 372 - 450 K/uL    MPV 10.4 9.4 - 12.3 FL    ABSOLUTE NRBC 0.00 0.0 - 0.2 K/uL    NEUTROPHILS 79 (H) 43 - 78 %    LYMPHOCYTES 16 13 - 44 %    MONOCYTES 3 (L) 4.0 - 12.0 %    EOSINOPHILS 0 (L) 0.5 - 7.8 %    BASOPHILS 1 0.0 - 2.0 %    IMMATURE GRANULOCYTES 1 0.0 - 5.0 %    ABS. NEUTROPHILS 5.6 1.7 - 8.2 K/UL    ABS. LYMPHOCYTES 1.1 0.5 - 4.6 K/UL    ABS. MONOCYTES 0.2 0.1 - 1.3 K/UL    ABS. EOSINOPHILS 0.0 0.0 - 0.8 K/UL    ABS. BASOPHILS 0.1 0.0 - 0.2 K/UL    ABS. IMM.  GRANS. 0.1 0.0 - 0.5 K/UL    DF AUTOMATED     METABOLIC PANEL, COMPREHENSIVE    Collection Time: 01/11/21  6:24 AM   Result Value Ref Range    Sodium 135 (L) 136 - 145 mmol/L    Potassium 4.6 3.5 - 5.1 mmol/L    Chloride 103 98 - 107 mmol/L    CO2 26 21 - 32 mmol/L    Anion gap 6 (L) 7 - 16 mmol/L    Glucose 149 (H) 65 - 100 mg/dL    BUN 27 (H) 6 - 23 MG/DL    Creatinine 6.23 (H) 0.6 - 1.0 MG/DL    GFR est AA 10 (L) >60 ml/min/1.73m2    GFR est non-AA 8 (L) >60 ml/min/1.73m2    Calcium 9.8 8.3 - 10.4 MG/DL    Bilirubin, total 0.4 0.2 - 1.1 MG/DL    ALT (SGPT) 14 12 - 65 U/L    AST (SGOT) 13 (L) 15 - 37 U/L    Alk.  phosphatase 128 50 - 136 U/L    Protein, total 8.1 6.3 - 8.2 g/dL    Albumin 2.3 (L) 3.5 - 5.0 g/dL    Globulin 5.8 (H) 2.3 - 3.5 g/dL    A-G Ratio 0.4 (L) 1.2 - 3.5     PHOSPHORUS    Collection Time: 01/11/21  6:24 AM   Result Value Ref Range    Phosphorus 3.5 2.5 - 4.5 MG/DL   GLUCOSE, POC    Collection Time: 01/11/21  7:17 AM   Result Value Ref Range    Glucose (POC) 162 (H) 65 - 100 mg/dL   GLUCOSE, POC    Collection Time: 01/11/21 10:44 AM   Result Value Ref Range    Glucose (POC) 152 (H) 65 - 100 mg/dL   SARS-COV-2    Collection Time: 01/11/21 11:08 AM   Result Value Ref Range    Specimen source Nasopharyngeal      COVID-19 rapid test Not detected NOTD      SARS CoV-2 PENDING    GLUCOSE, POC    Collection Time: 01/11/21  3:43 PM   Result Value Ref Range    Glucose (POC) 131 (H) 65 - 100 mg/dL        Current Meds:  Current Facility-Administered Medications   Medication Dose Route Frequency    predniSONE (DELTASONE) tablet 60 mg  60 mg Oral DAILY WITH BREAKFAST    labetaloL (NORMODYNE) tablet 400 mg  400 mg Oral TID    amLODIPine (NORVASC) tablet 5 mg  5 mg Oral BID    hydrALAZINE (APRESOLINE) tablet 50 mg  50 mg Oral Q6H PRN    senna-docusate (PERICOLACE) 8.6-50 mg per tablet 1 Tab  1 Tab Oral DAILY PRN    heparin (porcine) 1,000 unit/mL injection 5,000 Units  5,000 Units Hemodialysis DIALYSIS PRN    epoetin marianne-epbx (RETACRIT) injection 10,000 Units 10,000 Units SubCUTAneous Q7D    diphenhydrAMINE (BENADRYL) injection 50 mg  50 mg IntraVENous Multiple    HYDROcodone-acetaminophen (NORCO)  mg tablet 1 Tab  1 Tab Oral Q6H PRN    labetaloL (NORMODYNE;TRANDATE) injection 20 mg  20 mg IntraVENous Q1H PRN    cloNIDine HCL (CATAPRES) tablet 0.2 mg  0.2 mg Oral BID    sevelamer carbonate (RENVELA) tab 800 mg  800 mg Oral TID WITH MEALS    famotidine (PEPCID) tablet 20 mg  20 mg Oral DAILY    insulin lispro (HUMALOG) injection 0-10 Units  0-10 Units SubCUTAneous AC&HS    lip protectant (BLISTEX) ointment 1 Each  1 Each Topical PRN    alum-mag hydroxide-simeth (MYLANTA) oral suspension 30 mL  30 mL Oral Q4H PRN    heparin (porcine) injection 5,000 Units  5,000 Units SubCUTAneous Q8H    sodium chloride (NS) flush 5-40 mL  5-40 mL IntraVENous Q8H    sodium chloride (NS) flush 5-40 mL  5-40 mL IntraVENous PRN    acetaminophen (TYLENOL) tablet 650 mg  650 mg Oral Q6H PRN    Or    acetaminophen (TYLENOL) suppository 650 mg  650 mg Rectal Q6H PRN    polyethylene glycol (MIRALAX) packet 17 g  17 g Oral DAILY PRN    promethazine (PHENERGAN) tablet 12.5 mg  12.5 mg Oral Q6H PRN     Current Outpatient Medications   Medication Sig    amLODIPine (NORVASC) 10 mg tablet Take 1 Tab by mouth daily.  cloNIDine HCL (CATAPRES) 0.2 mg tablet Take 1 Tab by mouth two (2) times a day.  sevelamer carbonate (RENVELA) 800 mg tab tab Take 1 Tab by mouth three (3) times daily (with meals).  labetaloL (NORMODYNE) 200 mg tablet Take 2 Tabs by mouth three (3) times daily.  insulin lispro (HUMALOG) 100 unit/mL kwikpen Less than 150 =   0 units           150 -199 =   2 units  200 -249 =   4 units  250 -299 =   6 units  300 -349 =   8 units  350 and above = 10 units        Other Studies:  No results found for this visit on 12/23/20. No results found.     All Micro Results     Procedure Component Value Units Date/Time    CULTURE, URINE [[id number]] Collected: 12/24/20 0016    Order Status: Completed Specimen: Cath Urine Updated: 12/26/20 0719     Special Requests: NO SPECIAL REQUESTS        Culture result:       >100,000 COLONIES/mL MIXED SKIN AZEEM ISOLATED                  THREE OR MORE TYPES OF ORGANISMS ARE PRESENT. THIS IS INDICATIVE OF CONTAMINATION DUE TO IMPROPER COLLECTION TECHNIQUE. PLEASE REPEAT COLLECTION UNLESS PATIENT HAS STARTED ANTIBIOTIC TREATMENT. SARS-CoV-2 Lab Results  \"Novel Coronavirus\" Test: No results found for: COV2NT   \"Emergent Disease\" Test: No results found for: EDPR  \"SARS-COV-2\" Test: No results found for: XGCOVT  Rapid Test:   Lab Results   Component Value Date/Time    COVR Not detected 01/11/2021 11:08 AM            Assessment and Plan:     Hospital Problems as of 1/11/2021 Never Reviewed          Codes Class Noted - Resolved POA    * (Principal) Acute renal failure on dialysis [facility]) ICD-10-CM: N17.9, Z99.2  ICD-9-CM: 584.9, V45.11  12/31/2020 - Present Yes        Acute renal failure (ARF) ([facility] [address] ICD-10-CM: N17.9  ICD-9-CM: 584.9  12/24/2020 - Present Unknown        Elevated procalcitonin ICD-10-CM: R79.89  ICD-9-CM: 790.99  12/24/2020 - Present Unknown        Obesity ICD-10-CM: E66.9  ICD-9-CM: 278.00  12/24/2020 - Present Unknown        Hx of essential hypertension ICD-10-CM: Z86.79  ICD-9-CM: V12.59  12/24/2020 - Present Unknown        Microcytic anemia ICD-10-CM: D50.9  ICD-9-CM: 280.9  12/24/2020 - Present Unknown              Plan:      · New onset JOSSELIN progressed to CKD, high risk for ESRD-  on new hemodialysis:  · can discharge once has HD slot  · TCC in place   · Completed course of empiric rocephin 7 days      · HTN:  · Continued norvasc, clonidine, labetalol. Increase labetalol to 400mg tid      · DM2:  · On SSI  · DM2 education  · Minimal insulin use since admission. A1c is likely falsely elevated secondary to ESRD  · Needs Rx for glucometer/strips  · Check fructosamine      DC planning/Dispo:   To home once she has outpatient hemodialysis slot     Diet:  DIET RENAL  DVT ppx:  heparin    Signed:  Cliff Vieyra MD

## 2021-01-14 RX ORDER — INSULIN PUMP SYRINGE, 3 ML
EACH MISCELLANEOUS
Qty: 1 KIT | Refills: 0 | Status: SHIPPED | OUTPATIENT
Start: 2021-01-14

## 2021-01-28 NOTE — ADT AUTH CERT NOTES
Renal Failure, Acute - Care Day 17 (1/9/2021) by Tabby Arroyo       Review Entered Review Status   1/11/2021 14:56 Completed      Criteria Review      Care Day: 17 Care Date: 1/9/2021 Level of Care: Telemetry    Guideline Day 4    Level Of Care    (X) Floor to discharge    Clinical Status    (X) * Hemodynamic stability    1/11/2021 14:56:44 EST by Tabby Arroyo      169/86- 78- 18- 93% RA 98.5-    (X) * Mental status at baseline    1/11/2021 14:56:44 EST by Emerald Banks      AAOx3    (X) * Tachypnea absent    1/11/2021 14:56:44 EST by Tabby Arroyo      18    (X) * Hypoxemia absent    1/11/2021 14:56:44 EST by Tabby Arroyo      93% RA    (X) * Etiology requiring inpatient treatment absent    1/11/2021 14:56:44 EST by Tabby Aroryo      needs chair time established.     (X) * Electrolyte abnormalities absent or acceptable for next level of care    1/11/2021 14:56:44 EST by Tabby Dina      electrolytes WNL  * creat  7.19    (X) * Acid-base abnormalities absent    1/11/2021 14:56:44 EST by Tabby Dina      co2 26. blood sugars 126    (X) * Volume status at baseline or acceptable for next level of care    1/11/2021 14:56:44 EST by Tabby Arroyo      volume status acceptable    (X) * Diet tolerated    1/11/2021 14:56:44 EST by Tabby Arroyo      tolerating renal/consitent carb diet    (X) * Dialysis not needed or access and plan established    1/11/2021 14:56:44 EST by [employer] ([facility])      needs chair time established    ( ) * Discharge plans and education understood    Activity    (X) * Ambulatory or acceptable for next level of care [J]    1/11/2021 14:56:44 EST by Tabby Dina      ad yaritza/as memo    Routes    (X) * Oral hydration, medications, and diet    1/11/2021 14:56:44 EST by [employer] ([facility])      Meds labetalol 400 mg tid po, Tylenol 650 mg q 6 hr prn po x 1, Norvasc 5 mg bid po, catapres 0.2mg bid po, Pepcid 20 mg qd po, heparin 5000U q 8 hr, ISS,  renvela 800mg tid po    Interventions    (X) Possible dialysis    1/11/2021 14:56:44 EST by Christa Shankar is on HD    * Milestone   Additional Notes   1/9/21   Nephrology note   HPI: Pt seen and examined, on HD tolerating it well no new complaints.        ROS:   General: no fever/chills, appetite ok   CV: no CP   Lung: no SOB, no cough   GI: no N/V/D   Ext: no edema    VS:  169/86- 78- 18- 93% RA 98.5-       Meds labetalol 400 mg tid po, Tylenol 650 mg q 6 hr prn po x 1, Norvasc 5 mg bid po, catapres 0.2mg bid po, Pepcid 20 mg qd po, heparin 5000U q 8 hr, ISS,  renvela 800mg tid po,           Ref. Range 1/9/2021 05:51   WBC Latest Ref Range: 4.3 - 11.1 K/uL 6.0   RBC Latest Ref Range: 4.05 - 5.2 M/uL 3.72 (L)   HGB Latest Ref Range: 11.7 - 15.4 g/dL 9.4 (L)   HCT Latest Ref Range: 35.8 - 46.3 % 30.9 (L)   MCV Latest Ref Range: 79.6 - 97.8 FL 83.1   MCH Latest Ref Range: 26.1 - 32.9 PG 25.3 (L)   MCHC Latest Ref Range: 31.4 - 35.0 g/dL 30.4 (L)   RDW Latest Ref Range: 11.9 - 14.6 % 13.7   PLATELET Latest Ref Range: 150 - 450 K/uL 115 (L)   MPV Latest Ref Range: 9.4 - 12.3 FL 10.0   NEUTROPHILS Latest Ref Range: 43 - 78 % 54   LYMPHOCYTES Latest Ref Range: 13 - 44 % 30   MONOCYTES Latest Ref Range: 4.0 - 12.0 % 11   EOSINOPHILS Latest Ref Range: 0.5 - 7.8 % 3   BASOPHILS Latest Ref Range: 0.0 - 2.0 % 2   IMMATURE GRANULOCYTES Latest Ref Range: 0.0 - 5.0 % 0   DF Latest Units:   AUTOMATED   ABSOLUTE NRBC Latest Ref Range: 0.0 - 0.2 K/uL 0.00   ABS. NEUTROPHILS Latest Ref Range: 1.7 - 8.2 K/UL 3.3   ABS. IMM. GRANS. Latest Ref Range: 0.0 - 0.5 K/UL 0.0   ABS. LYMPHOCYTES Latest Ref Range: 0.5 - 4.6 K/UL 1.8   ABS. MONOCYTES Latest Ref Range: 0.1 - 1.3 K/UL 0.7   ABS. EOSINOPHILS Latest Ref Range: 0.0 - 0.8 K/UL 0.2   ABS.  BASOPHILS Latest Ref Range: 0.0 - 0.2 K/UL 0.1   Sodium Latest Ref Range: 136 - 145 mmol/L 139   Potassium Latest Ref Range: 3.5 - 5.1 mmol/L 4.2   Chloride Latest Ref Range: 98 - 107 mmol/L 106   CO2 Latest Ref Range: 21 - 32 mmol/L 26 Anion gap Latest Ref Range: 7 - 16 mmol/L 7   Glucose Latest Ref Range: 65 - 100 mg/dL 126 (H)   BUN Latest Ref Range: 6 - 23 MG/DL 26 (H)   Creatinine Latest Ref Range: 0.6 - 1.0 MG/DL 7.19 (H)   Calcium Latest Ref Range: 8.3 - 10.4 MG/DL 9.0   Phosphorus Latest Ref Range: 2.5 - 4.5 MG/DL 5.0 (H)   GFR est non-AA Latest Ref Range: >60 ml/min/1.73m2 7 (L)   GFR est AA Latest Ref Range: >60 ml/min/1.73m2 8 (L)   Bilirubin, total Latest Ref Range: 0.2 - 1.1 MG/DL 0.4   Protein, total Latest Ref Range: 6.3 - 8.2 g/dL 6.7   Albumin Latest Ref Range: 3.5 - 5.0 g/dL 2.2 (L)   Globulin Latest Ref Range: 2.3 - 3.5 g/dL 4.5 (H)   A-G Ratio Latest Ref Range: 1.2 - 3.5   0.5 (L)   ALT Latest Ref Range: 12 - 65 U/L 13   AST Latest Ref Range: 15 - 37 U/L 14 (L)   Alk.  phosphatase Latest Ref Range: 50 - 136 U/L 119   FRUCTOSAMINE Unknown Rpt            Renal Failure, Acute - Care Day 16 (1/8/2021) by Tobias Hutchins       Review Entered Review Status   1/11/2021 14:52 Completed      Criteria Review      Care Day: 16 Care Date: 1/8/2021 Level of Care: Telemetry    Guideline Day 4    Level Of Care    (X) Floor to discharge    Clinical Status    (X) * Hemodynamic stability    1/11/2021 14:51:59 EST by Tobias Hutchins      VS:  143/88- 18-  68- 97.8- 100%    (X) * Mental status at baseline    (X) * Tachypnea absent    1/11/2021 14:51:59 EST by Tobias Hutchins      18    (X) * Hypoxemia absent    1/11/2021 14:51:59 EST by Tobias Hutchins      no hypoxia  93%RA    (X) * Etiology requiring inpatient treatment absent    1/11/2021 14:51:59 EST by Tobias Hutchins      etiology requiring IP tx absent, but needs establishing in a HD clinic    (X) * Electrolyte abnormalities absent or acceptable for next level of care    1/11/2021 14:51:59 EST by Tobias Hutchins      Na  137-  K  4.1 - Creat 6.26    (X) * Acid-base abnormalities absent    1/11/2021 14:51:59 EST by Gaby Grewalsy      co2 26, K 4.1- labs look relatiely WNL    (X) * Volume status at baseline or acceptable for next level of care    1/11/2021 14:51:59 EST by Tabby Arroyo      volume status at [facility]    (X) * Diet tolerated    1/11/2021 14:52:18 EST by Tabby Arroyo      tolerating diet    (X) * Dialysis not needed or access and plan established    1/11/2021 14:51:59 EST by Tabby Arroyo      plan needs established    ( ) * Discharge plans and education understood    Activity    (X) * Ambulatory or acceptable for next level of care [J]    1/11/2021 14:51:59 EST by Tabby Arroyo      as memo/ad yaritza    Routes    (X) * Oral hydration, medications, and diet    1/11/2021 14:51:59 EST by Tabby Arroyo      Meds labetalol 400 mg tid po, Tylenol 650 mg q 6 hr prn po x 1, Norvasc 5 mg bid po, catapres 0.2mg bid po, Pepcid 20 mg qd po, heparin 5000U q 8 hr, ISS,  renvela 800mg tid po,    (X) Renal diet as tolerated    1/11/2021 14:51:59 EST by Tabby Arroyo      renal diet. consitent carb [id number] Kcal    Interventions    (X) Possible dialysis    1/11/2021 14:51:59 EST by Mamadou Chamorro is on HD: needs to get chair time established    (X) Possibly establish long-term access for dialysis    1/11/2021 14:51:59 EST by Jareth Lamb term HD access has been established : [facility] in [facility]    (X) Monitor electrolytes, renal function tests, acid-base, and volume status    1/11/2021 14:51:59 EST by Tabby Arroyo      monitoring of electrolytes, renal funciton and volume status: i/os    * Milestone   Additional Notes      1/8/21 Continued stay review      Internal Med note:    No acute events overnight.  Patient clinically stable.  Still awaiting chair placement for dialysis. BP elevated   VS:  143/88- 18-  68- 97.8- 100%      Nephrology note:   Plan:   1. JOSSELIN /ESRD- Pt continues to get education about dialysis and kidney failure and what to expect.  recommend she read up on dialysis and transplant at [email or web address].    2. S/p Renal bx c/w collapsing glomerulopathy FSGS; would try some high dose prednisone once pt agrees to the side-effects and risk benefit ratio   3. First dialysis 12/24   4. [address] placed 12/30/21   5. Disposition awaiting outpt slot under JOSSELIN   6. Anemia on alie    Meds labetalol 400 mg tid po, Tylenol 650 mg q 6 hr prn po x 1, Norvasc 5 mg bid po, catapres 0.2mg bid po, Pepcid 20 mg qd po, heparin 5000U q 8 hr, ISS,  renvela 800mg tid po,           Ref. Range 1/8/2021 04:55   WBC Latest Ref Range: 4.3 - 11.1 K/uL 6.7   RBC Latest Ref Range: 4.05 - 5.2 M/uL 3.49 (L)   HGB Latest Ref Range: 11.7 - 15.4 g/dL 8.8 (L)   HCT Latest Ref Range: 35.8 - 46.3 % 28.9 (L)   MCV Latest Ref Range: 79.6 - 97.8 FL 82.8   MCH Latest Ref Range: 26.1 - 32.9 PG 25.2 (L)   MCHC Latest Ref Range: 31.4 - 35.0 g/dL 30.4 (L)   RDW Latest Ref Range: 11.9 - 14.6 % 13.8   PLATELET Latest Ref Range: 150 - 450 K/uL 111 (L)   MPV Latest Ref Range: 9.4 - 12.3 FL 10.1   NEUTROPHILS Latest Ref Range: 43 - 78 % 56   LYMPHOCYTES Latest Ref Range: 13 - 44 % 28   MONOCYTES Latest Ref Range: 4.0 - 12.0 % 11   EOSINOPHILS Latest Ref Range: 0.5 - 7.8 % 3   BASOPHILS Latest Ref Range: 0.0 - 2.0 % 1   IMMATURE GRANULOCYTES Latest Ref Range: 0.0 - 5.0 % 1   DF Latest Units:   AUTOMATED   ABSOLUTE NRBC Latest Ref Range: 0.0 - 0.2 K/uL 0.00   ABS. NEUTROPHILS Latest Ref Range: 1.7 - 8.2 K/UL 3.7   ABS. IMM. GRANS. Latest Ref Range: 0.0 - 0.5 K/UL 0.1   ABS. LYMPHOCYTES Latest Ref Range: 0.5 - 4.6 K/UL 1.9   ABS. MONOCYTES Latest Ref Range: 0.1 - 1.3 K/UL 0.7   ABS. EOSINOPHILS Latest Ref Range: 0.0 - 0.8 K/UL 0.2   ABS.  BASOPHILS Latest Ref Range: 0.0 - 0.2 K/UL 0.1   RBC COMMENTS Latest Units:   NORMOCYTIC/NORMOCHROMIC   WBC COMMENTS Latest Units:   Result Confirmed By Smear   PLATELET COMMENTS Latest Units:   DECREASED   Sodium Latest Ref Range: 136 - 145 mmol/L 137   Potassium Latest Ref Range: 3.5 - 5.1 mmol/L 4.1   Chloride Latest Ref Range: 98 - 107 mmol/L 104   CO2 Latest Ref Range: 21 - 32 mmol/L 26   Anion gap Latest Ref Range: 7 - 16 mmol/L 7   Glucose Latest Ref Range: 65 - 100 mg/dL 132 (H)   BUN Latest Ref Range: 6 - 23 MG/DL 24 (H)   Creatinine Latest Ref Range: 0.6 - 1.0 MG/DL 6.26 (H)   Calcium Latest Ref Range: 8.3 - 10.4 MG/DL 9.1         Phosphorus Latest Ref Range: 2.5 - 4.5 MG/DL 4.2   GFR est non-AA Latest Ref Range: >60 ml/min/1.73m2 8 (L)   GFR est AA Latest Ref Range: >60 ml/min/1.73m2 10 (L)   Bilirubin, total Latest Ref Range: 0.2 - 1.1 MG/DL 0.3   Protein, total Latest Ref Range: 6.3 - 8.2 g/dL 7.2   Albumin Latest Ref Range: 3.5 - 5.0 g/dL 2.1 (L)   Globulin Latest Ref Range: 2.3 - 3.5 g/dL 5.1 (H)   A-G Ratio Latest Ref Range: 1.2 - 3.5   0.4 (L)   ALT Latest Ref Range: 12 - 65 U/L 12   AST Latest Ref Range: 15 - 37 U/L 14 (L)   Alk.  phosphatase Latest Ref Range: 50 - 136 U/L 104         Renal Failure, Acute - Care Day 15 (1/7/2021) by Miladis Gale       Review Entered Review Status   1/11/2021 14:43 Completed      Criteria Review      Care Day: 15 Care Date: 1/7/2021 Level of Care: Telemetry    Guideline Day 4    Level Of Care    (X) Floor to discharge    1/11/2021 14:43:14 EST by Miladis Gale      on tele    Clinical Status    (X) * Hemodynamic stability    1/11/2021 14:43:14 EST by Miladis Gale      VS:  143/88- 68- 18- 98.5-96% RA    (X) * Mental status at baseline    1/11/2021 14:43:14 EST by Miladis Gale      MS at baseline    (X) * Tachypnea absent    1/11/2021 14:43:14 EST by Miladis Gale      18    (X) * Hypoxemia absent    1/11/2021 14:43:15 EST by Miladis Gale      92% RA    (X) * Etiology requiring inpatient treatment absent    1/11/2021 14:43:15 EST by Miladis Gale      awaiting to get\"chair time\" established for HD    (X) * Electrolyte abnormalities absent or acceptable for next level of care    1/11/2021 14:43:15 EST by Emerald Wen  3.9- Na  136-    (X) * Acid-base abnormalities absent    1/11/2021 14:43:15 EST by Miladis Gale      no acid-base abnormalities    (X) * Volume status at baseline or acceptable for next level of care    1/11/2021 14:43:46 EST by Reno Harrison      volume status acceptable and being monitored i/o    (X) * Diet tolerated    1/11/2021 14:43:15 EST by Reno Harrison      no documented n/v memo diet    (X) * Dialysis not needed or access and plan established    1/11/2021 14:43:15 EST by Reno Harrison      awaiting to get HD schedule established    ( ) * Discharge plans and education understood    Activity    (X) * Ambulatory or acceptable for next level of care [J]    1/11/2021 14:43:15 EST by Reno Harrison      as tolerated  wth hob elevated    Routes    (X) * Oral hydration, medications, and diet    1/11/2021 14:43:15 EST by Reno Harrison      Meds: Tylenol 650 mg q 6 hr prn po x 1, Norvasc 5 mg bid po, catapres 0.2mg bid po, Pepcid 20 mg qd po, heparin 5000U q 8 hr, ISS,  renvela 800mg tid po,  labetalol 300 mg tid po,    (X) Renal diet as tolerated    1/11/2021 14:43:15 EST by Gurpreet Mancilla is on renal diet, consitent carb [phone] Kcal    Interventions    (X) Possible dialysis    1/11/2021 14:43:15 EST by Gurpreet Mancilla is on HD , getting on schedule    (X) Possibly establish long-term access for dialysis    1/11/2021 14:43:15 EST by Reno Harrison      has HD catheter    (X) Monitor electrolytes, renal function tests, acid-base, and volume status    1/11/2021 14:43:15 EST by Reno Harrison      electrolytes, labs , renal function tests monitored. Medications    (X) Possible medical therapies    1/11/2021 14:43:15 EST by Reno Harrison      receiving HD inpt until chair time established    * Milestone   Additional Notes   1/7/21 continued stay review   Internal Med note   No acute events overnight.  Patient clinically stable.  Still awaiting chair placement for dialysis      VS:  143/88- 68- 18- 98.5-96% RA   General:          Well nourished.  Alert.  No distress, obese   CV:                  RRR.  No murmur, rub, or gallop.  No edema Lungs:             CTAB.  No wheezing, rhonchi, or rales. Abdomen:        Soft, nontender, nondistended. Obese, present BS   Extremities:     Warm and dry. Skin:                No rashes or jaundice. Neuro:             No gross focal deficits      Plan:           New onset JOSSELIN progressed to CKD, high risk for ESRD-  on new hemodialysis:   can discharge once has HD slot   TCC in place    Completed course of empiric rocephin 7 days           HTN:   Continued norvasc, clonidine, labetalol           DM2: On SSI   DM2 education   Minimal insulin use since admission.  A1c is likely falsely elevated secondary to ESRD           DC planning/Dispo:  To home once she has outpatient hemodialysis slot      Nephrology note: Follow-Up on: JOSSELIN on CKD        HPI: Pt seen and examined on HD dialyzing via right TCC- 350 Qb, UF 2100 tolerating dialysis no new complaints.        ROS:   General: no fever/chills, appetite ok   CV: no CP   Lung: no SOB, no cough   GI: no N/V/D   Ext: no edema    right TCC- intact   Plan:   1. JOSSELIN /ESRD- Pt continues to get education about dialysis and kidney failure and what to expect. recommend she read up on dialysis and transplant at [email or web address].    2. S/p Renal bx c/w collapsing glomerulopathy    3. First dialysis 12/24   4. [address] placed 12/30/21   5. Disposition awaiting outpt slot under JOSSELIN   6.  Anemia on alie       WBC Latest Ref Range: 4.3 - 11.1 K/uL 6.1   RBC Latest Ref Range: 4.05 - 5.2 M/uL 3.51 (L)   HGB Latest Ref Range: 11.7 - 15.4 g/dL 8.9 (L)   HCT Latest Ref Range: 35.8 - 46.3 % 28.5 (L)   MCV Latest Ref Range: 79.6 - 97.8 FL 81.2   MCH Latest Ref Range: 26.1 - 32.9 PG 25.4 (L)   MCHC Latest Ref Range: 31.4 - 35.0 g/dL 31.2 (L)   RDW Latest Ref Range: 11.9 - 14.6 % 13.9   PLATELET Latest Ref Range: 150 - 450 K/uL 140 (L)   MPV Latest Ref Range: 9.4 - 12.3 FL 9.9   NEUTROPHILS Latest Ref Range: 43 - 78 % 56   LYMPHOCYTES Latest Ref Range: 13 - 44 % 29   MONOCYTES Latest Ref Range: 4.0 - 12.0 % 11   EOSINOPHILS Latest Ref Range: 0.5 - 7.8 % 2   BASOPHILS Latest Ref Range: 0.0 - 2.0 % 2   IMMATURE GRANULOCYTES Latest Ref Range: 0.0 - 5.0 % 0   DF Latest Units:   AUTOMATED   ABSOLUTE NRBC Latest Ref Range: 0.0 - 0.2 K/uL 0.00   ABS. NEUTROPHILS Latest Ref Range: 1.7 - 8.2 K/UL 3.4   ABS. IMM. GRANS. Latest Ref Range: 0.0 - 0.5 K/UL 0.0   ABS. LYMPHOCYTES Latest Ref Range: 0.5 - 4.6 K/UL 1.8   ABS. MONOCYTES Latest Ref Range: 0.1 - 1.3 K/UL 0.7   ABS. EOSINOPHILS Latest Ref Range: 0.0 - 0.8 K/UL 0.1   ABS. BASOPHILS Latest Ref Range: 0.0 - 0.2 K/UL 0.1   Sodium Latest Ref Range: 136 - 145 mmol/L 136   Potassium Latest Ref Range: 3.5 - 5.1 mmol/L 3.9   Chloride Latest Ref Range: 98 - 107 mmol/L 102   CO2 Latest Ref Range: 21 - 32 mmol/L 28   Anion gap Latest Ref Range: 7 - 16 mmol/L 6 (L)   Glucose Latest Ref Range: 65 - 100 mg/dL 125 (H)   BUN Latest Ref Range: 6 - 23 MG/DL 34 (H)   Creatinine Latest Ref Range: 0.6 - 1.0 MG/DL 7.96 (H)   Calcium Latest Ref Range: 8.3 - 10.4 MG/DL 9.3   Phosphorus Latest Ref Range: 2.5 - 4.5 MG/DL 5.7 (H)   GFR est non-AA Latest Ref Range: >60 ml/min/1.73m2 6 (L)   GFR est AA Latest Ref Range: >60 ml/min/1.73m2 7 (L)   Bilirubin, total Latest Ref Range: 0.2 - 1.1 MG/DL 0.4   Protein, total Latest Ref Range: 6.3 - 8.2 g/dL 7.2   Albumin Latest Ref Range: 3.5 - 5.0 g/dL 2.2 (L)   Globulin Latest Ref Range: 2.3 - 3.5 g/dL 5.0 (H)   A-G Ratio Latest Ref Range: 1.2 - 3.5   0.4 (L)   ALT Latest Ref Range: 12 - 65 U/L 10 (L)   AST Latest Ref Range: 15 - 37 U/L 13 (L)   Alk.  phosphatase Latest Ref Range: 50 - 136 U/L 103      Meds: Tylenol 650 mg q 6 hr prn po x 1, Norvasc 5 mg bid po, catapres 0.2mg bid po, Pepcid 20 mg qd po, heparin 5000U q 8 hr, ISS,  renvela 800mg tid po,  labetalol 300 mg tid po,          Renal Failure, Acute - Care Day 14 (1/6/2021) by Charlotte Streeter       Review Entered Review Status   1/8/2021 09:11 Completed      Criteria Review      Care Day: 14 Care Date: 1/6/2021 Level of Care: Inpatient Floor    Guideline Day 4    Level Of Care    (X) Floor to discharge    1/8/2021 09:11:22 EST by Skye Villagran      telemetry    Clinical Status    (X) * Hemodynamic stability    1/8/2021 09:11:22 EST by Skye Villagran      Temp 98.3, HR 77, /72, RR 18, 96% on RA    (X) * Mental status at baseline    1/8/2021 09:11:22 EST by Skye Villagran      General:          Well nourished. Sudheer Salvador.    (X) * Tachypnea absent    1/8/2021 09:11:22 EST by Skye Villagran      RR 18    (X) * Hypoxemia absent    1/8/2021 09:11:22 EST by Skye Villagran      96% on RA    (X) * Etiology requiring inpatient treatment absent    1/8/2021 09:11:22 EST by Joy Harris ·New onset JOSSELIN progressed to CKD, high risk for ESRD-  on new hemodialysis:  Jodi Guaman discharge once has HD slot    (X) * Electrolyte abnormalities absent or acceptable for next level of care    1/8/2021 09:11:22 EST by Skye Villagran      Sodium: 136  Chloride: 101    (X) * Acid-base abnormalities absent    1/8/2021 09:11:22 EST by Skye Villagran      CO2: 29    (X) * Volume status at baseline or acceptable for next level of care    1/8/2021 09:11:22 EST by Skye Villagran      Sodium: 136    (X) * Diet tolerated    1/8/2021 09:11:22 EST by Skye Villagran      renal diet    (X) * Dialysis not needed or access and plan established    1/8/2021 09:11:22 EST by Skye Villagran      [facility] placed 12/30/21  ·New onset JOSSELIN progressed to CKD, high risk for ESRD-  on new hemodialysis:  Jodi Guaman discharge once has HD slot    ( ) * Discharge plans and education understood    Activity    (X) * Ambulatory or acceptable for next level of care [J]    1/8/2021 09:11:22 EST by Skye Villagran      activity as tolerated with assist    Routes    (X) * Oral hydration, medications, and diet    1/8/2021 09:11:22 EST by Skye Villagran      renal diet  Amlodipine 5mg PO BID  Clonidine 0.2mg PO BID  Pepcid 20mg PO daily  Labetalol 300mg PO TID    (X) Renal diet as tolerated    1/8/2021 09:11:22 EST by Adryan Hill      renal diet    Interventions    (X) Possible dialysis    1/8/2021 09:11:22 EST by Adryan Hill      hemodialysis    (X) Possibly establish long-term access for dialysis    1/8/2021 09:11:22 EST by Brandee Karri placed 12/30/21    (X) Monitor electrolytes, renal function tests, acid-base, and volume status    1/8/2021 09:11:22 EST by Adryan Hill      see below    Medications    (X) Possible medical therapies    1/8/2021 09:11:22 EST by Adryan Hill      Sevelamer 800mg PO TID with meals    * Milestone   Additional Notes   Date of care: 1/6/2021 Care day 14      IP - LOC- Telemetry      IM PN: 1/6   No acute events overnight.  Patient clinically stable.  Still awaiting chair placement for dialysis. General:          Well nourished.  Alert.  No distress, obese   CV:                  RRR.  No murmur, rub, or gallop. No edema    Lungs:             CTAB.  No wheezing, rhonchi, or rales. Abdomen:        Soft, nontender, nondistended. Obese, present BS   Extremities:     Warm and dry. Skin:                No rashes or jaundice. Neuro:             No gross focal deficits   Plan:   · New onset JOSSELIN progressed to CKD, high risk for ESRD-  on new hemodialysis:   · can discharge once has HD slot   · TCC in place    · Completed course of empiric rocephin 7 days      · HTN:   · Continued norvasc, clonidine, labetalol       · DM2:   · On SSI   · DM2 education   · Minimal insulin use since admission.  A1c is likely falsely elevated secondary to ESRD      Nephrology PN: Plan:   1. JOSSELIN /ESRD- Pt continues to get education about dialysis and kidney failure and what to expect. I recommend she read up on dialysis and transplant at [email or web address]. All questions answered. 2. S/p Renal bx c/w collapsing glomerulopathy  C/w APOL-1   3. First dialysis 12/24   4.  TDC placed 12/30/21   5. Disposition awaiting outpt slot under JOSSELIN   6. Anemia on alie      Vitals: Temp 98.3, HR 77, /72, RR 18, 96% on RA      Labs:    1/6/2021 05:41   RBC: 3.55 (L)   HGB: 9.0 (L)   HCT: 28.7 (L)   MCV: 80.8   MCH: 25.4 (L)   MCHC: 31.4   PLATELET: 269 (L)   MPV: 9.8   LYMPHOCYTES: 28   MONOCYTES: 13 (H)   EOSINOPHILS: 2   IMMATURE GRANULOCYTES: 0   ABS. IMM. GRANS.: 0.0   Sodium: 136   Chloride: 101   CO2: 29   Anion gap: 6 (L)   Glucose: 110 (H)   BUN: 27 (H)   Creatinine: 6.96 (H)   Calcium: 9.0   Phosphorus: 4.9 (H)   GFR est non-AA: 7 (L)   GFR est AA: 9 (L)   Protein, total: 7.3   Albumin: 2.1 (L)   Globulin: 5.2 (H)   A-G Ratio: 0.4 (L)   ALT: 11 (L)   AST: 16   Alk.  phosphatase: 103      Medications:   Retacrit 10,000units SC every 7 days   Heparin 5,000units SC q8   Insulin lispro SC QID ACHS SSI      Plan:  daily CBC, daily CMP, daily renal function panel, renal diet, activity as tolerated with assist, SCDs, hemodialysis, consult diabetes management, POC glucose QID ACHS, strict I&Os            Renal Failure, Acute - Care Day 13 (1/5/2021) by Rehan Coffman       Review Entered Review Status   1/6/2021 15:32 Completed      Criteria Review      Care Day: 13 Care Date: 1/5/2021 Level of Care: Inpatient Floor    Guideline Day 4    Level Of Care    (X) Floor to discharge    1/6/2021 15:32:58 EST by Rehan Coffman      telemetry    Clinical Status    (X) * Hemodynamic stability    1/6/2021 15:32:58 EST by Rehan Coffman      Temp 98.5, HR 97, /103, RR 18, 96% on RA    (X) * Mental status at baseline    1/6/2021 15:32:58 EST by Rehan Cofmfan      General:          Well nourished.  Alert.  No distress, obese    (X) * Tachypnea absent    1/6/2021 15:32:58 EST by Rehan Coffman      RR 18    (X) * Hypoxemia absent    1/6/2021 15:32:58 EST by Rehan Coffman      96% on RA    (X) * Etiology requiring inpatient treatment absent    1/6/2021 15:32:58 EST by Rehan Coffman   JOSSELIN ?ESRD- seen on HD, tolerating dialysis    (X) * Electrolyte abnormalities absent or acceptable for next level of care    1/6/2021 15:32:58 EST by Jake Sulaiman      1/5/2021 05:39  Glucose: 122 (H)  BUN: 42 (H)  Creatinine: 9.82 (H)  Calcium: 9.6  Phosphorus: 6.7 (H)  GFR est non-AA: 5 (L)  GFR est AA: 6 (L)  Albumin: 2.1 (L)    (X) * Acid-base abnormalities absent    1/6/2021 15:32:58 EST by Jake Sulaiman      1/5/2021 05:39  CO2: 26    (X) * Volume status at baseline or acceptable for next level of care    1/6/2021 15:32:58 EST by Jake Sulaiman      General:          Well nourished    (X) * Diet tolerated    1/6/2021 15:32:58 EST by Jake Sulaiman      renal diet    (X) * Dialysis not needed or access and plan established    1/6/2021 15:32:58 EST by Beverly Obrien can discharge once has HD slot    ( ) * Discharge plans and education understood    Activity    (X) * Ambulatory or acceptable for next level of care [J]    1/6/2021 15:32:58 EST by Jake Sulaiman      activity as tolerated with assist    Routes    (X) * Oral hydration, medications, and diet    1/6/2021 15:32:58 EST by Jake Sulaiman      renal diet  Amlodipine 5mg PO BID  Clonidine 0.2mg PO BID  Pepcid 20mg PO daily  Hydralazine 50mg PO q6 PRN x1  Labetalol 300mg PO TID  Amlodipine 5mg PO daily    (X) Renal diet as tolerated    1/6/2021 15:32:58 EST by Vysrser      renal diet    Interventions    (X) Possible dialysis    1/6/2021 15:32:58 EST by Jake Sulaiman      hemodialysis    (X) Possibly establish long-term access for dialysis    1/6/2021 15:32:58 EST by Jake Sulaiman      [facility] [address] placed 12/30/21    (X) Monitor electrolytes, renal function tests, acid-base, and volume status    1/6/2021 15:32:58 EST by Jake Sulaiman      daily renal function panel    Medications    (X) Possible medical therapies    1/6/2021 15:32:58 EST by Jake Hilario      Sevelamer 800mg PO TID with meals * Milestone   Additional Notes   Date of care: 1/5/2021 Care day 13      IP - LOC- Telemetry      IM PN: 1/5/21 had HD today   Making urine   Has been counseled ESRD diet   General:          Well nourished.  Alert.  No distress, obese   CV:                  RRR.  No murmur, rub, or gallop. No edema    Lungs:             CTAB.  No wheezing, rhonchi, or rales. Abdomen:        Soft, nontender, nondistended. Obese, present BS   Extremities:     Warm and dry. Skin:                No rashes or jaundice. Neuro:             No gross focal deficits   Plan:   · New onset JOSSELIN progressed to CKD, high risk for ESRD-  on new hemodialysis:   · can discharge once has HD slot   · TCC in place    · Completed course of empiric rocephin 7 days   · HTN:   · Continued norvasc, clonidine, labetalol   · DM2:   · On SSI   · DM2 education      Nephrology PN: HPI: Pt seen and examined on HD, dialyzing via right  Qb, UF 2600 no new complaints, tolerating dialysis. Issues Addressed By Nephrology:       Plan:   1. JOSSELIN ? ESRD- seen on HD, tolerating dialysis    2. S/p Renal bx c/w collapsing glomerulopathy  C/w APOL-1   3. First dialysis 12/24   4. [address] placed 12/30/21   5. Disposition awaiting outpt slot under JOSSELIN   6.  Anemia on alie       Vitals: Temp 98.5, HR 97, /103, RR 18, 96% on RA      Labs:    1/5/2021 05:39   Glucose: 122 (H)   BUN: 42 (H)   Creatinine: 9.82 (H)   Calcium: 9.6   Phosphorus: 6.7 (H)   GFR est non-AA: 5 (L)   GFR est AA: 6 (L)   Albumin: 2.1 (L)      Medications:   Heparin 5,000units SC q8   Insulin lispro SC QID ACHS SSI      Plan:  daily renal function panel, renal diet, activity as tolerated with assist, SCDs, hemodialysis, consult diabetes management, POC glucose QID ACHS, strict I&Os            Renal Failure, Acute - Care Day 12 (1/4/2021) by Conrad Mcardle       Review Entered Review Status   1/5/2021 13:23 Completed      Criteria Review      Care Day: 12 Care Date: 1/4/2021 Level of Care: Inpatient Floor    Guideline Day 4    Clinical Status    (X) * Hemodynamic stability    1/5/2021 13:23:07 EST by Sandy Larson      147/91    (X) * Mental status at baseline    1/5/2021 13:23:07 EST by Sandy Larson      AAO x 3    (X) * Tachypnea absent    1/5/2021 13:23:07 EST by Sandy Larson      no tachypnea    (X) * Hypoxemia absent    1/5/2021 13:23:07 EST by Snady Larson      Sats 99% RA    (X) * Etiology requiring inpatient treatment absent    (X) * Electrolyte abnormalities absent or acceptable for next level of care    1/5/2021 13:23:07 EST by Sandy Larson      no lab work drawn this date    (X) * Acid-base abnormalities absent    1/5/2021 13:23:07 EST by Sandy Larson      no lab work drawn    (X) * Volume status at baseline or acceptable for next level of care    1/5/2021 13:23:07 EST by Sandy Larson      renal diet    (X) * Diet tolerated    1/5/2021 13:23:07 EST by Sandy Larson      tolerating diet    ( ) * Dialysis not needed or access and plan established    ( ) * Discharge plans and education understood    Activity    (X) * Ambulatory or acceptable for next level of care [J]    1/5/2021 13:23:07 EST by Sandy Larson      activity as tolerated    Routes    (X) * Oral hydration, medications, and diet    1/5/2021 13:23:07 EST by Sandy Larson      renal reg consistent carb 1500-1600kcal diet. * Milestone   Additional Notes   1/4/21 continued stay review      Renal note:  denies CP or SOB    Plan:   1. JOSSELIN ? ESRD   2. S/p Renal bx c/w collapsing glomerulopathy  C/w APOL-1   3. First dialysis 12/24   4. [facility] placed 12/30/21   5. Disposition awaiting outpt slot under JOSSELIN   6.  Next HD in am         VS:  147/91-  78-  18-  97.9-  98%RA      Labs: blood glucose 160-  175      MEDS: renvela 800mg po tid; normodyne 300mg po tid; pepcid 20mg po qd; catapres 0.2mg po bid; norvasc 5mg po qd, heparin 5000un sq q8H; humalog ssi ac/hs: &U, 11U,  2U,   21U,

## 2022-03-18 PROBLEM — N17.9 ACUTE RENAL FAILURE (ARF) (HCC): Status: ACTIVE | Noted: 2020-12-24

## 2022-03-18 PROBLEM — R79.89 ELEVATED PROCALCITONIN: Status: ACTIVE | Noted: 2020-12-24

## 2022-03-19 PROBLEM — Z86.79 HX OF ESSENTIAL HYPERTENSION: Status: ACTIVE | Noted: 2020-12-24

## 2022-03-20 PROBLEM — N17.9 ACUTE RENAL FAILURE ON DIALYSIS (HCC): Status: ACTIVE | Noted: 2020-12-31

## 2022-03-20 PROBLEM — Z99.2 ACUTE RENAL FAILURE ON DIALYSIS (HCC): Status: ACTIVE | Noted: 2020-12-31

## 2022-03-20 PROBLEM — E66.9 OBESITY: Status: ACTIVE | Noted: 2020-12-24

## 2022-03-20 PROBLEM — D50.9 MICROCYTIC ANEMIA: Status: ACTIVE | Noted: 2020-12-24

## 2023-03-27 ENCOUNTER — OFFICE VISIT (OUTPATIENT)
Dept: INTERNAL MEDICINE CLINIC | Facility: CLINIC | Age: 38
End: 2023-03-27
Payer: MEDICAID

## 2023-03-27 VITALS
SYSTOLIC BLOOD PRESSURE: 120 MMHG | BODY MASS INDEX: 41.65 KG/M2 | HEART RATE: 132 BPM | HEIGHT: 65 IN | DIASTOLIC BLOOD PRESSURE: 76 MMHG | WEIGHT: 250 LBS | OXYGEN SATURATION: 100 %

## 2023-03-27 DIAGNOSIS — R73.03 PREDIABETES: ICD-10-CM

## 2023-03-27 DIAGNOSIS — N17.9 ACUTE RENAL FAILURE, UNSPECIFIED ACUTE RENAL FAILURE TYPE (HCC): Primary | ICD-10-CM

## 2023-03-27 DIAGNOSIS — I11.0 CARDIOMYOPATHY DUE TO HYPERTENSION, WITH HEART FAILURE (HCC): ICD-10-CM

## 2023-03-27 DIAGNOSIS — I43 CARDIOMYOPATHY DUE TO HYPERTENSION, WITH HEART FAILURE (HCC): ICD-10-CM

## 2023-03-27 DIAGNOSIS — N18.4 CRI (CHRONIC RENAL INSUFFICIENCY), STAGE 4 (SEVERE) (HCC): ICD-10-CM

## 2023-03-27 PROCEDURE — 99214 OFFICE O/P EST MOD 30 MIN: CPT | Performed by: INTERNAL MEDICINE

## 2023-03-27 RX ORDER — ISOSORBIDE DINITRATE 10 MG/1
10 TABLET ORAL 2 TIMES DAILY
Qty: 90 TABLET | Refills: 1 | Status: SHIPPED | OUTPATIENT
Start: 2023-03-27

## 2023-03-27 RX ORDER — TORSEMIDE 20 MG/1
20 TABLET ORAL 2 TIMES DAILY
COMMUNITY
Start: 2023-03-20 | End: 2023-03-27 | Stop reason: SDUPTHER

## 2023-03-27 RX ORDER — ATORVASTATIN CALCIUM 40 MG/1
40 TABLET, FILM COATED ORAL DAILY
COMMUNITY
Start: 2023-03-20 | End: 2023-03-27 | Stop reason: SDUPTHER

## 2023-03-27 RX ORDER — ATORVASTATIN CALCIUM 40 MG/1
40 TABLET, FILM COATED ORAL DAILY
Qty: 30 TABLET | Refills: 5 | Status: SHIPPED | OUTPATIENT
Start: 2023-03-27

## 2023-03-27 RX ORDER — ISOSORBIDE DINITRATE 10 MG/1
10 TABLET ORAL 2 TIMES DAILY
COMMUNITY
Start: 2023-03-20 | End: 2023-03-27 | Stop reason: SDUPTHER

## 2023-03-27 RX ORDER — CARVEDILOL 12.5 MG/1
12.5 TABLET ORAL 2 TIMES DAILY WITH MEALS
Qty: 60 TABLET | Refills: 5 | Status: SHIPPED | OUTPATIENT
Start: 2023-03-27

## 2023-03-27 RX ORDER — CARVEDILOL 12.5 MG/1
12.5 TABLET ORAL 2 TIMES DAILY WITH MEALS
COMMUNITY
End: 2023-03-27 | Stop reason: SDUPTHER

## 2023-03-27 RX ORDER — TORSEMIDE 20 MG/1
20 TABLET ORAL 2 TIMES DAILY
Qty: 60 TABLET | Refills: 5 | Status: SHIPPED | OUTPATIENT
Start: 2023-03-27

## 2023-03-27 RX ORDER — HYDRALAZINE HYDROCHLORIDE 25 MG/1
25 TABLET, FILM COATED ORAL 3 TIMES DAILY
COMMUNITY
Start: 2023-03-20 | End: 2023-03-27 | Stop reason: SDUPTHER

## 2023-03-27 RX ORDER — HYDRALAZINE HYDROCHLORIDE 25 MG/1
25 TABLET, FILM COATED ORAL 3 TIMES DAILY
Qty: 90 TABLET | Refills: 5 | Status: SHIPPED | OUTPATIENT
Start: 2023-03-27

## 2023-03-27 ASSESSMENT — ENCOUNTER SYMPTOMS
VOMITING: 0
WHEEZING: 0
DIARRHEA: 0
SINUS PAIN: 0
SHORTNESS OF BREATH: 0
CONSTIPATION: 0
CHEST TIGHTNESS: 0
ABDOMINAL PAIN: 0
NAUSEA: 0

## 2023-03-27 NOTE — PROGRESS NOTES
tablet; Take 1 tablet by mouth 3 times daily  -     isosorbide dinitrate (ISORDIL) 10 MG tablet; Take 1 tablet by mouth in the morning and at bedtime  -     torsemide (DEMADEX) 20 MG tablet; Take 1 tablet by mouth in the morning and at bedtime  -     carvedilol (COREG) 12.5 MG tablet;  Take 1 tablet by mouth 2 times daily (with meals)  -     Inder Metcalf MD, Nephrology, [place]  -     [address] [place] [address] Outpatient Nutrition Counseling        Wt Readings from Last 3 Encounters:   03/27/23 250 lb (113.4 kg)              Bouchra Yeh DO

## 2023-04-04 NOTE — DIALYSIS
Follow up visit         I have reviewed the nurse’s notes and assessment and agree.      CHIEF COMPLAINT      Chief Complaint   Patient presents with   • Office Visit     Positive ESPERANZA (antinuclear antibody)    More pain since last apt but on feet more now than before. Ankles/Knees worst joint pain.        HISTORY OF PRESENT ILLNESS    Ms. Sheri Felton is a 26 year old year old female who is following for abnormal labs, high-risk medication use.  During last visit 1/3/2023 patient was started on hydroxychloroquine at 400 mg a day. On 3/2/2023 patient underwent tonsillectomy and adenoidectomy.  Laboratory studies since the last visit were reviewed:  3/31/2023 blood counts were good with no anemia, normal white count and normal platelet count, patient had slightly elevated globulin at 4.3, albumin was 3.7, urinalysis was negative for blood, negative for protein  Patient states she is feeling more tired.  She is noticing achiness of her legs, especially by the end of the day.  She sleeps well after some of her medications were changed.  Sertraline was stopped, she was started on clomipramine and the Abilify, the dose of seroquel was increased.  She had episode of kidney stones which had resolved.  She likely passed the small stone that she had.  Overall pain, swelling and fatigue is worse.  She had few lb weight loss, she is noticing dryness of the eyes, she bruises easily, the rest of the review of system is negative, it is reviewed as per patient's history update sheet.  She had started gluten free diet, she has gluten sensitivity, she believes it is beneficial.  Patient was very busy with multiple appointments and was not able to start physical therapy, but she is looking forward to going back to it.  A couple weeks ago she changed her job.  She still works for [employer], but her job is now more physical, she is more on her feet.  The location had also changed, it is closer to her home.  Patient was also  TRANSFER OUT- DIALYSIS    Hemodialysis treatment completed without complications. Patient alert and VS stable  /88  P 90       1.4 Kgs removed. Flushed both ports with 10 mL of NS.  CVC dressing clean, dry, and intact, tego caps intact, bilateral lumens wrapped with 4x4 gauze. Meds given-0. 0 units of RBCs given during dialysis. Patient to 714 after dialysis. walking more in the evening.  She believes the new schedule could be related to her fatigue and increased achiness in her legs.    Office visit 01/03/2023. Patient presents to Rheumatology for follow-up.  Patient was seen for initial visit 12/15/2022.  She was advised to obtain extensive laboratory evaluation.  Laboratory studies had demonstrated positive ESPERANZA 1:160 homogeneous pattern, CRP elevated at 2.7, ESR 56, SSB antibodies 3.6, the rest of the reflex was negative.  Albumin 3.6, globulin 3.7, creatinine 0.69.  Patient also underwent laboratory testing 1/1/2023, she would slightly elevated LFTs with AST of 43, alkaline phosphatase 95, creatinine 0.71.  Patient had been evaluated by physical therapy for benign joint hypermobility , she is looking forward to participate in treatment.  Patient was contacted regarding the blood test results and discuss potential treatment with hydroxychloroquine.  Patient was thinking and researching the medication, she is strongly willing to consider it.    Initial visit 12/15/2022. Patient presents to Rheumatology by request of her primary care physician for evaluation of elevated erythrocyte sedimentation rate and arthralgias.  Patient notes that she had joint problems her entire life.  She is not noticing swelling of the joints.  She may develop random pain in the joints all over in her body.  It feels like she wants to pop the joints, they feel like they are coming out of their sockets.  Sometimes she tries to maneuver her joints a certain way to make them feel better.  At times the joint might be in excruciating pain for a little while.  Usually 1 joint is affected at a time.  Patient is not noticing any morning stiffness.  Knees or ankles tend to be more symptomatic.  Patient did have sprains of the ankles.  Primary care physician had advise patient to try turmeric.  Patient took it for awhile, she had not noticed any significant benefit and had forgot to continue it.  She  did not take any over-the-counter pain medications.  Patient denies difficulty using her hands to grasp small objects, at times it might be difficult for her to go the stairs usually because of the ankle discomfort, it may be difficult to get up from the chair, otherwise she is functioning well.  Because of family history of lupus in her cousin and unclear autoimmune disease in her hand patient is concerned if her symptoms could be related to autoimmune disease.    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  Patient does not smoke and had never smoked, she does not drink any alcohol, she denies illegal drug use, patient does not exercise on a regular basis.  She sleeps 6-7 hours per night.    Family history:  Parents are 43 years old, they have problems with alcohol use, father also has asthma.  Her sister has joint hypermobility and already needs knee replacement, she is 22, aunt has some sort of autoimmune disease, cousin has lupus.  Patient denies family history of rheumatoid arthritis or gout.    PAST MEDICAL HISTORY    Past Medical History:   Diagnosis Date   • Anxiety    • Depression    • Disordered eating    • Gastroesophageal reflux disease    • PCOS (polycystic ovarian syndrome)    • Prediabetes 08/08/2022   • PTSD (post-traumatic stress disorder)    • Tic disorder        PAST SURGICAL HISTORY    Past Surgical History:   Procedure Laterality Date   • Colonoscopy  11/15/2022    Screening colonoscopy in 5 years.   • Egd  11/15/2022   • Oral surgery procedure Bilateral    • Remove tonsils/adenoids,<13 y/o  03/02/2023       FAMILY HISTORY  Family History   Problem Relation Age of Onset   • Depression Mother    • Obsesive Compulsive Disorder Mother    • Alcohol Abuse Mother    • Hyperlipidemia Mother    • Psychiatric Father    • Alcohol Abuse Father    • Hyperlipidemia Maternal Grandmother    • COPD Maternal Grandfather    • Cancer Maternal  Grandfather         lung   • Gastrointestinal Paternal Grandmother         barretts esophagus   • Diabetes Paternal Grandfather    • Kidney disease Paternal Grandfather    • Myocardial Infarction Paternal Grandfather         heart attack   • Hypertension Paternal Grandfather        SOCIAL HISTORY  Social History     Socioeconomic History   • Marital status: Single     Spouse name: Not on file   • Number of children: 0   • Years of education: Not on file   • Highest education level: Not on file   Occupational History   • Occupation: [employer] -     Tobacco Use   • Smoking status: Never   • Smokeless tobacco: Never   Vaping Use   • Vaping Use: never used   Substance and Sexual Activity   • Alcohol use: Not Currently   • Drug use: Not Currently   • Sexual activity: Never   Other Topics Concern   • Not on file   Social History Narrative   • Not on file     Social Determinants of Health     Financial Resource Strain: Low Risk    • Social Determinants: Financial Resource Strain: None   Food Insecurity: No Food Insecurity   • Social Determinants: Food Insecurity: Never   Transportation Needs: No Transportation Needs   • Lack of Transportation (Medical): No   • Lack of Transportation (Non-Medical): No   Physical Activity: Not on file   Stress: Not on file   Social Connections: Socially Integrated   • Social Determinants: Social Connections: 3 to 5 times a week   Intimate Partner Violence: At Risk   • Social Determinants: Intimate Partner Violence Past Fear: Yes   • Social Determinants: Intimate Partner Violence Current Fear: No       MEDICATIONS    Current Outpatient Medications   Medication Sig   • clomiPRAMINE (ANAFRANIL) 50 MG capsule Take 2 capsules by mouth nightly.   • clonazePAM (KlonoPIN) 0.5 MG tablet TAKE 1 TABLET BY MOUTH IN THE MORNING AND IN THE EVENING. MUST LAST 30 DAYS.   • hydroxychloroquine (PLAQUENIL) 200 MG tablet Take 1 tablet twice daily with food   • ARIPiprazole (ABILIFY) 5 MG tablet  Take 0.5 tablets by mouth daily.   • QUEtiapine (SEROquel) 100 MG tablet TAKE 1 TABLET BY MOUTH EVERY NIGHT   • hydroxychloroquine (PLAQUENIL) 200 MG tablet Take 1 tablet by mouth in the morning and 1 tablet in the evening.   • Cholecalciferol (Vitamin D3) 50 mcg (2,000 units) capsule Take 2 capsules by mouth daily.   • omeprazole (PrilOSEC) 20 MG capsule Take 1 capsule by mouth daily.   • desogestrel-ethinyl estradiol (APRI) 0.15-30 MG-MCG per tablet Take 1 tablet by mouth daily.     No current facility-administered medications for this visit.       ALLERGIES    ALLERGIES:   Allergen Reactions   • Gluten Meal   (Food Or Med) GI UPSET     Patient states, \"Possibel Celiac disease. Get GI upset and stomach pains when I eat gluten.\"   • Pepcid HIVES   • Famotidine HIVES       PHYSICIAL EXAM    Vital Signs: Blood pressure 134/82, pulse (!) 102, temperature 98.3 °F (36.8 °C), temperature source Temporal, height 5' 7\" (1.702 m), weight 132.3 kg (291 lb 10.7 oz), last menstrual period 04/03/2023, SpO2 97 %.  Constitutional:  Well developed, well nourished, no acute distress, non-toxic appearance. Pleasant. Cooperative.  Appropriate affect and insight, good eye contact, well groomed.  Eyes: No scleral icterus. Conjunctivae normal.    HENT:  Atraumatic. No malar rash.   Neck:  Normal range of motion. No tenderness. Supple. No masses.  Lungs:  No respiratory distress. Good air entry. No rales. No wheezing.  Cardiac:  S1, S2, regular. No peripheral edema.   Abdomen:  Soft, nondistended. Normal bowel sounds. Nontender.   Musculoskeletal:  There is no synovitis of the DIPs, PIPs, MCPs, hyperextension of the MCPs, no synovitis of the wrists, good  strength, no synovitis of the elbows, mild hyperextension of the elbows, good range of motion of the shoulders, good range of motion of the C-spine, good range of motion of both hips, no synovitis or effusion of the knees, ankles, MTPs.  Muscle strength 5/5 in upper lower  extremities.    Skin: no rash. No induration.   Lymphatic:  No lymphadenopathy noted.    Neurologic:  Alert & oriented x 3.      LABORATORY  Admission on 03/09/2023, Discharged on 03/09/2023   Component Date Value Ref Range Status   • WBC 03/09/2023 13.6 (A)  4.2 - 11.0 K/mcL Final   • RBC 03/09/2023 4.44  4.00 - 5.20 mil/mcL Final   • HGB 03/09/2023 12.3  12.0 - 15.5 g/dL Final   • HCT 03/09/2023 38.9  36.0 - 46.5 % Final   • MCV 03/09/2023 87.6  78.0 - 100.0 fl Final   • MCH 03/09/2023 27.7  26.0 - 34.0 pg Final   • MCHC 03/09/2023 31.6 (A)  32.0 - 36.5 g/dL Final   • RDW-CV 03/09/2023 14.2  11.0 - 15.0 % Final   • RDW-SD 03/09/2023 45.4  39.0 - 50.0 fL Final   • PLT 03/09/2023 358  140 - 450 K/mcL Final   • NRBC 03/09/2023 0  <=0 /100 WBC Final   • Neutrophil, Percent 03/09/2023 70  % Final   • Lymphocytes, Percent 03/09/2023 23  % Final   • Mono, Percent 03/09/2023 5  % Final   • Eosinophils, Percent 03/09/2023 2  % Final   • Basophils, Percent 03/09/2023 0  % Final   • Immature Granulocytes 03/09/2023 0  % Final   • Absolute Neutrophils 03/09/2023 9.4 (A)  1.8 - 7.7 K/mcL Final   • Absolute Lymphocytes 03/09/2023 3.2  1.0 - 4.8 K/mcL Final   • Absolute Monocytes 03/09/2023 0.7  0.3 - 0.9 K/mcL Final   • Absolute Eosinophils  03/09/2023 0.3  0.0 - 0.5 K/mcL Final   • Absolute Basophils 03/09/2023 0.1  0.0 - 0.3 K/mcL Final   • Absolute Immature Granulocytes 03/09/2023 0.0  0.0 - 0.2 K/mcL Final      CBC  Recent Labs   Lab 03/31/23  1109 03/09/23  1904 02/23/23  1300   WBC 9.0 13.6* 10.5   RBC 4.60 4.44 4.79   HGB 12.7 12.3 13.2   HCT 40.6 38.9 42.0   MCV 88.3 87.6 87.7   MCH 27.6 27.7 27.6   MCHC 31.3* 31.6* 31.4*   RDW-CV 13.8 14.2 14.4    358 413       CMP  Recent Labs   Lab 03/31/23  1109 02/23/23  1300 01/29/23  2316 08/07/22  2250 07/15/22  1032   Fasting Status  --   --   --   --  12   Sodium 139 140 142   < >  --    Potassium 3.8 3.7 3.1*   < >  --    Chloride 101 105 105   < >  --    Carbon  Dioxide 27 23 25   < >  --    Anion Gap 15 16 15   < >  --    Glucose 109* 99 99   < >  --    Creatinine 0.77 0.74 0.69   < >  --    GPT 25 25 29   < >  --    Alkaline Phosphatase 117 103 113   < >  --    Globulin 4.3* 4.2* 4.0   < >  --    A/G Ratio 0.9* 0.9* 1.0   < >  --     < > = values in this interval not displayed.       ESPERANZA WITH REFLEX  No results for input(s): ANABL in the last 8765 hours.    RHEUMATOID FACTOR  No results for input(s): RA in the last 8765 hours.    CYCLIC CITRULLINATED PEPTIDE  No results for input(s): CCPA in the last 8765 hours.    URIC ACID  No results for input(s): URIC in the last 8765 hours.    ANGIOTENSIN CONVERTING ENZYME  No results for input(s): AGTCE in the last 8765 hours.    C-REACTIVE PROTEIN  Recent Labs   Lab 12/15/22  1155   C-Reactive Protein 2.7*       URINALYSIS WITH MICRO & CULTURE IF INDICATED  Recent Labs   Lab 04/01/23  1735 02/23/23  1503 01/29/23  2316 01/01/23  1217   COL Straw Straw Straw Yellow   UAPP Clear Clear Clear Clear   UGLU Negative Negative Negative Negative   UBILI Negative Negative Negative Negative   UKET Negative Negative Trace* Negative   USPG 1.010 1.015 1.027 1.021   URBC Negative Small* Negative Large*   UPH 6.5 6.0 6.0 6.0   UPROT Negative Negative 30* Negative   UNITR Negative Negative Negative Negative   UWBC Negative Negative Negative Negative   SEPT  --  1 to 5 1 to 5 1 to 5       SEDIMENTATION RATE  Recent Labs   Lab 12/15/22  1155   RBC Sedimentation Rate 56*       Imaging & OTHER STUDIES    Orders Placed This Encounter   • DISCONTD: M- MG/5ML liquid   • hydroxychloroquine (PLAQUENIL) 200 MG tablet       Impression  recommendations  26-year-old patient with elevated inflammation markers, elevated globulins, negative rheumatoid factor, elevated CRP, elevated SSB and ESPERANZA.  Benign joint hypermobility.      Benign joint hypermobility, patient is being evaluated by physical therapy, she is advised to continue evaluation management as  per physical therapy recommendations.      Intermittent right hip discomfort, patient does have signs of trochanter bursitis, she has a good range of motion of the hip joints, I do not suspect any significant degenerative arthritis of the hips, I would like to continue physical therapy management before we proceed with imaging studies especially x-rays to avoid unnecessary radiation.      Positive SSB and ESPERANZA antibodies.  Continue  mg a day.    Dietary supplements discussed..    Follow-up in 6 months.

## 2023-04-27 ENCOUNTER — OFFICE VISIT (OUTPATIENT)
Dept: INTERNAL MEDICINE CLINIC | Facility: CLINIC | Age: 38
End: 2023-04-27

## 2023-04-27 VITALS
TEMPERATURE: 97.7 F | HEART RATE: 76 BPM | WEIGHT: 251 LBS | HEIGHT: 65 IN | OXYGEN SATURATION: 99 % | BODY MASS INDEX: 41.82 KG/M2 | SYSTOLIC BLOOD PRESSURE: 138 MMHG | DIASTOLIC BLOOD PRESSURE: 98 MMHG

## 2023-04-27 DIAGNOSIS — I11.0 CARDIOMYOPATHY DUE TO HYPERTENSION, WITH HEART FAILURE (HCC): Primary | ICD-10-CM

## 2023-04-27 DIAGNOSIS — I10 PRIMARY HYPERTENSION: ICD-10-CM

## 2023-04-27 DIAGNOSIS — N18.4 CRI (CHRONIC RENAL INSUFFICIENCY), STAGE 4 (SEVERE) (HCC): ICD-10-CM

## 2023-04-27 DIAGNOSIS — I43 CARDIOMYOPATHY DUE TO HYPERTENSION, WITH HEART FAILURE (HCC): Primary | ICD-10-CM

## 2023-04-27 DIAGNOSIS — R73.03 PREDIABETES: ICD-10-CM

## 2023-04-27 PROCEDURE — 3074F SYST BP LT 130 MM HG: CPT | Performed by: INTERNAL MEDICINE

## 2023-04-27 PROCEDURE — 99214 OFFICE O/P EST MOD 30 MIN: CPT | Performed by: INTERNAL MEDICINE

## 2023-04-27 PROCEDURE — 3078F DIAST BP <80 MM HG: CPT | Performed by: INTERNAL MEDICINE

## 2023-04-27 RX ORDER — CARVEDILOL 25 MG/1
25 TABLET ORAL 2 TIMES DAILY WITH MEALS
Qty: 1 TABLET | Refills: 0
Start: 2023-04-27

## 2023-04-27 SDOH — ECONOMIC STABILITY: INCOME INSECURITY: HOW HARD IS IT FOR YOU TO PAY FOR THE VERY BASICS LIKE FOOD, HOUSING, MEDICAL CARE, AND HEATING?: NOT HARD AT ALL

## 2023-04-27 SDOH — ECONOMIC STABILITY: FOOD INSECURITY: WITHIN THE PAST 12 MONTHS, YOU WORRIED THAT YOUR FOOD WOULD RUN OUT BEFORE YOU GOT MONEY TO BUY MORE.: NEVER TRUE

## 2023-04-27 SDOH — ECONOMIC STABILITY: HOUSING INSECURITY
IN THE LAST 12 MONTHS, WAS THERE A TIME WHEN YOU DID NOT HAVE A STEADY PLACE TO SLEEP OR SLEPT IN A SHELTER (INCLUDING NOW)?: NO

## 2023-04-27 SDOH — ECONOMIC STABILITY: FOOD INSECURITY: WITHIN THE PAST 12 MONTHS, THE FOOD YOU BOUGHT JUST DIDN'T LAST AND YOU DIDN'T HAVE MONEY TO GET MORE.: NEVER TRUE

## 2023-04-27 ASSESSMENT — PATIENT HEALTH QUESTIONNAIRE - PHQ9
SUM OF ALL RESPONSES TO PHQ QUESTIONS 1-9: 0
SUM OF ALL RESPONSES TO PHQ9 QUESTIONS 1 & 2: 0
SUM OF ALL RESPONSES TO PHQ QUESTIONS 1-9: 0
2. FEELING DOWN, DEPRESSED OR HOPELESS: 0
SUM OF ALL RESPONSES TO PHQ QUESTIONS 1-9: 0
SUM OF ALL RESPONSES TO PHQ QUESTIONS 1-9: 0
1. LITTLE INTEREST OR PLEASURE IN DOING THINGS: 0

## 2023-05-02 ASSESSMENT — ENCOUNTER SYMPTOMS
CONSTIPATION: 0
NAUSEA: 0
ABDOMINAL PAIN: 0
DIARRHEA: 0
WHEEZING: 0
VOMITING: 0
SINUS PAIN: 0
SHORTNESS OF BREATH: 0
CHEST TIGHTNESS: 0

## 2024-03-15 ENCOUNTER — OFFICE VISIT (OUTPATIENT)
Dept: INTERNAL MEDICINE CLINIC | Facility: CLINIC | Age: 39
End: 2024-03-15
Payer: MEDICAID

## 2024-03-15 VITALS
HEIGHT: 65 IN | WEIGHT: 247 LBS | HEART RATE: 69 BPM | TEMPERATURE: 97.4 F | OXYGEN SATURATION: 98 % | DIASTOLIC BLOOD PRESSURE: 60 MMHG | SYSTOLIC BLOOD PRESSURE: 112 MMHG | BODY MASS INDEX: 41.15 KG/M2

## 2024-03-15 DIAGNOSIS — N18.6 ESRD (END STAGE RENAL DISEASE) (HCC): ICD-10-CM

## 2024-03-15 DIAGNOSIS — E28.2 PCOS (POLYCYSTIC OVARIAN SYNDROME): Primary | ICD-10-CM

## 2024-03-15 DIAGNOSIS — I50.22 CHF (CONGESTIVE HEART FAILURE), NYHA CLASS II, CHRONIC, SYSTOLIC (HCC): ICD-10-CM

## 2024-03-15 PROBLEM — I50.21 ACUTE HFREF (HEART FAILURE WITH REDUCED EJECTION FRACTION) (HCC): Status: ACTIVE | Noted: 2023-03-18

## 2024-03-15 PROBLEM — N18.5 CHRONIC KIDNEY DISEASE, STAGE V (HCC): Status: ACTIVE | Noted: 2023-05-17

## 2024-03-15 PROBLEM — O10.213: Status: ACTIVE | Noted: 2021-06-09

## 2024-03-15 PROBLEM — D64.9 ANEMIA: Status: ACTIVE | Noted: 2020-12-24

## 2024-03-15 PROBLEM — N17.9 AKI (ACUTE KIDNEY INJURY) (HCC): Status: ACTIVE | Noted: 2023-03-17

## 2024-03-15 PROBLEM — I12.9: Status: ACTIVE | Noted: 2021-06-09

## 2024-03-15 PROBLEM — I13.10 HYPERTENSIVE HEART AND KIDNEY DISEASE: Status: ACTIVE | Noted: 2023-05-17

## 2024-03-15 PROBLEM — I77.0: Status: ACTIVE | Noted: 2023-09-26

## 2024-03-15 PROBLEM — I50.20 HFREF (HEART FAILURE WITH REDUCED EJECTION FRACTION) (HCC): Status: ACTIVE | Noted: 2023-05-17

## 2024-03-15 PROBLEM — O24.414 INSULIN CONTROLLED GESTATIONAL DIABETES MELLITUS (GDM) IN THIRD TRIMESTER: Status: ACTIVE | Noted: 2021-08-05

## 2024-03-15 PROCEDURE — 99214 OFFICE O/P EST MOD 30 MIN: CPT | Performed by: INTERNAL MEDICINE

## 2024-03-15 RX ORDER — LANSOPRAZOLE 30 MG/1
30 CAPSULE, DELAYED RELEASE ORAL DAILY
COMMUNITY

## 2024-03-15 RX ORDER — HYDRALAZINE HYDROCHLORIDE 50 MG/1
TABLET, FILM COATED ORAL
COMMUNITY
Start: 2024-02-16

## 2024-03-15 ASSESSMENT — PATIENT HEALTH QUESTIONNAIRE - PHQ9
SUM OF ALL RESPONSES TO PHQ QUESTIONS 1-9: 0
SUM OF ALL RESPONSES TO PHQ QUESTIONS 1-9: 0
SUM OF ALL RESPONSES TO PHQ9 QUESTIONS 1 & 2: 0
1. LITTLE INTEREST OR PLEASURE IN DOING THINGS: 0
SUM OF ALL RESPONSES TO PHQ QUESTIONS 1-9: 0
SUM OF ALL RESPONSES TO PHQ QUESTIONS 1-9: 0
2. FEELING DOWN, DEPRESSED OR HOPELESS: 0

## 2024-03-15 ASSESSMENT — ENCOUNTER SYMPTOMS
CONSTIPATION: 0
CHEST TIGHTNESS: 0
WHEEZING: 0
SHORTNESS OF BREATH: 0
VOMITING: 0
SINUS PAIN: 0
ABDOMINAL PAIN: 0
NAUSEA: 0
DIARRHEA: 0

## 2024-03-15 NOTE — PROGRESS NOTES
Medication: Amlodipine 10mg  passed protocol.   Last office visit date: 11/29/2023  Next appointment scheduled?: Yes 6/22/24  Number of refills given: 1    Montez Tiwari was seen today for follow-up.    Diagnoses and all orders for this visit:    PCOS (polycystic ovarian syndrome)    ESRD (end stage renal disease) ([facility])  Comments:  also keep from working    CHF (congestive heart failure), NYHA class II, chronic, systolic (HCC)  Comments:  will keep from working for now,has good chance recovering        Estimate 3 years where she cannot work.Filled out forms for her benfits.    Montez Rich is a 39 y.o. female    Chief Complaint   Patient presents with    Follow-up     Needs paperwork completed for Disability     In process of getting consideration HD and/or transplant  Of kidney. Gfr est 15-19      Breathing pretty good.  Unable o     Office Visit on 04/12/2021   Component Date Value Ref Range Status    Diagnosis: 04/12/2021 Comment   Final    NEGATIVE FOR INTRAEPITHELIAL LESION OR MALIGNANCY.    Specimen adequacy: 04/12/2021 Comment   Final    Comment: Satisfactory for evaluation. Endocervical and/or squamous metaplastic  cells (endocervical component) are present.      Clinician Provided ICD10: 04/12/2021 Comment   Final    Z12.4    Performed by: 04/12/2021 Comment   Final    Vanita Feng, Cytotechnologist (ASCP)    [id number] [id number] 04/12/2021 .   Final    Note: 04/12/2021 Comment   Final    Comment: The Pap smear is a screening test designed to aid in the detection of  premalignant and malignant conditions of the uterine cervix.  It is not a  diagnostic procedure and should not be used as the sole means of detecting  cervical cancer.  Both false-positive and false-negative reports do occur.      Methodology 04/12/2021 Comment   Final    Comment: This liquid based ThinPrep(R) pap test was screened with the  use of an image guided system.      HPV Aptima 04/12/2021 Negative  Negative Final    Comment: This nucleic acid amplification test detects fourteen high-risk  HPV types (16,18,31,33,35,39,45,51,52,56,58,59,66,68) without  differentiation.

## 2024-06-14 ENCOUNTER — OFFICE VISIT (OUTPATIENT)
Dept: INTERNAL MEDICINE CLINIC | Facility: CLINIC | Age: 39
End: 2024-06-14
Payer: MEDICAID

## 2024-06-14 VITALS
HEIGHT: 65 IN | BODY MASS INDEX: 40.32 KG/M2 | TEMPERATURE: 98 F | OXYGEN SATURATION: 99 % | WEIGHT: 242 LBS | HEART RATE: 77 BPM | SYSTOLIC BLOOD PRESSURE: 114 MMHG | DIASTOLIC BLOOD PRESSURE: 82 MMHG

## 2024-06-14 DIAGNOSIS — N18.5 CHRONIC KIDNEY DISEASE, STAGE V (HCC): ICD-10-CM

## 2024-06-14 DIAGNOSIS — D64.9 ANEMIA, UNSPECIFIED TYPE: Primary | ICD-10-CM

## 2024-06-14 LAB
BASOPHILS # BLD: 0 K/UL (ref 0–0.2)
BASOPHILS NFR BLD: 1 % (ref 0–2)
DIFFERENTIAL METHOD BLD: ABNORMAL
EOSINOPHIL # BLD: 0 K/UL (ref 0–0.8)
EOSINOPHIL NFR BLD: 1 % (ref 0.5–7.8)
ERYTHROCYTE [DISTWIDTH] IN BLOOD BY AUTOMATED COUNT: 16 % (ref 11.9–14.6)
HCT VFR BLD AUTO: 32.8 % (ref 35.8–46.3)
HGB BLD-MCNC: 9.5 G/DL (ref 11.7–15.4)
IMM GRANULOCYTES # BLD AUTO: 0 K/UL (ref 0–0.5)
IMM GRANULOCYTES NFR BLD AUTO: 0 % (ref 0–5)
LYMPHOCYTES # BLD: 1.5 K/UL (ref 0.5–4.6)
LYMPHOCYTES NFR BLD: 20 % (ref 13–44)
MCH RBC QN AUTO: 24.3 PG (ref 26.1–32.9)
MCHC RBC AUTO-ENTMCNC: 29 G/DL (ref 31.4–35)
MCV RBC AUTO: 83.9 FL (ref 82–102)
MONOCYTES # BLD: 0.4 K/UL (ref 0.1–1.3)
MONOCYTES NFR BLD: 6 % (ref 4–12)
NEUTS SEG # BLD: 5.3 K/UL (ref 1.7–8.2)
NEUTS SEG NFR BLD: 72 % (ref 43–78)
NRBC # BLD: 0 K/UL (ref 0–0.2)
PLATELET # BLD AUTO: 286 K/UL (ref 150–450)
PMV BLD AUTO: 9.4 FL (ref 9.4–12.3)
RBC # BLD AUTO: 3.91 M/UL (ref 4.05–5.2)
WBC # BLD AUTO: 7.4 K/UL (ref 4.3–11.1)

## 2024-06-14 PROCEDURE — 99213 OFFICE O/P EST LOW 20 MIN: CPT | Performed by: INTERNAL MEDICINE

## 2024-06-14 SDOH — ECONOMIC STABILITY: FOOD INSECURITY: WITHIN THE PAST 12 MONTHS, THE FOOD YOU BOUGHT JUST DIDN'T LAST AND YOU DIDN'T HAVE MONEY TO GET MORE.: NEVER TRUE

## 2024-06-14 SDOH — ECONOMIC STABILITY: INCOME INSECURITY: HOW HARD IS IT FOR YOU TO PAY FOR THE VERY BASICS LIKE FOOD, HOUSING, MEDICAL CARE, AND HEATING?: PATIENT DECLINED

## 2024-06-14 SDOH — ECONOMIC STABILITY: FOOD INSECURITY: WITHIN THE PAST 12 MONTHS, YOU WORRIED THAT YOUR FOOD WOULD RUN OUT BEFORE YOU GOT MONEY TO BUY MORE.: NEVER TRUE

## 2024-06-14 SDOH — ECONOMIC STABILITY: TRANSPORTATION INSECURITY
IN THE PAST 12 MONTHS, HAS LACK OF TRANSPORTATION KEPT YOU FROM MEETINGS, WORK, OR FROM GETTING THINGS NEEDED FOR DAILY LIVING?: NO

## 2024-06-14 NOTE — PROGRESS NOTES
Transportation Needs: Unknown (6/14/2024)    PRAPARE - Transportation     Lack of Transportation (Medical): Not on file     Lack of Transportation (Non-Medical): No   Physical Activity: Not on file   Stress: Not on file   Social Connections: Not on file   Intimate Partner Violence: Not on file   Housing Stability: Unknown (6/14/2024)    Housing Stability Vital Sign     Unable to Pay for Housing in the Last Year: Not on file     Number of Places Lived in the Last Year: Not on file     Unstable Housing in the Last Year: No         Current Outpatient Medications:     hydrALAZINE (APRESOLINE) 50 MG tablet, TAKE 1 & 1/2 (ONE & ONE-HALF) TABLETS BY MOUTH THREE TIMES DAILY, Disp: , Rfl:     lansoprazole (PREVACID) 30 MG delayed release capsule, Take 1 capsule by mouth daily, Disp: , Rfl:     carvedilol (COREG) 25 MG tablet, Take 1 tablet by mouth 2 times daily (with meals), Disp: 1 tablet, Rfl: 0    atorvastatin (LIPITOR) 40 MG tablet, Take 1 tablet by mouth daily, Disp: 30 tablet, Rfl: 5    isosorbide dinitrate (ISORDIL) 10 MG tablet, Take 1 tablet by mouth in the morning and at bedtime, Disp: 90 tablet, Rfl: 1    torsemide (DEMADEX) 20 MG tablet, Take 1 tablet by mouth in the morning and at bedtime, Disp: 60 tablet, Rfl: 5    amLODIPine (NORVASC) 10 MG tablet, Take 1 tablet by mouth daily, Disp: , Rfl:     No Known Allergies      Review of Systems   Constitutional:  Positive for fatigue. Negative for appetite change, chills and fever.   HENT:  Negative for sinus pain.    Respiratory:  Negative for shortness of breath and wheezing.    Cardiovascular:  Negative for chest pain.   Gastrointestinal:  Negative for abdominal pain, constipation, diarrhea, nausea and vomiting.   Genitourinary:  Negative for dysuria and frequency.   Musculoskeletal:  Positive for arthralgias. Negative for myalgias.   Neurological:  Negative for dizziness.   Psychiatric/Behavioral:  Negative for suicidal ideas.    All other systems reviewed and

## 2024-06-16 LAB — HGB S BLD QL SOLY: NEGATIVE

## 2024-06-17 LAB
HGB A MFR BLD: 97.2 % (ref 96.4–98.8)
HGB A2 MFR BLD COLUMN CHROM: 2.3 % (ref 1.8–3.2)
HGB F MFR BLD: 0.5 % (ref 0–2)
HGB FRACT BLD-IMP: NORMAL
HGB S MFR BLD: 0 %

## 2024-06-18 LAB — PATH REV BLD -IMP: NORMAL

## 2024-12-12 ENCOUNTER — OFFICE VISIT (OUTPATIENT)
Dept: INTERNAL MEDICINE CLINIC | Facility: CLINIC | Age: 39
End: 2024-12-12
Payer: MEDICAID

## 2024-12-12 VITALS
DIASTOLIC BLOOD PRESSURE: 70 MMHG | SYSTOLIC BLOOD PRESSURE: 130 MMHG | BODY MASS INDEX: 40.27 KG/M2 | HEIGHT: 65 IN | OXYGEN SATURATION: 98 % | HEART RATE: 81 BPM | TEMPERATURE: 98.9 F

## 2024-12-12 DIAGNOSIS — M54.41 CHRONIC RIGHT-SIDED LOW BACK PAIN WITH RIGHT-SIDED SCIATICA: Primary | ICD-10-CM

## 2024-12-12 DIAGNOSIS — M54.41 ACUTE RIGHT-SIDED LOW BACK PAIN WITH RIGHT-SIDED SCIATICA: ICD-10-CM

## 2024-12-12 DIAGNOSIS — R29.898 RIGHT LEG WEAKNESS: ICD-10-CM

## 2024-12-12 DIAGNOSIS — G89.29 CHRONIC RIGHT-SIDED LOW BACK PAIN WITH RIGHT-SIDED SCIATICA: Primary | ICD-10-CM

## 2024-12-12 PROCEDURE — 99214 OFFICE O/P EST MOD 30 MIN: CPT | Performed by: INTERNAL MEDICINE

## 2024-12-12 RX ORDER — OXYCODONE HYDROCHLORIDE 5 MG/1
5 TABLET ORAL DAILY
COMMUNITY
Start: 2024-12-10

## 2024-12-12 RX ORDER — TRAMADOL HYDROCHLORIDE 50 MG/1
50 TABLET ORAL EVERY 6 HOURS PRN
Qty: 28 TABLET | Refills: 0 | Status: SHIPPED | OUTPATIENT
Start: 2024-12-12 | End: 2024-12-19

## 2024-12-12 RX ORDER — ERGOCALCIFEROL 1.25 MG/1
50000 CAPSULE, LIQUID FILLED ORAL WEEKLY
COMMUNITY

## 2024-12-12 NOTE — PROGRESS NOTES
Montez Tiwari was seen today for follow-up.    Diagnoses and all orders for this visit:    Chronic right-sided low back pain with right-sided sciatica  -     External Referral to Physical Therapy  -     MRI LUMBAR SPINE WO CONTRAST; Future  -     traMADol (ULTRAM) 50 MG tablet; Take 1 tablet by mouth every 6 hours as needed for Pain for up to 7 days. Intended supply: 7 days. Take lowest dose possible to manage pain Max Daily Amount: 200 mg    Right leg weakness  -     MRI LUMBAR SPINE WO CONTRAST; Future    Acute right-sided low back pain with right-sided sciatica  -     traMADol (ULTRAM) 50 MG tablet; Take 1 tablet by mouth every 6 hours as needed for Pain for up to 7 days. Intended supply: 7 days. Take lowest dose possible to manage pain Max Daily Amount: 200 mg          Montez Rich is a 39 y.o. female    Chief Complaint   Patient presents with    Follow-up     F/U from ER back and right buttock. Discuss having MRI.   Was severe could not walk' Tues not able    Treatment pain meds from er.    Xr -was ok   MRI recommended  Office Visit on 06/14/2024   Component Date Value Ref Range Status    Pathologist Review 06/14/2024 (NOTE)   Final    Comment: RBCS:HYPOCHROMIC NORMOCYTIC ANEMIA.ANISOCYTOSIS.OVALAND ELLIPTICAL   FROMS  WBCS:MORPHOLOGICALLY UNREMARKABLE.  PLTS:MORPHOLOGICALLY UNREMARKABLE.  PER YAMILA HERRING MD ON 06.18.2024 BY KANDACE        WBC 06/14/2024 7.4  4.3 - 11.1 K/uL Final    RBC 06/14/2024 3.91 (L)  4.05 - 5.2 M/uL Final    Hemoglobin 06/14/2024 9.5 (L)  11.7 - 15.4 g/dL Final    Hematocrit 06/14/2024 32.8 (L)  35.8 - 46.3 % Final    MCV 06/14/2024 83.9  82 - 102 FL Final    MCH 06/14/2024 24.3 (L)  26.1 - 32.9 PG Final    MCHC 06/14/2024 29.0 (L)  31.4 - 35.0 g/dL Final    RDW 06/14/2024 16.0 (H)  11.9 - 14.6 % Final    Platelets 06/14/2024 286  150 - 450 K/uL Final    MPV 06/14/2024 9.4  9.4 - 12.3 FL Final    nRBC 06/14/2024 0.00  0.0 - 0.2 K/uL Final    **Note: Absolute NRBC

## 2025-01-16 ENCOUNTER — TELEPHONE (OUTPATIENT)
Dept: INTERNAL MEDICINE CLINIC | Facility: CLINIC | Age: 40
End: 2025-01-16

## 2025-01-16 DIAGNOSIS — F40.240 CLAUSTROPHOBIA: Primary | ICD-10-CM

## 2025-01-16 RX ORDER — LORAZEPAM 0.5 MG/1
TABLET ORAL
Qty: 2 TABLET | Refills: 0 | Status: SHIPPED | OUTPATIENT
Start: 2025-01-16 | End: 2025-04-16

## 2025-01-16 NOTE — TELEPHONE ENCOUNTER
She went in today for MRI unable to start due to claustrophobic. Please send Rx for MRI [facility]. Thanks

## 2025-02-03 ENCOUNTER — TELEPHONE (OUTPATIENT)
Dept: INTERNAL MEDICINE CLINIC | Facility: CLINIC | Age: 40
End: 2025-02-03

## 2025-02-03 NOTE — TELEPHONE ENCOUNTER
Arti with [facility] called and states that she received two orders for two MRI's for the patient and she would like to know which one she needs to keep. Please Advise.

## 2025-02-03 NOTE — TELEPHONE ENCOUNTER
Called and left message on [employer] voice mail only MRI order I see is MRI Lumbar spine without contrast seen and ordered 12/12/24.

## 2025-03-12 DIAGNOSIS — N18.6 ESRD (END STAGE RENAL DISEASE) (HCC): ICD-10-CM

## 2025-03-12 DIAGNOSIS — D64.9 ANEMIA, UNSPECIFIED TYPE: Primary | ICD-10-CM

## 2025-03-12 DIAGNOSIS — N18.5 CHRONIC KIDNEY DISEASE, STAGE V (HCC): ICD-10-CM

## 2025-03-12 DIAGNOSIS — R73.03 PREDIABETES: ICD-10-CM

## 2025-03-12 DIAGNOSIS — I50.22 CHF (CONGESTIVE HEART FAILURE), NYHA CLASS II, CHRONIC, SYSTOLIC (HCC): ICD-10-CM

## 2025-03-20 ASSESSMENT — PATIENT HEALTH QUESTIONNAIRE - PHQ9
SUM OF ALL RESPONSES TO PHQ9 QUESTIONS 1 & 2: 2
SUM OF ALL RESPONSES TO PHQ QUESTIONS 1-9: 2
1. LITTLE INTEREST OR PLEASURE IN DOING THINGS: SEVERAL DAYS
2. FEELING DOWN, DEPRESSED OR HOPELESS: SEVERAL DAYS
1. LITTLE INTEREST OR PLEASURE IN DOING THINGS: SEVERAL DAYS
SUM OF ALL RESPONSES TO PHQ QUESTIONS 1-9: 2
2. FEELING DOWN, DEPRESSED OR HOPELESS: SEVERAL DAYS
SUM OF ALL RESPONSES TO PHQ QUESTIONS 1-9: 2
SUM OF ALL RESPONSES TO PHQ QUESTIONS 1-9: 2

## 2025-03-21 SDOH — ECONOMIC STABILITY: INCOME INSECURITY: IN THE LAST 12 MONTHS, WAS THERE A TIME WHEN YOU WERE NOT ABLE TO PAY THE MORTGAGE OR RENT ON TIME?: NO

## 2025-03-21 SDOH — ECONOMIC STABILITY: FOOD INSECURITY: WITHIN THE PAST 12 MONTHS, YOU WORRIED THAT YOUR FOOD WOULD RUN OUT BEFORE YOU GOT MONEY TO BUY MORE.: NEVER TRUE

## 2025-03-21 SDOH — ECONOMIC STABILITY: FOOD INSECURITY: WITHIN THE PAST 12 MONTHS, THE FOOD YOU BOUGHT JUST DIDN'T LAST AND YOU DIDN'T HAVE MONEY TO GET MORE.: NEVER TRUE

## 2025-03-21 SDOH — ECONOMIC STABILITY: TRANSPORTATION INSECURITY
IN THE PAST 12 MONTHS, HAS THE LACK OF TRANSPORTATION KEPT YOU FROM MEDICAL APPOINTMENTS OR FROM GETTING MEDICATIONS?: NO

## 2025-03-24 ENCOUNTER — OFFICE VISIT (OUTPATIENT)
Dept: INTERNAL MEDICINE CLINIC | Facility: CLINIC | Age: 40
End: 2025-03-24
Payer: MEDICAID

## 2025-03-24 VITALS
HEART RATE: 84 BPM | TEMPERATURE: 97.9 F | SYSTOLIC BLOOD PRESSURE: 120 MMHG | WEIGHT: 225 LBS | OXYGEN SATURATION: 99 % | DIASTOLIC BLOOD PRESSURE: 78 MMHG | BODY MASS INDEX: 37.49 KG/M2 | HEIGHT: 65 IN

## 2025-03-24 DIAGNOSIS — N18.5 CHRONIC KIDNEY DISEASE, STAGE V (HCC): Primary | ICD-10-CM

## 2025-03-24 DIAGNOSIS — D64.9 ANEMIA, UNSPECIFIED TYPE: ICD-10-CM

## 2025-03-24 DIAGNOSIS — R73.03 PREDIABETES: ICD-10-CM

## 2025-03-24 PROCEDURE — 99214 OFFICE O/P EST MOD 30 MIN: CPT | Performed by: INTERNAL MEDICINE

## 2025-03-24 NOTE — PROGRESS NOTES
Metabolic Panel; Future  -     TSH reflex to FT4; Future    History of Present Illness  The patient is a 40-year-old female who presents for a yearly checkup and hypertension.    A regimen of amlodipine, atorvastatin, carvedilol, Isordil, Prevacid, and torsemide is currently being followed. No need for medication refills is reported at this time.    Urinary output continues, and hemodialysis is performed 3 days per week, which is reported as being effective. A referral for a transplant has been initiated, and previous consultation with the transplant team has occurred. Weight loss was advised and successfully accomplished, leading to the resubmission of the referral. Fatigue post-dialysis is reported.    A few months prior, an influenza infection was experienced, from which recovery has since occurred. During this period, hospitalization occurred on 3 separate occasions. Initially, medical attention was sought due to general malaise but tested negative for influenza. However, the condition deteriorated the following day, necessitating emergency medical services due to the onset of numbness in the face, legs, and arms, accompanied by immobility. Respiratory distress was not experienced at that time. Upon hospital admission, hypokalemia and hypocalcemia were diagnosed and treated accordingly. Discharge occurred the subsequent morning but readmission was required later that day due to involuntary head shaking. Further testing revealed an underlying infection, and a diagnosis of influenza A was made.    No chest pain, fevers, or chills are reported. Feelings of depression are also not reported.    Social History     Tobacco Use    Smoking status: Never    Smokeless tobacco: Never   Vaping Use    Vaping status: Never Used   Substance Use Topics    Alcohol use: Not Currently    Drug use: Never     Vitals:    03/24/25 1425   BP: 120/78   Pulse: 84   Temp: 97.9 °F (36.6 °C)   TempSrc: Temporal   SpO2: 99%   Weight: 102.1 kg